# Patient Record
Sex: FEMALE | Race: WHITE | NOT HISPANIC OR LATINO | Employment: PART TIME | URBAN - METROPOLITAN AREA
[De-identification: names, ages, dates, MRNs, and addresses within clinical notes are randomized per-mention and may not be internally consistent; named-entity substitution may affect disease eponyms.]

---

## 2017-01-25 ENCOUNTER — OFFICE VISIT (OUTPATIENT)
Dept: OBSTETRICS AND GYNECOLOGY | Facility: CLINIC | Age: 52
End: 2017-01-25

## 2017-01-25 VITALS
BODY MASS INDEX: 24.29 KG/M2 | WEIGHT: 164 LBS | HEIGHT: 69 IN | DIASTOLIC BLOOD PRESSURE: 70 MMHG | SYSTOLIC BLOOD PRESSURE: 120 MMHG | RESPIRATION RATE: 14 BRPM

## 2017-01-25 DIAGNOSIS — Z01.419 WELL FEMALE EXAM WITH ROUTINE GYNECOLOGICAL EXAM: Primary | ICD-10-CM

## 2017-01-25 PROCEDURE — 99396 PREV VISIT EST AGE 40-64: CPT | Performed by: OBSTETRICS & GYNECOLOGY

## 2017-01-25 NOTE — PROGRESS NOTES
Chief Complaint: Annual exam    Carlota Brian is a 51 y.o.  postmenopausal presenting for annual exam.  She had an episode of postmenopausal bleeding 2 years ago-workup was negative has not had anything since.  No gynecologic complaints, no urinary or bowel symptoms.  Mammogram is been normal another one scheduled for next month.  Daughter Sara huynh at [facility], son can is 21-form of autism-doing well.      Pertinent items are noted in HPI.     There were no vitals taken for this visit.         Physical Exam    General well developed; well nourished  no acute distress   Skin No suspicious lesions seen   Thyroid normal to inspection and palpation   Lungs breathing is unlabored  clear to auscultation bilaterally   Cardiac regular rate and rhythm, S1, S2 normal, no murmur, click, rub or gallop   Breasts Examined in supine position  Symmetric without masses or skin dimpling  Nipples normal without inversion, lesions or discharge  There are no palpable axillary nodes   Abdomen soft, non-tender; no masses  no umbilical or inginual hernias are present  no hepato-splenomegaly   [facility]Npelvic Clinical staff was present for exam  External genitalia:  normal appearance of the external genitalia including Bartholin's and [place]'s glands.  :  urethral meatus normal; urethral hypermobility is absent.  Vaginal:  normal pink mucosa without prolapse or lesions.  Cervix:  normal appearance.  Pap smear obtained  Uterus:  normal size, shape and consistency.  Adnexa:  normal bimanual exam of the adnexa.  Rectal:  digital rectal exam not performed; anus visually normal appearing.       Assessment:   1. Normal postmenopausal exam-no particular symptoms  2. Fibrocystic breast stable mammograms    Plan:  1. Annual exams

## 2017-01-25 NOTE — MR AVS SNAPSHOT
"                        Carlota Brian   2017 2:00 PM   Office Visit    Dept Phone:  [phone]   Encounter #:  [id number]    Provider:  Lyle Larsen MD   Department:  [facility] [place]                Your Full Care Plan              Your Updated Medication List      Notice  As of 2017  2:49 PM    You have not been prescribed any medications.            You Were Diagnosed With        Codes Comments    Well female exam with routine gynecological exam    -  Primary ICD-10-CM: Z01.419  ICD-9-CM: V72.31       Instructions     None    Patient Instructions History      Upcoming Appointments     Visit Type Date Time Department    ANNUAL 2017  2:00 PM [facility]      MyChart Signup     [facility] Dtime allows you to send messages to your doctor, view your test results, renew your prescriptions, schedule appointments, and more. To sign up, go to [employer] and click on the Sign Up Now link in the New User? box. Enter your Dtime Activation Code exactly as it appears below along with the last four digits of your Social Security Number and your Date of Birth () to complete the sign-up process. If you do not sign up before the expiration date, you must request a new code.    Dtime Activation Code: [email or web address]  Expires: 2017  2:49 PM    If you have questions, you can email [email or web address] or call [phone] to talk to our Dtime staff. Remember, Dtime is NOT to be used for urgent needs. For medical emergencies, dial 911.               Other Info from Your Visit           Allergies     No Known Allergies      Reason for Visit     Gynecologic Exam annual      Vital Signs     Blood Pressure Respirations Height Weight Body Mass Index Smoking Status    120/70 14 69\" (175.3 cm) 164 lb (74.4 kg) 24.22 kg/m2 Never Smoker      Problems and Diagnoses Noted     Well female exam with routine gynecological exam    -  Primary "

## 2018-01-30 ENCOUNTER — OFFICE VISIT (OUTPATIENT)
Dept: OBSTETRICS AND GYNECOLOGY | Facility: CLINIC | Age: 53
End: 2018-01-30

## 2018-01-30 VITALS
WEIGHT: 172.6 LBS | BODY MASS INDEX: 25.56 KG/M2 | SYSTOLIC BLOOD PRESSURE: 126 MMHG | HEIGHT: 69 IN | DIASTOLIC BLOOD PRESSURE: 82 MMHG

## 2018-01-30 DIAGNOSIS — N60.11 FIBROCYSTIC BREAST CHANGES, BILATERAL: Primary | ICD-10-CM

## 2018-01-30 DIAGNOSIS — N60.12 FIBROCYSTIC BREAST CHANGES, BILATERAL: Primary | ICD-10-CM

## 2018-01-30 DIAGNOSIS — Z78.0 MENOPAUSE: ICD-10-CM

## 2018-01-30 DIAGNOSIS — Z01.419 ENCOUNTER FOR GYNECOLOGICAL EXAMINATION WITHOUT ABNORMAL FINDING: ICD-10-CM

## 2018-01-30 PROCEDURE — 99396 PREV VISIT EST AGE 40-64: CPT | Performed by: OBSTETRICS & GYNECOLOGY

## 2018-01-30 RX ORDER — LEVOTHYROXINE SODIUM 88 UG/1
1 TABLET ORAL DAILY
Refills: 3 | COMMUNITY
Start: 2017-12-04 | End: 2020-10-12 | Stop reason: SDUPTHER

## 2018-01-30 RX ORDER — CHLORAL HYDRATE 500 MG
1000 CAPSULE ORAL
COMMUNITY
End: 2020-02-13

## 2018-01-30 RX ORDER — SODIUM PHOSPHATE,MONO-DIBASIC 19G-7G/118
1 ENEMA (ML) RECTAL EVERY OTHER DAY
COMMUNITY
End: 2021-03-18

## 2018-01-30 NOTE — PROGRESS NOTES
Chief Complaint   Patient presents with   • Gynecologic Exam       Carlota Brian is a 52 y.o. year old  presenting to be seen for her annual exam.This patient has been followed by Dr. Lyle Larsen.  She is menopausal and does not use estrogen/progestin therapy.  She has some vaginal dryness.  She denies other menopausal symptoms.  She denies bowel or urinary symptoms.    SCREENING TESTS    Year 2012   Age                         PAP      ASCUS                   HPV high risk      Neg.                   Mammogram      benign                   SHELTON score                         Breast MRI                         Lipids                         Vitamin D                         Colonoscopy                         DEXA  Frax (hip/any)                         Ovarian Screen    normal                         She exercises regularly: yes.  She wears her seat belt: yes.  She has concerns about domestic violence: no.  She has noticed changes in height: no    GYN screening history:  · Last pap: was done on approximately 2017 and the result was: ASCUS with NEGATIVE high risk HPV.  · Last mammogram: was done on approximately 3/30/2017 and the result was: Birads II (Benign findings)..    No Additional Complaints Reported    The following portions of the patient's history were reviewed and updated as appropriate:vital signs and   She  does not have any pertinent problems on file.  She  has a past surgical history that includes Ear Tubes Removal (Bilateral).  Her family history is not on file.  She  reports that she has never smoked. She has never used smokeless tobacco. She reports that she drinks alcohol. She reports that she does not use illicit drugs.  Current Outpatient Prescriptions   Medication Sig Dispense Refill   • BIOTIN FORTE PO Take 1 tablet by mouth Daily.     • Calcium-Magnesium-Vitamin D (CALCIUM  "MAGNESIUM PO) Take 1 tablet by mouth Daily.     • glucosamine-chondroitin 500-400 MG capsule capsule Take 1 capsule by mouth 2 (Two) Times a Day With Meals.     • Multiple Vitamins-Minerals (MULTIVITAMIN ADULT PO) Take 1 tablet by mouth Daily.     • Omega-3 Fatty Acids (FISH OIL) 1000 MG capsule capsule Take 1,000 mg by mouth Daily With Breakfast.     • levocetirizine (XYZAL) 5 MG tablet Take 5 mg by mouth Every Evening.     • levothyroxine (SYNTHROID, LEVOTHROID) 88 MCG tablet Take 1 tablet by mouth Daily.  3     No current facility-administered medications for this visit.      She is allergic to azithromycin..    Review of Systems  A comprehensive review of systems was taken.  Constitutional: negative for fever, chills, activity change, appetite change, fatigue and unexpected weight change.  Respiratory: negative  Cardiovascular: negative  Gastrointestinal: negative  Genitourinary:negative  Musculoskeletal:negative  Behavioral/Psych: negative       /82  Ht 175.3 cm (69\")  Wt 78.3 kg (172 lb 9.6 oz)  BMI 25.49 kg/m2    Physical Exam    General:  alert; cooperative; well developed; well nourished   Skin:  No suspicious lesions seen   Thyroid: normal to inspection and palpation   Lungs:  clear to auscultation bilaterally   Heart:  regular rate and rhythm, S1, S2 normal, no murmur, click, rub or gallop   Breasts:  Examined in supine position  Symmetric without masses or skin dimpling  Nipples normal without inversion, lesions or discharge  There are no palpable axillary nodes  Fibrocystic changes are present both breasts without a discrete mass   Abdomen: soft, non-tender; no masses  no umbilical or inginual hernias are present  no hepato-splenomegaly   Pelvis: Clinical staff was present for exam  External genitalia:  normal appearance of the external genitalia including Bartholin's and [place]'s glands.  Vaginal:  normal pink mucosa without prolapse or lesions.  Cervix:  normal appearance.  Uterus:  normal " size, shape and consistency. anteverted;  Adnexa:  non palpable bilaterally.  Rectal:  anus visually normal appearing. recto-vaginal exam unremarkable and confirms findings;     Lab Review   No data reviewed    Imaging  Mammogram results- benign         ASSESSMENT  Problems Addressed this Visit        Genitourinary    Menopause    Relevant Orders    DEXA Bone Density Axial       Other    Fibrocystic breast changes, bilateral - Primary      Other Visit Diagnoses     Encounter for gynecological examination without abnormal finding        Relevant Orders    Liquid-based Pap Smear, Screening - ThinPrep Vial, Cervix, Endocervix          PLAN    Medications prescribed this encounter:    New Medications Ordered This Visit   Medications   • levothyroxine (SYNTHROID, LEVOTHROID) 88 MCG tablet     Sig: Take 1 tablet by mouth Daily.     Refill:  3   • glucosamine-chondroitin 500-400 MG capsule capsule     Sig: Take 1 capsule by mouth 2 (Two) Times a Day With Meals.   • Omega-3 Fatty Acids (FISH OIL) 1000 MG capsule capsule     Sig: Take 1,000 mg by mouth Daily With Breakfast.   • Multiple Vitamins-Minerals (MULTIVITAMIN ADULT PO)     Sig: Take 1 tablet by mouth Daily.   • BIOTIN FORTE PO     Sig: Take 1 tablet by mouth Daily.   • Calcium-Magnesium-Vitamin D (CALCIUM MAGNESIUM PO)     Sig: Take 1 tablet by mouth Daily.   · Pap test done  · DEXA ordered  · Calcium, 600 mg/ Vit. D, 400 IU daily; regular weight-bearing exercise  · Follow up: 12 month(s)  *Please note that portions of this documentation may have been completed with a voice recognition program.  Efforts were made to edit this dictation, but occasional words may have been mistranscribed.       This note was electronically signed.    PARVIN Antoine MD  January 30, 2018  1:34 PM

## 2018-02-08 ENCOUNTER — HOSPITAL ENCOUNTER (OUTPATIENT)
Dept: BONE DENSITY | Facility: HOSPITAL | Age: 53
Discharge: HOME | End: 2018-02-08
Attending: OBSTETRICS & GYNECOLOGY | Admitting: OBSTETRICS & GYNECOLOGY

## 2018-02-08 DIAGNOSIS — Z78.0 MENOPAUSE: ICD-10-CM

## 2018-02-08 PROCEDURE — 77080 DXA BONE DENSITY AXIAL: CPT

## 2018-02-12 ENCOUNTER — TELEPHONE (OUTPATIENT)
Dept: OBSTETRICS AND GYNECOLOGY | Facility: CLINIC | Age: 53
End: 2018-02-12

## 2018-02-12 NOTE — TELEPHONE ENCOUNTER
"Pt notified of DEXA result and Dr Antoine's suggestion that she take Alendronate 70 mg weekly . She says she \"wants to think about this, is not sure she wants to take bone building medication\". We discussed checking labs and she will go to [facility] lab tomorrow for Serum Calcium and Vit D. Says she gets her TSH checked q 6 months and is on Levothyroxine 88 mcg. She sees Dr Lion, Endocrinologist. Her lab results in EPIC indicatre she has not has thyroid levels checked since 1/2016. I called pt back to let her know - she will call Endocrinologist's office tomorrow AM and have them send lab request and will make an appt to follow up with them. We discussed Calcium and Vit D intake and importance of exercise. We will talk again after lab results.      "

## 2018-02-13 ENCOUNTER — LAB (OUTPATIENT)
Dept: LAB | Facility: HOSPITAL | Age: 53
End: 2018-02-13

## 2018-02-13 ENCOUNTER — TRANSCRIBE ORDERS (OUTPATIENT)
Dept: LAB | Facility: HOSPITAL | Age: 53
End: 2018-02-13

## 2018-02-13 DIAGNOSIS — I51.9 MYXEDEMA HEART DISEASE: Primary | ICD-10-CM

## 2018-02-13 DIAGNOSIS — M85.89 OSTEOPENIA OF MULTIPLE SITES: Primary | ICD-10-CM

## 2018-02-13 DIAGNOSIS — E03.9 MYXEDEMA HEART DISEASE: Primary | ICD-10-CM

## 2018-02-13 DIAGNOSIS — I51.9 MYXEDEMA HEART DISEASE: ICD-10-CM

## 2018-02-13 DIAGNOSIS — M85.89 OSTEOPENIA OF MULTIPLE SITES: ICD-10-CM

## 2018-02-13 DIAGNOSIS — E03.9 MYXEDEMA HEART DISEASE: ICD-10-CM

## 2018-02-13 LAB
25(OH)D3 SERPL-MCNC: 25 NG/ML
ALBUMIN SERPL-MCNC: 4.5 G/DL (ref 3.2–4.8)
ALBUMIN/GLOB SERPL: 2 G/DL (ref 1.5–2.5)
ALP SERPL-CCNC: 92 U/L (ref 25–100)
ALT SERPL W P-5'-P-CCNC: 38 U/L (ref 7–40)
ANION GAP SERPL CALCULATED.3IONS-SCNC: 6 MMOL/L (ref 3–11)
AST SERPL-CCNC: 22 U/L (ref 0–33)
BILIRUB SERPL-MCNC: 0.4 MG/DL (ref 0.3–1.2)
BUN BLD-MCNC: 19 MG/DL (ref 9–23)
BUN/CREAT SERPL: 23.8 (ref 7–25)
CALCIUM SPEC-SCNC: 9.2 MG/DL (ref 8.7–10.4)
CHLORIDE SERPL-SCNC: 102 MMOL/L (ref 99–109)
CO2 SERPL-SCNC: 30 MMOL/L (ref 20–31)
CREAT BLD-MCNC: 0.8 MG/DL (ref 0.6–1.3)
GFR SERPL CREATININE-BSD FRML MDRD: 75 ML/MIN/1.73
GLOBULIN UR ELPH-MCNC: 2.3 GM/DL
GLUCOSE BLD-MCNC: 85 MG/DL (ref 70–100)
POTASSIUM BLD-SCNC: 4.2 MMOL/L (ref 3.5–5.5)
PROT SERPL-MCNC: 6.8 G/DL (ref 5.7–8.2)
SODIUM BLD-SCNC: 138 MMOL/L (ref 132–146)
T3RU NFR SERPL: 31.5 % (ref 23–37)
T4 FREE SERPL-MCNC: 1.43 NG/DL (ref 0.89–1.76)
T4 SERPL-MCNC: 10 MCG/DL (ref 4.7–11.4)
TSH SERPL DL<=0.05 MIU/L-ACNC: 0.67 MIU/ML (ref 0.35–5.35)

## 2018-02-13 PROCEDURE — 36415 COLL VENOUS BLD VENIPUNCTURE: CPT

## 2018-02-13 PROCEDURE — 82306 VITAMIN D 25 HYDROXY: CPT

## 2018-02-13 PROCEDURE — 80053 COMPREHEN METABOLIC PANEL: CPT

## 2018-02-13 PROCEDURE — 84479 ASSAY OF THYROID (T3 OR T4): CPT

## 2018-02-13 PROCEDURE — 84439 ASSAY OF FREE THYROXINE: CPT

## 2018-02-13 PROCEDURE — 84436 ASSAY OF TOTAL THYROXINE: CPT

## 2018-02-13 PROCEDURE — 84443 ASSAY THYROID STIM HORMONE: CPT

## 2018-02-14 ENCOUNTER — TELEPHONE (OUTPATIENT)
Dept: OBSTETRICS AND GYNECOLOGY | Facility: CLINIC | Age: 53
End: 2018-02-14

## 2018-02-14 NOTE — TELEPHONE ENCOUNTER
"Pt returning call for lab results. Instructions given to begin taking Vit D3 2000 IU daily as her level is low. Pt says she has begun walking at least 35 - 40 minutes at least 4 x a week and \"including lots of hills\". She states she has decided she does not want to take alendronate at this time.      "

## 2018-05-10 ENCOUNTER — OFFICE VISIT (OUTPATIENT)
Dept: INTERNAL MEDICINE | Facility: CLINIC | Age: 53
End: 2018-05-10

## 2018-05-10 VITALS
OXYGEN SATURATION: 98 % | HEIGHT: 69 IN | DIASTOLIC BLOOD PRESSURE: 78 MMHG | BODY MASS INDEX: 24.68 KG/M2 | WEIGHT: 166.6 LBS | HEART RATE: 86 BPM | TEMPERATURE: 98.2 F | SYSTOLIC BLOOD PRESSURE: 126 MMHG

## 2018-05-10 DIAGNOSIS — E55.9 VITAMIN D DEFICIENCY: ICD-10-CM

## 2018-05-10 DIAGNOSIS — Z82.49 FAMILY HISTORY OF AORTIC ANEURYSM: ICD-10-CM

## 2018-05-10 DIAGNOSIS — Z13.220 SCREENING FOR LIPID DISORDERS: ICD-10-CM

## 2018-05-10 DIAGNOSIS — Z00.00 ANNUAL PHYSICAL EXAM: Primary | ICD-10-CM

## 2018-05-10 DIAGNOSIS — Z23 NEED FOR VACCINATION: ICD-10-CM

## 2018-05-10 PROBLEM — M85.89 OSTEOPENIA OF MULTIPLE SITES: Status: ACTIVE | Noted: 2018-05-10

## 2018-05-10 LAB
25(OH)D3 SERPL-MCNC: 36.2 NG/ML
ARTICHOKE IGE QN: 130 MG/DL (ref 0–130)
BASOPHILS # BLD AUTO: 0.02 10*3/MM3 (ref 0–0.2)
BASOPHILS NFR BLD AUTO: 0.2 % (ref 0–1)
BILIRUB BLD-MCNC: NEGATIVE MG/DL
CHOLEST SERPL-MCNC: 226 MG/DL (ref 0–200)
CLARITY, POC: CLEAR
COLOR UR: YELLOW
DEPRECATED RDW RBC AUTO: 42 FL (ref 37–54)
DEVELOPER EXPIRATION DATE: NORMAL
DEVELOPER LOT NUMBER: NORMAL
EOSINOPHIL # BLD AUTO: 0.25 10*3/MM3 (ref 0–0.3)
EOSINOPHIL NFR BLD AUTO: 2.9 % (ref 0–3)
ERYTHROCYTE [DISTWIDTH] IN BLOOD BY AUTOMATED COUNT: 13 % (ref 11.3–14.5)
EXPIRATION DATE: NORMAL
FECAL OCCULT BLOOD SCREEN, POC: NEGATIVE
GLUCOSE UR STRIP-MCNC: NEGATIVE MG/DL
HCT VFR BLD AUTO: 42.3 % (ref 34.5–44)
HDLC SERPL-MCNC: 75 MG/DL (ref 40–60)
HGB BLD-MCNC: 14 G/DL (ref 11.5–15.5)
IMM GRANULOCYTES # BLD: 0.02 10*3/MM3 (ref 0–0.03)
IMM GRANULOCYTES NFR BLD: 0.2 % (ref 0–0.6)
KETONES UR QL: NEGATIVE
LEUKOCYTE EST, POC: NEGATIVE
LYMPHOCYTES # BLD AUTO: 3.65 10*3/MM3 (ref 0.6–4.8)
LYMPHOCYTES NFR BLD AUTO: 42.8 % (ref 24–44)
Lab: NORMAL
MCH RBC QN AUTO: 29.4 PG (ref 27–31)
MCHC RBC AUTO-ENTMCNC: 33.1 G/DL (ref 32–36)
MCV RBC AUTO: 88.9 FL (ref 80–99)
MONOCYTES # BLD AUTO: 0.53 10*3/MM3 (ref 0–1)
MONOCYTES NFR BLD AUTO: 6.2 % (ref 0–12)
NEGATIVE CONTROL: NEGATIVE
NEUTROPHILS # BLD AUTO: 4.08 10*3/MM3 (ref 1.5–8.3)
NEUTROPHILS NFR BLD AUTO: 47.9 % (ref 41–71)
NITRITE UR-MCNC: NEGATIVE MG/ML
PH UR: 6 [PH] (ref 5–8)
PLATELET # BLD AUTO: 299 10*3/MM3 (ref 150–450)
PMV BLD AUTO: 11.7 FL (ref 6–12)
POSITIVE CONTROL: POSITIVE
PROT UR STRIP-MCNC: NEGATIVE MG/DL
RBC # BLD AUTO: 4.76 10*6/MM3 (ref 3.89–5.14)
RBC # UR STRIP: NEGATIVE /UL
SP GR UR: 1.03 (ref 1–1.03)
TRIGL SERPL-MCNC: 78 MG/DL (ref 0–150)
UROBILINOGEN UR QL: NORMAL
WBC NRBC COR # BLD: 8.53 10*3/MM3 (ref 3.5–10.8)

## 2018-05-10 PROCEDURE — 85025 COMPLETE CBC W/AUTO DIFF WBC: CPT | Performed by: FAMILY MEDICINE

## 2018-05-10 PROCEDURE — 90715 TDAP VACCINE 7 YRS/> IM: CPT | Performed by: FAMILY MEDICINE

## 2018-05-10 PROCEDURE — 90471 IMMUNIZATION ADMIN: CPT | Performed by: FAMILY MEDICINE

## 2018-05-10 PROCEDURE — 81003 URINALYSIS AUTO W/O SCOPE: CPT | Performed by: FAMILY MEDICINE

## 2018-05-10 PROCEDURE — 80061 LIPID PANEL: CPT | Performed by: FAMILY MEDICINE

## 2018-05-10 PROCEDURE — 99396 PREV VISIT EST AGE 40-64: CPT | Performed by: FAMILY MEDICINE

## 2018-05-10 PROCEDURE — 82270 OCCULT BLOOD FECES: CPT | Performed by: FAMILY MEDICINE

## 2018-05-10 PROCEDURE — 82306 VITAMIN D 25 HYDROXY: CPT | Performed by: FAMILY MEDICINE

## 2018-05-10 RX ORDER — ACETAMINOPHEN 160 MG
2000 TABLET,DISINTEGRATING ORAL DAILY
COMMUNITY

## 2018-05-10 NOTE — PROGRESS NOTES
"Subjective   Carlota Brian is a 52 y.o. female.     Chief Complaint   Patient presents with   • Annual Exam     sees GYN   • Flu Vaccine     discuss tetanus       Visit Vitals  /78   Pulse 86   Temp 98.2 °F (36.8 °C)   Ht 175.3 cm (69\")   Wt 75.6 kg (166 lb 9.6 oz)   SpO2 98%   BMI 24.60 kg/m²         History of Present Illness   Pt had pap in January.  Pt has family hx of ascending aortic aneurysm in father and paternal uncles and possibly paternal GF who all  in their 50's and in one P aunt.       The following portions of the patient's history were reviewed and updated as appropriate: allergies, current medications, past family history, past medical history, past social history, past surgical history and problem list.    Past Medical History:   Diagnosis Date   • Family history of aortic aneurysm     Dad and P uncles and P aunt:  thoracic ascending   • Hypothyroidism    • Seasonal allergies    • Vitamin D deficiency 5/10/2018      Past Surgical History:   Procedure Laterality Date   • COLONOSCOPY      age 48 in [place]   • EAR TUBES Bilateral       Family History   Problem Relation Age of Onset   • Thyroid disease Mother    • Coronary artery disease Mother    • Hypertension Mother    • Hyperlipidemia Mother    • Scleroderma Mother    • Dumont's esophagus Mother    • Peripheral vascular disease Mother    • Hepatitis Mother      ? hep C from transfusion   • Fibromyalgia Mother    • Aneurysm Father    • Thyroid disease Sister    • Autism Son    • Seizures Sister    • Heart attack Paternal Aunt    • Aneurysm Paternal Aunt    • Heart attack Paternal Uncle    • No Known Problems Paternal Grandmother    • Aneurysm Paternal Uncle    • Heart attack Paternal Uncle    • Dementia Paternal Aunt    • Lung cancer Paternal Aunt    • Heart attack Paternal Grandfather       Social History     Social History   • Marital status:      Spouse name: N/A   • Number of children: N/A   • Years of education: N/A "     Occupational History   • Not on file.     Social History Main Topics   • Smoking status: Never Smoker   • Smokeless tobacco: Never Used   • Alcohol use Yes      Comment: occ   • Drug use: No   • Sexual activity: Yes     Partners: Male     Birth control/ protection: Post-menopausal      Comment:      Other Topics Concern   • Not on file     Social History Narrative   • No narrative on file        Review of Systems   Constitutional: Negative.  Negative for activity change, appetite change, chills, diaphoresis, fatigue, fever and unexpected weight change.   HENT: Negative.  Negative for congestion, dental problem, drooling, ear discharge, ear pain, facial swelling, hearing loss, mouth sores, nosebleeds, postnasal drip, rhinorrhea, sinus pain, sinus pressure, sneezing, sore throat, tinnitus, trouble swallowing and voice change.    Eyes: Negative.  Negative for photophobia, pain, discharge, redness, itching and visual disturbance.   Respiratory: Negative.  Negative for apnea, cough, choking, chest tightness, shortness of breath, wheezing and stridor.    Cardiovascular: Negative.  Negative for chest pain, palpitations and leg swelling.   Gastrointestinal: Negative.  Negative for abdominal distention, abdominal pain, anal bleeding, blood in stool, constipation, diarrhea, nausea, rectal pain and vomiting.   Endocrine: Negative.  Negative for cold intolerance, heat intolerance, polydipsia, polyphagia and polyuria.   Genitourinary: Positive for difficulty urinating (woke up 3 x with urgency and them problem with urinating). Negative for decreased urine volume, dyspareunia, dysuria, enuresis, flank pain, frequency, genital sores, hematuria, menstrual problem, pelvic pain, urgency, vaginal bleeding, vaginal discharge and vaginal pain.   Musculoskeletal: Negative.  Negative for arthralgias, back pain, gait problem, joint swelling, myalgias, neck pain and neck stiffness.   Skin: Negative.  Negative for color change,  pallor, rash and wound.   Allergic/Immunologic: Negative.  Negative for environmental allergies, food allergies and immunocompromised state.   Neurological: Negative.  Negative for dizziness, tremors, seizures, syncope, facial asymmetry, speech difficulty, weakness, light-headedness, numbness and headaches.   Hematological: Negative.  Negative for adenopathy. Does not bruise/bleed easily.   Psychiatric/Behavioral: Negative.  Negative for agitation, behavioral problems, confusion, decreased concentration, dysphoric mood, hallucinations, self-injury, sleep disturbance and suicidal ideas. The patient is not nervous/anxious and is not hyperactive.        Objective   Physical Exam   Constitutional: She is oriented to person, place, and time. She appears well-developed and well-nourished. No distress.   HENT:   Head: Normocephalic and atraumatic.   Right Ear: Tympanic membrane, external ear and ear canal normal.   Left Ear: Tympanic membrane, external ear and ear canal normal.   Nose: Nose normal.   Mouth/Throat: Uvula is midline, oropharynx is clear and moist and mucous membranes are normal. Mucous membranes are not pale, not dry and not cyanotic. Normal dentition. No oropharyngeal exudate, posterior oropharyngeal edema or posterior oropharyngeal erythema.   Eyes: Conjunctivae, EOM and lids are normal. Pupils are equal, round, and reactive to light. Right eye exhibits no chemosis, no discharge, no exudate and no hordeolum. No foreign body present in the right eye. Left eye exhibits no chemosis, no discharge, no exudate and no hordeolum. No foreign body present in the left eye. Right conjunctiva is not injected. Right conjunctiva has no hemorrhage. Left conjunctiva is not injected. Left conjunctiva has no hemorrhage. No scleral icterus. Right eye exhibits normal extraocular motion and no nystagmus. Left eye exhibits normal extraocular motion and no nystagmus.   Fundoscopic exam:       The right eye shows no AV nicking, no  exudate, no hemorrhage and no papilledema. The right eye shows red reflex.        The left eye shows no AV nicking, no exudate, no hemorrhage and no papilledema. The left eye shows red reflex.   Neck: Trachea normal and normal range of motion. Neck supple. Carotid bruit is not present. No tracheal deviation present. No thyroid mass and no thyromegaly present.   Cardiovascular: Normal rate, regular rhythm, normal heart sounds and intact distal pulses.  Exam reveals no gallop and no friction rub.    No murmur heard.  Pulses:       Dorsalis pedis pulses are 2+ on the right side, and 2+ on the left side.        Posterior tibial pulses are 2+ on the right side, and 2+ on the left side.   Pulmonary/Chest: Effort normal and breath sounds normal. No respiratory distress. She has no decreased breath sounds. She has no wheezes. She has no rhonchi. She has no rales. Chest wall is not dull to percussion. She exhibits no tenderness. Right breast exhibits no inverted nipple, no mass, no nipple discharge, no skin change and no tenderness. Left breast exhibits no inverted nipple, no mass, no nipple discharge, no skin change and no tenderness.   Abdominal: Soft. Bowel sounds are normal. She exhibits no distension and no mass. There is no hepatosplenomegaly. There is no tenderness. There is no rebound and no guarding. No hernia.   Genitourinary: Rectum normal. Rectal exam shows no external hemorrhoid, no internal hemorrhoid, no fissure, no mass, no tenderness, anal tone normal and guaiac negative stool.   Musculoskeletal: Normal range of motion. She exhibits no edema, tenderness or deformity.     Vascular Status -  Her right foot exhibits normal foot vasculature  and no edema. Her left foot exhibits normal foot vasculature  and no edema.  Skin Integrity  -  Her right foot skin is intact.Her left foot skin is intact..  Lymphadenopathy:        Head (right side): No submandibular adenopathy present.        Head (left side): No  submandibular adenopathy present.     She has no cervical adenopathy.        Right cervical: No superficial cervical, no deep cervical and no posterior cervical adenopathy present.       Left cervical: No superficial cervical, no deep cervical and no posterior cervical adenopathy present.     She has no axillary adenopathy.        Right axillary: No pectoral and no lateral adenopathy present.        Left axillary: No pectoral and no lateral adenopathy present.       Right: No inguinal and no supraclavicular adenopathy present.        Left: No inguinal and no supraclavicular adenopathy present.   Neurological: She is alert and oriented to person, place, and time. She has normal strength and normal reflexes. No cranial nerve deficit or sensory deficit. Coordination normal.   Reflex Scores:       Bicep reflexes are 2+ on the right side and 2+ on the left side.       Brachioradialis reflexes are 2+ on the right side and 2+ on the left side.       Patellar reflexes are 2+ on the right side and 2+ on the left side.  Skin: Skin is warm and dry. No rash noted. She is not diaphoretic. No cyanosis. Nails show no clubbing.   Psychiatric: She has a normal mood and affect. Her speech is normal and behavior is normal. Judgment and thought content normal. Cognition and memory are normal.   Nursing note and vitals reviewed.        Assessment/Plan   Carlota was seen today for annual exam and flu vaccine.    Diagnoses and all orders for this visit:    Annual physical exam  -     POCT urinalysis dipstick, automated  -     CBC & Differential  -     POC Occult Blood Stool  -     CBC Auto Differential    Family history of aortic aneurysm  -     Adult Transthoracic Echo Limited W/ Cont if Necessary Per Protocol; Future    Vitamin D deficiency  -     Vitamin D 25 Hydroxy    Screening for lipid disorders  -     Lipid Panel    Need for vaccination  -     Tdap Vaccine Greater Than or Equal To 8yo IM      Please follow a low animal fat diet that  is also low in sugar, low in junk food, low in sweet drinks and low in alcohol.  Please increase the amount of fiber in your diet as well as increasing your daily exercise, such as walking.  Pt has seen Dr Lion for thyroid lab.     Discussed possible genetic testing/counseling because of family hx thoracic aortic aneurysm.          Current Outpatient Prescriptions:   •  albuterol (PROVENTIL HFA;VENTOLIN HFA) 108 (90 Base) MCG/ACT inhaler, Inhale 2 puffs Every 4 (Four) Hours As Needed for Wheezing., Disp: 1 inhaler, Rfl: 0  •  BIOTIN FORTE PO, Take 1 tablet by mouth Daily., Disp: , Rfl:   •  Calcium-Magnesium-Vitamin D (CALCIUM MAGNESIUM PO), Take 1 tablet by mouth Daily., Disp: , Rfl:   •  Cholecalciferol (VITAMIN D3) 2000 units capsule, Take 2,000 Units by mouth Daily., Disp: , Rfl:   •  glucosamine-chondroitin 500-400 MG capsule capsule, Take 1 capsule by mouth 2 (Two) Times a Day With Meals., Disp: , Rfl:   •  levocetirizine (XYZAL) 5 MG tablet, Take 5 mg by mouth Every Evening., Disp: , Rfl:   •  levothyroxine (SYNTHROID, LEVOTHROID) 88 MCG tablet, Take 1 tablet by mouth Daily., Disp: , Rfl: 3  •  Multiple Vitamins-Minerals (MULTIVITAMIN ADULT PO), Take 1 tablet by mouth Daily., Disp: , Rfl:   •  Omega-3 Fatty Acids (FISH OIL) 1000 MG capsule capsule, Take 1,000 mg by mouth Daily With Breakfast., Disp: , Rfl:     Return in about 6 months (around 11/10/2018), or if symptoms worsen or fail to improve, for Recheck.    Recent Results (from the past 2688 hour(s))   Vitamin D 25 Hydroxy    Collection Time: 02/13/18  1:17 PM   Result Value Ref Range    25 Hydroxy, Vitamin D 25.0 ng/ml   Comprehensive Metabolic Panel    Collection Time: 02/13/18  1:17 PM   Result Value Ref Range    Glucose 85 70 - 100 mg/dL    BUN 19 9 - 23 mg/dL    Creatinine 0.80 0.60 - 1.30 mg/dL    Sodium 138 132 - 146 mmol/L    Potassium 4.2 3.5 - 5.5 mmol/L    Chloride 102 99 - 109 mmol/L    CO2 30.0 20.0 - 31.0 mmol/L    Calcium 9.2 8.7 - 10.4  mg/dL    Total Protein 6.8 5.7 - 8.2 g/dL    Albumin 4.50 3.20 - 4.80 g/dL    ALT (SGPT) 38 7 - 40 U/L    AST (SGOT) 22 0 - 33 U/L    Alkaline Phosphatase 92 25 - 100 U/L    Total Bilirubin 0.4 0.3 - 1.2 mg/dL    eGFR Non African Amer 75 >60 mL/min/1.73    Globulin 2.3 gm/dL    A/G Ratio 2.0 1.5 - 2.5 g/dL    BUN/Creatinine Ratio 23.8 7.0 - 25.0    Anion Gap 6.0 3.0 - 11.0 mmol/L   T3, Uptake    Collection Time: 02/13/18  1:17 PM   Result Value Ref Range    T3 Uptake 31.5 23.0 - 37.0 %   T4    Collection Time: 02/13/18  1:17 PM   Result Value Ref Range    T4, Total 10.00 4.70 - 11.40 mcg/dL   T4, Free    Collection Time: 02/13/18  1:17 PM   Result Value Ref Range    Free T4 1.43 0.89 - 1.76 ng/dL   TSH    Collection Time: 02/13/18  1:17 PM   Result Value Ref Range    TSH 0.666 0.350 - 5.350 mIU/mL   POCT Rapid Strep A    Collection Time: 04/10/18 10:12 AM   Result Value Ref Range    Rapid Strep A Screen Negative Negative, VALID, INVALID, Not Performed    Internal Control Passed Passed    Lot Number [id number]     Expiration Date 12-    POCT urinalysis dipstick, automated    Collection Time: 05/10/18  2:32 PM   Result Value Ref Range    Color Yellow Yellow, Straw, Dark Yellow, Niyah    Clarity, UA Clear Clear    Glucose, UA Negative Negative, 1000 mg/dL (3+) mg/dL    Bilirubin Negative Negative    Ketones, UA Negative Negative    Specific Gravity  1.030 1.005 - 1.030    Blood, UA Negative Negative    pH, Urine 6.0 5.0 - 8.0    Protein, POC Negative Negative mg/dL    Urobilinogen, UA Normal Normal    Leukocytes Negative Negative    Nitrite, UA Negative Negative   POC Occult Blood Stool    Collection Time: 05/10/18  6:10 PM   Result Value Ref Range    Fecal Occult Blood Negative Negative    Lot Number [id number]     Expiration Date 11/30/2019     DEVELOPER LOT NUMBER [id number]     DEVELOPER EXPIRATION DATE 6/30/2020     Positive Control Positive Positive    Negative Control Negative Negative

## 2018-06-05 ENCOUNTER — APPOINTMENT (OUTPATIENT)
Dept: CARDIOLOGY | Facility: HOSPITAL | Age: 53
End: 2018-06-05

## 2018-10-18 ENCOUNTER — HOSPITAL ENCOUNTER (OUTPATIENT)
Dept: CARDIOLOGY | Facility: HOSPITAL | Age: 53
Discharge: HOME | End: 2018-10-18
Admitting: FAMILY MEDICINE

## 2018-10-18 VITALS — HEIGHT: 69 IN | WEIGHT: 170 LBS | BODY MASS INDEX: 25.18 KG/M2

## 2018-10-18 DIAGNOSIS — Z82.49 FAMILY HISTORY OF AORTIC ANEURYSM: ICD-10-CM

## 2018-10-18 DIAGNOSIS — R93.1 ABNORMAL ECHOCARDIOGRAM: Primary | ICD-10-CM

## 2018-10-18 LAB
BH CV ECHO MEAS - AO ROOT AREA (BSA CORRECTED): 1.4
BH CV ECHO MEAS - AO ROOT AREA: 5.7 CM^2
BH CV ECHO MEAS - AO ROOT DIAM: 2.7 CM
BH CV ECHO MEAS - ASC AORTA: 3.1 CM
BH CV ECHO MEAS - BSA(HAYCOCK): 1.9 M^2
BH CV ECHO MEAS - BSA: 1.9 M^2
BH CV ECHO MEAS - BZI_BMI: 25.8 KILOGRAMS/M^2
BH CV ECHO MEAS - BZI_METRIC_HEIGHT: 172.7 CM
BH CV ECHO MEAS - BZI_METRIC_WEIGHT: 77.1 KG
BH CV ECHO MEAS - EDV(CUBED): 65.5 ML
BH CV ECHO MEAS - EDV(MOD-SP2): 51 ML
BH CV ECHO MEAS - EDV(MOD-SP4): 35 ML
BH CV ECHO MEAS - EDV(TEICH): 71.3 ML
BH CV ECHO MEAS - EF(CUBED): 75.3 %
BH CV ECHO MEAS - EF(MOD-SP2): 64.7 %
BH CV ECHO MEAS - EF(MOD-SP4): 60 %
BH CV ECHO MEAS - EF(TEICH): 67.7 %
BH CV ECHO MEAS - ESV(CUBED): 16.2 ML
BH CV ECHO MEAS - ESV(MOD-SP2): 18 ML
BH CV ECHO MEAS - ESV(MOD-SP4): 14 ML
BH CV ECHO MEAS - ESV(TEICH): 23 ML
BH CV ECHO MEAS - FS: 37.2 %
BH CV ECHO MEAS - IVS/LVPW: 1.3
BH CV ECHO MEAS - IVSD: 1.1 CM
BH CV ECHO MEAS - LA DIMENSION: 2.5 CM
BH CV ECHO MEAS - LA/AO: 0.93
BH CV ECHO MEAS - LAD MAJOR: 4.4 CM
BH CV ECHO MEAS - LAT PEAK E' VEL: 12.6 CM/SEC
BH CV ECHO MEAS - LATERAL E/E' RATIO: 5.8
BH CV ECHO MEAS - LV DIASTOLIC VOL/BSA (35-75): 18.3 ML/M^2
BH CV ECHO MEAS - LV MASS(C)D: 123.5 GRAMS
BH CV ECHO MEAS - LV MASS(C)DI: 64.8 GRAMS/M^2
BH CV ECHO MEAS - LV SYSTOLIC VOL/BSA (12-30): 7.3 ML/M^2
BH CV ECHO MEAS - LVIDD: 4 CM
BH CV ECHO MEAS - LVIDS: 2.5 CM
BH CV ECHO MEAS - LVLD AP2: 6.9 CM
BH CV ECHO MEAS - LVLD AP4: 6.8 CM
BH CV ECHO MEAS - LVLS AP2: 6.2 CM
BH CV ECHO MEAS - LVLS AP4: 6 CM
BH CV ECHO MEAS - LVPWD: 0.85 CM
BH CV ECHO MEAS - MED PEAK E' VEL: 6.6 CM/SEC
BH CV ECHO MEAS - MEDIAL E/E' RATIO: 11
BH CV ECHO MEAS - MV A MAX VEL: 74 CM/SEC
BH CV ECHO MEAS - MV DEC SLOPE: 273 CM/SEC^2
BH CV ECHO MEAS - MV DEC TIME: 0.24 SEC
BH CV ECHO MEAS - MV E MAX VEL: 72.6 CM/SEC
BH CV ECHO MEAS - MV E/A: 0.98
BH CV ECHO MEAS - PA ACC SLOPE: 726.5 CM/SEC^2
BH CV ECHO MEAS - PA ACC TIME: 0.11 SEC
BH CV ECHO MEAS - PA PR(ACCEL): 30 MMHG
BH CV ECHO MEAS - RAP SYSTOLE: 5 MMHG
BH CV ECHO MEAS - RVDD: 2.3 CM
BH CV ECHO MEAS - RVSP: 21 MMHG
BH CV ECHO MEAS - SI(CUBED): 25.8 ML/M^2
BH CV ECHO MEAS - SI(MOD-SP2): 17.3 ML/M^2
BH CV ECHO MEAS - SI(MOD-SP4): 11 ML/M^2
BH CV ECHO MEAS - SI(TEICH): 25.3 ML/M^2
BH CV ECHO MEAS - SV(CUBED): 49.3 ML
BH CV ECHO MEAS - SV(MOD-SP2): 33 ML
BH CV ECHO MEAS - SV(MOD-SP4): 21 ML
BH CV ECHO MEAS - SV(TEICH): 48.3 ML
BH CV ECHO MEAS - TAPSE (>1.6): 1.9 CM2
BH CV ECHO MEAS - TR MAX PG: 16 MMHG
BH CV ECHO MEAS - TR MAX VEL: 202.3 CM/SEC
BH CV ECHO MEASUREMENTS AVERAGE E/E' RATIO: 7.56
BH CV VAS BP RIGHT ARM: NORMAL MMHG
BH CV XLRA - RV BASE: 2.5 CM
BH CV XLRA - RV LENGTH: 5.1 CM
BH CV XLRA - RV MID: 2.1 CM
BH CV XLRA - TDI S': 14.5 CM/SEC
LEFT ATRIUM VOLUME INDEX: 15.2 ML/M^2
LV EF 2D ECHO EST: 65 %
MAXIMAL PREDICTED HEART RATE: 165 BPM
STRESS TARGET HR: 140 BPM

## 2018-10-18 PROCEDURE — 93306 TTE W/DOPPLER COMPLETE: CPT | Performed by: INTERNAL MEDICINE

## 2018-10-18 PROCEDURE — 93306 TTE W/DOPPLER COMPLETE: CPT

## 2018-10-19 ENCOUNTER — TELEPHONE (OUTPATIENT)
Dept: INTERNAL MEDICINE | Facility: CLINIC | Age: 53
End: 2018-10-19

## 2018-10-19 NOTE — TELEPHONE ENCOUNTER
----- Message from Anh GAMINO MD sent at 10/18/2018  6:05 PM EDT -----  Please call the patient regarding her abnormal result. Pt needs a bubble study to check for possible patent foramen ovale as a color jet was seen

## 2018-10-30 ENCOUNTER — OFFICE VISIT (OUTPATIENT)
Dept: INTERNAL MEDICINE | Facility: CLINIC | Age: 53
End: 2018-10-30

## 2018-10-30 VITALS
BODY MASS INDEX: 25.45 KG/M2 | WEIGHT: 171.8 LBS | OXYGEN SATURATION: 100 % | SYSTOLIC BLOOD PRESSURE: 126 MMHG | DIASTOLIC BLOOD PRESSURE: 84 MMHG | TEMPERATURE: 97.9 F | HEIGHT: 69 IN | HEART RATE: 58 BPM

## 2018-10-30 DIAGNOSIS — R35.1 NOCTURIA: ICD-10-CM

## 2018-10-30 DIAGNOSIS — M25.552 HIP PAIN, LEFT: Primary | ICD-10-CM

## 2018-10-30 LAB
BILIRUB BLD-MCNC: NEGATIVE MG/DL
CLARITY, POC: CLEAR
COLOR UR: YELLOW
GLUCOSE UR STRIP-MCNC: NEGATIVE MG/DL
KETONES UR QL: NEGATIVE
LEUKOCYTE EST, POC: NEGATIVE
NITRITE UR-MCNC: NEGATIVE MG/ML
PH UR: 7 [PH] (ref 5–8)
PROT UR STRIP-MCNC: NEGATIVE MG/DL
RBC # UR STRIP: NEGATIVE /UL
SP GR UR: 1.01 (ref 1–1.03)
UROBILINOGEN UR QL: NORMAL

## 2018-10-30 PROCEDURE — 99213 OFFICE O/P EST LOW 20 MIN: CPT | Performed by: FAMILY MEDICINE

## 2018-10-30 PROCEDURE — 81003 URINALYSIS AUTO W/O SCOPE: CPT | Performed by: FAMILY MEDICINE

## 2018-10-30 NOTE — PROGRESS NOTES
"Subjective   Carlota Brian is a 53 y.o. female.     Chief Complaint   Patient presents with   • echo results     discuss   • Hip Pain     left, she wants to do PT       Visit Vitals  /84 (BP Location: Left arm)   Pulse 58   Temp 97.9 °F (36.6 °C) (Temporal Artery )   Ht 175.3 cm (69\")   Wt 77.9 kg (171 lb 12.8 oz)   SpO2 100%   BMI 25.37 kg/m²         History of Present Illness   Pt having left hip pain that is intermittent and pt attributes this to getting out of car.   Pt has changed how she gets out of car, which is helping.  Pt has seen chiropractor for back monthly.  Pt would like to go to PT    Dicussed echo results, possible PFO-asymptomatic  The following portions of the patient's history were reviewed and updated as appropriate: allergies, current medications, past family history, past medical history, past social history, past surgical history and problem list.    Past Medical History:   Diagnosis Date   • Family history of aortic aneurysm     Dad and P uncles and P aunt:  thoracic ascending   • Hypothyroidism    • Seasonal allergies    • Vitamin D deficiency 5/10/2018      Past Surgical History:   Procedure Laterality Date   • COLONOSCOPY      age 48 in [place]   • EAR TUBES Bilateral       Family History   Problem Relation Age of Onset   • Thyroid disease Mother    • Coronary artery disease Mother    • Hypertension Mother    • Hyperlipidemia Mother    • Scleroderma Mother    • Dumont's esophagus Mother    • Peripheral vascular disease Mother    • Hepatitis Mother         ? hep C from transfusion   • Fibromyalgia Mother    • Aneurysm Father    • Thyroid disease Sister    • Autism Son    • Seizures Sister    • Heart attack Paternal Aunt    • Aneurysm Paternal Aunt    • Heart attack Paternal Uncle    • No Known Problems Paternal Grandmother    • Aneurysm Paternal Uncle    • Heart attack Paternal Uncle    • Dementia Paternal Aunt    • Lung cancer Paternal Aunt    • Heart attack Paternal Grandfather "       Social History     Social History   • Marital status:      Spouse name: N/A   • Number of children: N/A   • Years of education: N/A     Occupational History   • Not on file.     Social History Main Topics   • Smoking status: Never Smoker   • Smokeless tobacco: Never Used   • Alcohol use Yes      Comment: occ   • Drug use: No   • Sexual activity: Yes     Partners: Male     Birth control/ protection: Post-menopausal      Comment:      Other Topics Concern   • Not on file     Social History Narrative   • No narrative on file        Review of Systems   Constitutional: Negative.  Negative for chills, diaphoresis, fatigue and fever.   HENT: Negative.  Negative for ear pain, nosebleeds, postnasal drip, rhinorrhea, sinus pressure, sneezing and sore throat.    Eyes: Negative.  Negative for redness and itching.   Respiratory: Negative.  Negative for cough, shortness of breath and wheezing.    Cardiovascular: Negative.  Negative for chest pain and palpitations.   Gastrointestinal: Negative.  Negative for abdominal pain, constipation, diarrhea, nausea and vomiting.   Endocrine: Negative.  Negative for cold intolerance and heat intolerance.   Genitourinary: Positive for frequency. Negative for dysuria, hematuria and urgency.        Nocturia   Musculoskeletal: Positive for arthralgias (left hip pain). Negative for back pain and neck pain.   Skin: Negative.  Negative for color change and rash.   Allergic/Immunologic: Negative.  Negative for environmental allergies.   Neurological: Negative.  Negative for dizziness, syncope, light-headedness and headaches.   Hematological: Negative.  Negative for adenopathy. Does not bruise/bleed easily.   Psychiatric/Behavioral: Negative.  Negative for dysphoric mood. The patient is not nervous/anxious.        Objective   Physical Exam   Constitutional: She is oriented to person, place, and time. She appears well-developed.   HENT:   Head: Normocephalic.   Right Ear: Tympanic  membrane, external ear and ear canal normal.   Left Ear: Tympanic membrane, external ear and ear canal normal.   Nose: Nose normal.   Mouth/Throat: Uvula is midline and oropharynx is clear and moist. Normal dentition. No oropharyngeal exudate, posterior oropharyngeal edema or posterior oropharyngeal erythema.   Eyes: Pupils are equal, round, and reactive to light. Conjunctivae, EOM and lids are normal.   Neck: Trachea normal and normal range of motion. Neck supple. Carotid bruit is not present. No thyroid mass and no thyromegaly present.   Cardiovascular: Normal rate and regular rhythm.    No murmur heard.  Pulmonary/Chest: Effort normal and breath sounds normal. No respiratory distress. She has no decreased breath sounds. She has no wheezes. She has no rhonchi. She has no rales. She exhibits no tenderness.   Abdominal: Soft. Bowel sounds are normal. There is no tenderness.   Musculoskeletal: Normal range of motion.        Left hip: She exhibits normal range of motion and no tenderness.   Neurological: She is alert and oriented to person, place, and time.   Skin: Skin is warm and dry.   Psychiatric: She has a normal mood and affect. Her behavior is normal.   Nursing note and vitals reviewed.      Assessment/Plan   Carlota was seen today for echo results and hip pain.    Diagnoses and all orders for this visit:    Hip pain, left  -     XR Hip With or Without Pelvis 2 - 3 View Left; Future  -     Ambulatory Referral to Physical Therapy Evaluate and treat    Nocturia  -     POC Urinalysis Dipstick, Automated                   Current Outpatient Prescriptions:   •  albuterol (PROVENTIL HFA;VENTOLIN HFA) 108 (90 Base) MCG/ACT inhaler, Inhale 2 puffs Every 4 (Four) Hours As Needed for Wheezing., Disp: 1 inhaler, Rfl: 0  •  BIOTIN FORTE PO, Take 1 tablet by mouth Daily., Disp: , Rfl:   •  Calcium-Magnesium-Vitamin D (CALCIUM MAGNESIUM PO), Take 1 tablet by mouth Daily., Disp: , Rfl:   •  Cholecalciferol (VITAMIN D3) 2000  units capsule, Take 2,000 Units by mouth Daily., Disp: , Rfl:   •  glucosamine-chondroitin 500-400 MG capsule capsule, Take 1 capsule by mouth 2 (Two) Times a Day With Meals., Disp: , Rfl:   •  levocetirizine (XYZAL) 5 MG tablet, Take 5 mg by mouth Every Evening., Disp: , Rfl:   •  levothyroxine (SYNTHROID, LEVOTHROID) 88 MCG tablet, Take 1 tablet by mouth Daily., Disp: , Rfl: 3  •  Multiple Vitamins-Minerals (MULTIVITAMIN ADULT PO), Take 1 tablet by mouth Daily., Disp: , Rfl:   •  Omega-3 Fatty Acids (FISH OIL) 1000 MG capsule capsule, Take 1,000 mg by mouth Daily With Breakfast., Disp: , Rfl:     Return if symptoms worsen or fail to improve, for Recheck.    Recent Results (from the past 168 hour(s))   POC Urinalysis Dipstick, Automated    Collection Time: 10/30/18 12:07 PM   Result Value Ref Range    Color Yellow Yellow, Straw, Dark Yellow, Niyah    Clarity, UA Clear Clear    Specific Gravity  1.015 1.005 - 1.030    pH, Urine 7.0 5.0 - 8.0    Leukocytes Negative Negative    Nitrite, UA Negative Negative    Protein, POC Negative Negative mg/dL    Glucose, UA Negative Negative, 1000 mg/dL (3+) mg/dL    Ketones, UA Negative Negative    Urobilinogen, UA Normal Normal    Bilirubin Negative Negative    Blood, UA Negative Negative       Carlota Brian   Echocardiogram   Order# [id number]   Reading physician: Delvis Laws MD Ordering physician: Anh Ayala MD Study date: 10/18/18   Patient Information     Patient Name  Carlota Brian MRN  [id number] Sex  Female  (Age)  1965 (53 y.o.)   Sedation Narrator Report     Sedation Narrator Report   Interpretation Summary     · Left ventricular systolic function is normal. Estimated EF = 65%.  · There appears to be a small color flow jet across the interatrial septum so a pfo cannot be excluded. Consider bubble contrast to evaluate

## 2018-10-30 NOTE — PATIENT INSTRUCTIONS
Hip Exercises  Ask your health care provider which exercises are safe for you. Do exercises exactly as told by your health care provider and adjust them as directed. It is normal to feel mild stretching, pulling, tightness, or discomfort as you do these exercises, but you should stop right away if you feel sudden pain or your pain gets worse. Do not begin these exercises until told by your health care provider.  STRETCHING AND RANGE OF MOTION EXERCISES  These exercises warm up your muscles and joints and improve the movement and flexibility of your hip. These exercises also help to relieve pain, numbness, and tingling.  Exercise A: Hamstrings, Supine    1. Lie on your back.  2. Loop a belt or towel over the ball of your left / right foot. The ball of your foot is on the walking surface, right under your toes.  3. Straighten your left / right knee and slowly pull on the belt to raise your leg.  ? Do not let your left / right knee bend while you do this.  ? Keep your other leg flat on the floor.  ? Raise the left / right leg until you feel a gentle stretch behind your left / right knee or thigh.  4. Hold this position for __________ seconds.  5. Slowly return your leg to the starting position.  Repeat __________ times. Complete this stretch __________ times a day.  Exercise B: Hip Rotators    1. Lie on your back on a firm surface.  2. Hold your left / right knee with your left / right hand. Hold your ankle with your other hand.  3. Gently pull your left / right knee and rotate your lower leg toward your other shoulder.  ? Pull until you feel a stretch in your buttocks.  ? Keep your hips and shoulders firmly planted while you do this stretch.  4. Hold this position for __________ seconds.  Repeat __________ times. Complete this stretch __________ times a day.  Exercise C: V-Sit (Hamstrings and Adductors)    1. Sit on the floor with your legs extended in a large “V” shape. Keep your knees straight during this  exercise.  2. Start with your head and chest upright, then bend at your waist to reach for your left foot (position A). You should feel a stretch in your right inner thigh.  3. Hold this position for __________ seconds. Then slowly return to the upright position.  4. Bend at your waist to reach forward (position B). You should feel a stretch behind both of your thighs and knees.  5. Hold this position for __________ seconds. Then slowly return to the upright position.  6. Bend at your waist to reach for your right foot (position C). You should feel a stretch in your left inner thigh.  7. Hold this position for __________ seconds. Then slowly return to the upright position.  Repeat __________ times. Complete this stretch __________ times a day.  Exercise D: Lunge (Hip Flexors)    1. Place your left / right knee on the floor and bend your other knee so that is directly over your ankle. You should be half-kneeling.  2. Keep good posture with your head over your shoulders.  3. Tighten your buttocks to point your tailbone downward. This helps your back to keep from arching too much.  4. You should feel a gentle stretch in the front of your left / right thigh and hip. If you do not feel any resistance, slightly slide your other foot forward and then slowly lunge forward so your knee once again lines up over your ankle.  5. Make sure your tailbone continues to point downward.  6. Hold this position for __________ seconds.  Repeat __________ times. Complete this stretch __________ times a day.  STRENGTHENING EXERCISES  These exercises build strength and endurance in your hip. Endurance is the ability to use your muscles for a long time, even after they get tired.  Exercise E: Bridge (Hip Extensors)    1. Lie on your back on a firm surface with your knees bent and your feet flat on the floor.  2. Tighten your buttocks muscles and lift your bottom off the floor until the trunk of your body is level with your thighs.  ? Do not  arch your back.  ? You should feel the muscles working in your buttocks and the back of your thighs. If you do not feel these muscles, slide your feet 1-2 inches (2.5-5 cm) farther away from your buttocks.  3. Hold this position for __________ seconds.  4. Slowly lower your hips to the starting position.  5. Let your muscles relax completely between repetitions.  6. If this exercise is too easy, try doing it with your arms crossed over your chest.  Repeat __________ times. Complete this exercise __________ times a day.  Exercise F: Straight Leg Raises - Hip Abductors    1. Lie on your side with your left / right leg in the top position. Lie so your head, shoulder, knee, and hip line up with each other. You may bend your bottom knee to help you balance.  2. Roll your hips slightly forward, so your hips are stacked directly over each other and your left / right knee is facing forward.  3. Leading with your heel, lift your top leg 4-6 inches (10-15 cm). You should feel the muscles in your outer hip lifting.  ? Do not let your foot drift forward.  ? Do not let your knee roll toward the ceiling.  4. Hold this position for __________ seconds.  5. Slowly return to the starting position.  6. Let your muscles relax completely between repetitions.  Repeat __________ times. Complete this exercise __________ times a day.  Exercise G: Straight Leg Raises - Hip Adductors    1. Lie on your side with your left / right leg in the bottom position. Lie so your head, shoulder, knee, and hip line up. You may place your upper foot in front to help you balance.  2. Roll your hips slightly forward, so your hips are stacked directly over each other and your left / right knee is facing forward.  3. Tense the muscles in your inner thigh and lift your bottom leg 4-6 inches (10-15 cm).  4. Hold this position for __________ seconds.  5. Slowly return to the starting position.  6. Let your muscles relax completely between repetitions.  Repeat  "__________ times. Complete this exercise __________ times a day.  Exercise H: Straight Leg Raises - Quadriceps    1. Lie on your back with your left / right leg extended and your other knee bent.  2. Tense the muscles in the front of your left / right thigh. When you do this, you should see your kneecap slide up or see increased dimpling just above your knee.  3. Tighten these muscles even more and raise your leg 4-6 inches (10-15 cm) off the floor.  4. Hold this position for __________ seconds.  5. Keep these muscles tense as you lower your leg.  6. Relax the muscles slowly and completely between repetitions.  Repeat __________ times. Complete this exercise __________ times a day.  Exercise I: Hip Abductors, Standing  1. Tie one end of a rubber exercise band or tubing to a secure surface, such as a table or pole.  2. Loop the other end of the band or tubing around your left / right ankle.  3. Keeping your ankle with the band or tubing directly opposite of the secured end, step away until there is tension in the tubing or band. Hold onto a chair as needed for balance.  4. Lift your left / right leg out to your side. While you do this:  ? Keep your back upright.  ? Keep your shoulders over your hips.  ? Keep your toes pointing forward.  ? Make sure to use your hip muscles to lift your leg. Do not \"throw\" your leg or tip your body to lift your leg.  5. Hold this position for __________ seconds.  6. Slowly return to the starting position.  Repeat __________ times. Complete this exercise __________ times a day.  Exercise J: Squats (Quadriceps)  1.  a door frame so your feet and knees are in line with the frame. You may place your hands on the frame for balance.  2. Slowly bend your knees and lower your hips like you are going to sit in a chair.  ? Keep your lower legs in a straight-up-and-down position.  ? Do not let your hips go lower than your knees.  ? Do not bend your knees lower than told by your health " care provider.  ? If your hip pain increases, do not bend as low.  3. Hold this position for ___________ seconds.  4. Slowly push with your legs to return to standing. Do not use your hands to pull yourself to standing.  Repeat __________ times. Complete this exercise __________ times a day.  This information is not intended to replace advice given to you by your health care provider. Make sure you discuss any questions you have with your health care provider.  Document Released: 01/05/2007 Document Revised: 09/11/2017 Document Reviewed: 12/12/2016  [employer]vier Interactive Patient Education © 2018 [employer]

## 2019-02-05 ENCOUNTER — OFFICE VISIT (OUTPATIENT)
Dept: OBSTETRICS AND GYNECOLOGY | Facility: CLINIC | Age: 54
End: 2019-02-05

## 2019-02-05 VITALS
HEIGHT: 69 IN | BODY MASS INDEX: 25.71 KG/M2 | SYSTOLIC BLOOD PRESSURE: 110 MMHG | DIASTOLIC BLOOD PRESSURE: 64 MMHG | WEIGHT: 173.6 LBS

## 2019-02-05 DIAGNOSIS — N60.12 FIBROCYSTIC BREAST CHANGES, BILATERAL: ICD-10-CM

## 2019-02-05 DIAGNOSIS — Z01.419 ENCOUNTER FOR GYNECOLOGICAL EXAMINATION WITHOUT ABNORMAL FINDING: ICD-10-CM

## 2019-02-05 DIAGNOSIS — M85.89 OSTEOPENIA OF MULTIPLE SITES: ICD-10-CM

## 2019-02-05 DIAGNOSIS — Z78.0 MENOPAUSE: Primary | ICD-10-CM

## 2019-02-05 DIAGNOSIS — N60.11 FIBROCYSTIC BREAST CHANGES, BILATERAL: ICD-10-CM

## 2019-02-05 PROCEDURE — 99396 PREV VISIT EST AGE 40-64: CPT | Performed by: OBSTETRICS & GYNECOLOGY

## 2019-02-05 NOTE — PROGRESS NOTES
Chief Complaint   Patient presents with   • Gynecologic Exam       Carlota Brian is a 53 y.o. year old  presenting to be seen for her annual exam.  This patient is menopausal and does not take estrogen/progestin therapy.  She denies menopausal symptoms.  She has a history of severe osteopenia, worse at the femoral necks and hips.  She has had no fractures.  Her mother has a history of osteoporosis.  The patient has taken alendronate in the past.  She denies bowel or urinary symptoms.  She states that if she eats soy she has cramping and may have spotting.    SCREENING TESTS    Year 2012   Age                         PAP       Neg.                  HPV high risk                         Mammogram      benign benign                  SHELTON score                         Breast MRI                         Lipids                         Vitamin D                         Colonoscopy                         DEXA  Frax (hip/any)       Severe Penia                  Ovarian Screen                             She exercises regularly: yes.  She wears her seat belt: yes.  She has concerns about domestic violence: no.  She has noticed changes in height: no    GYN screening history:  · Last pap: was done on approximately 2018 and the result was: normal PAP.  · Last mammogram: was done on approximately 2018 and the result was: Birads II (Benign findings).  · Last DEXA: was done on approximately 2018 and the results were: osteopenia of hips, osteopenia of spine and osteopenia of the femoral necks.    No Additional Complaints Reported    The following portions of the patient's history were reviewed and updated as appropriate:vital signs and   She  has a past medical history of Family history of aortic aneurysm, Hypothyroidism, Menopause, Seasonal allergies, and Vitamin D deficiency (5/10/2018).  She does not have  any pertinent problems on file.  She  has a past surgical history that includes Ear Tubes Removal (Bilateral) and Colonoscopy.  Her family history includes Aneurysm in her father, paternal aunt, and paternal uncle; Autism in her son; Dumont's esophagus in her mother; Coronary artery disease in her mother; Dementia in her paternal aunt; Fibromyalgia in her mother; Heart attack in her paternal aunt, paternal grandfather, paternal uncle, and paternal uncle; Hepatitis in her mother; Hyperlipidemia in her mother; Hypertension in her mother; Lung cancer in her paternal aunt; No Known Problems in her paternal grandmother; Peripheral vascular disease in her mother; Scleroderma in her mother; Seizures in her sister; Thyroid disease in her mother and sister.  She  reports that  has never smoked. she has never used smokeless tobacco. She reports that she drinks alcohol. She reports that she does not use drugs.  Current Outpatient Medications   Medication Sig Dispense Refill   • albuterol (PROVENTIL HFA;VENTOLIN HFA) 108 (90 Base) MCG/ACT inhaler Inhale 2 puffs Every 4 (Four) Hours As Needed for Wheezing. 1 inhaler 0   • BIOTIN FORTE PO Take 1 tablet by mouth Daily.     • Calcium-Magnesium-Vitamin D (CALCIUM MAGNESIUM PO) Take 1 tablet by mouth Daily.     • Cholecalciferol (VITAMIN D3) 2000 units capsule Take 2,000 Units by mouth Daily.     • glucosamine-chondroitin 500-400 MG capsule capsule Take 1 capsule by mouth 2 (Two) Times a Day With Meals.     • levocetirizine (XYZAL) 5 MG tablet Take 5 mg by mouth Every Evening.     • levothyroxine (SYNTHROID, LEVOTHROID) 88 MCG tablet Take 1 tablet by mouth Daily.  3   • Multiple Vitamins-Minerals (MULTIVITAMIN ADULT PO) Take 1 tablet by mouth Daily.     • Omega-3 Fatty Acids (FISH OIL) 1000 MG capsule capsule Take 1,000 mg by mouth Daily With Breakfast.       No current facility-administered medications for this visit.      She is allergic to azithromycin..    Review of Systems  A  "comprehensive review of systems was taken.  Constitutional: negative for fever, chills, activity change, appetite change, fatigue and unexpected weight change.  Respiratory: negative  Cardiovascular: negative  Gastrointestinal: negative  Genitourinary:negative  Musculoskeletal:negative  Behavioral/Psych: negative       /64   Ht 175.3 cm (69\")   Wt 78.7 kg (173 lb 9.6 oz)   BMI 25.64 kg/m²     Physical Exam    General:  alert; cooperative; well developed; well nourished   Skin:  No suspicious lesions seen   Thyroid: normal to inspection and palpation   Lungs:  clear to auscultation bilaterally   Heart:  regular rate and rhythm, S1, S2 normal, no murmur, click, rub or gallop   Breasts:  Examined in supine position  Symmetric without masses or skin dimpling  Nipples normal without inversion, lesions or discharge  There are no palpable axillary nodes  Fibrocystic changes are present both breasts without a discrete mass   Abdomen: soft, non-tender; no masses  no umbilical or inguinal hernias are present  no hepato-splenomegaly   Pelvis: Clinical staff was present for exam  External genitalia:  normal appearance of the external genitalia including Bartholin's and [place]'s glands.  Vaginal:  normal pink mucosa without prolapse or lesions.  Cervix:  normal appearance.  Uterus:  normal size, shape and consistency. anteverted;  Adnexa:  non palpable bilaterally.  Rectal:  anus visually normal appearing. recto-vaginal exam unremarkable and confirms findings;     Lab Review   No data reviewed    Imaging  Mammogram results and DEXA         ASSESSMENT  Problems Addressed this Visit        Musculoskeletal and Integument    Osteopenia of multiple sites       Genitourinary    Menopause - Primary       Other    Fibrocystic breast changes, bilateral      Other Visit Diagnoses     Encounter for gynecological examination without abnormal finding              PLAN    · Medications prescribed this encounter:  No orders of the " defined types were placed in this encounter.  · Monthly self breast assessment and annual breast imaging  · DEXA in 1 year  · Calcium, 600 mg/ Vit. D, 400 IU daily; regular weight-bearing exercise  · Follow up: 12 month(s)  *Please note that portions of this documentation may have been completed with a voice recognition program.  Efforts were made to edit this dictation, but occasional words may have been mistranscribed.       This note was electronically signed.    PARVIN Antoine MD  February 5, 2019  2:06 PM

## 2019-05-10 ENCOUNTER — TELEPHONE (OUTPATIENT)
Dept: INTERNAL MEDICINE | Facility: CLINIC | Age: 54
End: 2019-05-10

## 2019-05-10 NOTE — TELEPHONE ENCOUNTER
LVm that pt must be seen in order to have rx. Advised that Dr. Ayala's schedule may be full but she has options to see other providers in office.

## 2019-09-10 ENCOUNTER — LAB (OUTPATIENT)
Dept: LAB | Facility: HOSPITAL | Age: 54
End: 2019-09-10

## 2019-09-10 ENCOUNTER — TRANSCRIBE ORDERS (OUTPATIENT)
Dept: LAB | Facility: HOSPITAL | Age: 54
End: 2019-09-10

## 2019-09-10 DIAGNOSIS — E04.9 ENLARGEMENT OF THYROID: ICD-10-CM

## 2019-09-10 DIAGNOSIS — E04.9 ENLARGEMENT OF THYROID: Primary | ICD-10-CM

## 2019-09-10 LAB
T4 FREE SERPL-MCNC: 1.38 NG/DL (ref 0.93–1.7)
T4 SERPL-MCNC: 8.51 MCG/DL (ref 4.5–11.7)
TSH SERPL DL<=0.05 MIU/L-ACNC: 1.03 UIU/ML (ref 0.27–4.2)

## 2019-09-10 PROCEDURE — 84436 ASSAY OF TOTAL THYROXINE: CPT

## 2019-09-10 PROCEDURE — 36415 COLL VENOUS BLD VENIPUNCTURE: CPT

## 2019-09-10 PROCEDURE — 84479 ASSAY OF THYROID (T3 OR T4): CPT

## 2019-09-10 PROCEDURE — 84439 ASSAY OF FREE THYROXINE: CPT | Performed by: INTERNAL MEDICINE

## 2019-09-10 PROCEDURE — 84443 ASSAY THYROID STIM HORMONE: CPT

## 2019-09-12 LAB — T3RU NFR SERPL: 26 % (ref 24–39)

## 2019-10-31 ENCOUNTER — OFFICE VISIT (OUTPATIENT)
Dept: INTERNAL MEDICINE | Facility: CLINIC | Age: 54
End: 2019-10-31

## 2019-10-31 VITALS
BODY MASS INDEX: 25.62 KG/M2 | OXYGEN SATURATION: 99 % | SYSTOLIC BLOOD PRESSURE: 88 MMHG | HEIGHT: 69 IN | TEMPERATURE: 97 F | HEART RATE: 74 BPM | WEIGHT: 173 LBS | DIASTOLIC BLOOD PRESSURE: 62 MMHG

## 2019-10-31 DIAGNOSIS — J06.9 UPPER RESPIRATORY TRACT INFECTION, UNSPECIFIED TYPE: ICD-10-CM

## 2019-10-31 DIAGNOSIS — J40 BRONCHITIS: Primary | ICD-10-CM

## 2019-10-31 PROBLEM — Z67.20 TYPE B BLOOD, RH POSITIVE: Status: ACTIVE | Noted: 2019-10-31

## 2019-10-31 PROCEDURE — 99213 OFFICE O/P EST LOW 20 MIN: CPT | Performed by: FAMILY MEDICINE

## 2019-10-31 RX ORDER — CEFUROXIME AXETIL 500 MG/1
500 TABLET ORAL 2 TIMES DAILY
Qty: 20 TABLET | Refills: 0 | Status: SHIPPED | OUTPATIENT
Start: 2019-10-31 | End: 2020-02-13

## 2019-10-31 RX ORDER — METHYLPREDNISOLONE 4 MG/1
TABLET ORAL
Qty: 21 EACH | Refills: 0 | Status: SHIPPED | OUTPATIENT
Start: 2019-10-31 | End: 2020-02-13

## 2019-10-31 RX ORDER — ALBUTEROL SULFATE 90 UG/1
2 AEROSOL, METERED RESPIRATORY (INHALATION) EVERY 4 HOURS PRN
Qty: 1 INHALER | Refills: 1 | Status: SHIPPED | OUTPATIENT
Start: 2019-10-31

## 2019-10-31 NOTE — PROGRESS NOTES
"Subjective   Carlota Brian is a 54 y.o. female.     Chief Complaint   Patient presents with   • URI     2 weeks and isn't getting any better. Her albuterol was  and hasn't used it.       Visit Vitals  BP (!) 88/62 (BP Location: Right arm, Cuff Size: Adult)   Pulse 74   Temp 97 °F (36.1 °C)   Ht 175.3 cm (69\")   Wt 78.5 kg (173 lb)   SpO2 99%   BMI 25.55 kg/m²         URI    This is a new problem. The current episode started 1 to 4 weeks ago (2 weeks). The problem has been waxing and waning. There has been no fever. Associated symptoms include congestion, coughing, sneezing, a sore throat (improved) and wheezing. Pertinent negatives include no abdominal pain, chest pain, diarrhea, dysuria, ear pain, headaches, nausea, neck pain, rash, rhinorrhea or vomiting. She has tried steam, inhaler use and increased fluids for the symptoms. The treatment provided mild relief.      Pt has been having sob and cough for 2 weeks. Humidifer helps.  Pt works as  at [employer].     The following portions of the patient's history were reviewed and updated as appropriate: allergies, current medications, past family history, past medical history, past social history, past surgical history and problem list.    Past Medical History:   Diagnosis Date   • Family history of aortic aneurysm     Dad and P uncles and P aunt:  thoracic ascending   • Hypothyroidism    • Menopause    • Seasonal allergies    • Type B blood, Rh positive 10/31/2019   • Vitamin D deficiency 5/10/2018      Past Surgical History:   Procedure Laterality Date   • COLONOSCOPY      age 48 in [place]   • EAR TUBES Bilateral       Family History   Problem Relation Age of Onset   • Thyroid disease Mother    • Coronary artery disease Mother    • Hypertension Mother    • Hyperlipidemia Mother    • Scleroderma Mother    • Dumont's esophagus Mother    • Peripheral vascular disease Mother    • Hepatitis Mother         ? hep C from transfusion   • Fibromyalgia " Mother    • Aneurysm Father    • Thyroid disease Sister    • Other Brother         repair of mitral valve 2 cord ruptured   • Autism Son    • Seizures Sister    • Heart attack Paternal Aunt    • Aneurysm Paternal Aunt    • Heart attack Paternal Uncle    • No Known Problems Paternal Grandmother    • Aneurysm Paternal Uncle    • Heart attack Paternal Uncle    • Dementia Paternal Aunt    • Lung cancer Paternal Aunt    • Heart attack Paternal Grandfather       Social History     Socioeconomic History   • Marital status:      Spouse name: Not on file   • Number of children: Not on file   • Years of education: Not on file   • Highest education level: Not on file   Tobacco Use   • Smoking status: Never Smoker   • Smokeless tobacco: Never Used   Substance and Sexual Activity   • Alcohol use: Yes     Comment: occ   • Drug use: No   • Sexual activity: Yes     Partners: Male     Birth control/protection: Post-menopausal     Comment:       Allergies   Allergen Reactions   • Azithromycin Other (See Comments)     Zpack breaks out in rash       Review of Systems   Constitutional: Negative.  Negative for chills, diaphoresis, fatigue and fever.   HENT: Positive for congestion, postnasal drip (improved), sneezing and sore throat (improved). Negative for ear pain, nosebleeds, rhinorrhea and sinus pressure.    Eyes: Negative.  Negative for redness and itching.   Respiratory: Positive for cough, shortness of breath and wheezing.    Cardiovascular: Positive for palpitations (heart racing with albuterol). Negative for chest pain.   Gastrointestinal: Negative.  Negative for abdominal pain, constipation, diarrhea, nausea and vomiting.   Endocrine: Negative.  Negative for cold intolerance and heat intolerance.   Genitourinary: Negative.  Negative for dysuria, frequency, hematuria and urgency.   Musculoskeletal: Positive for arthralgias (right medial knee pain when driving). Negative for back pain and neck pain.   Skin:  Negative.  Negative for color change and rash.   Allergic/Immunologic: Negative.  Negative for environmental allergies.   Neurological: Negative.  Negative for dizziness, syncope, light-headedness and headaches.   Hematological: Negative.  Negative for adenopathy. Does not bruise/bleed easily.   Psychiatric/Behavioral: Negative.  Negative for dysphoric mood. The patient is not nervous/anxious.        Objective   Physical Exam   Constitutional: She is oriented to person, place, and time. She appears well-developed.   HENT:   Head: Normocephalic.   Right Ear: Tympanic membrane, external ear and ear canal normal.   Left Ear: Tympanic membrane, external ear and ear canal normal.   Nose: Rhinorrhea present. Right sinus exhibits no maxillary sinus tenderness. Left sinus exhibits no maxillary sinus tenderness.   Mouth/Throat: Uvula is midline, oropharynx is clear and moist and mucous membranes are normal. Normal dentition. No oropharyngeal exudate, posterior oropharyngeal edema or posterior oropharyngeal erythema.   Eyes: Conjunctivae, EOM and lids are normal. Pupils are equal, round, and reactive to light.   Neck: Trachea normal and normal range of motion. Neck supple. Carotid bruit is not present. No thyroid mass and no thyromegaly present.   Cardiovascular: Normal rate and regular rhythm.   No murmur heard.  Pulmonary/Chest: Effort normal. No respiratory distress. She has no decreased breath sounds. She has no wheezes. She has rhonchi (with cough). She has no rales. She exhibits no tenderness.   Abdominal: Soft. Bowel sounds are normal. There is no tenderness.   Musculoskeletal: Normal range of motion.        Right knee: Tenderness found. Medial joint line tenderness noted.        Legs:  Neurological: She is alert and oriented to person, place, and time.   Skin: Skin is warm and dry.   Psychiatric: She has a normal mood and affect. Her behavior is normal.   Nursing note and vitals reviewed.      Assessment/Plan   Carlota  was seen today for uri.    Diagnoses and all orders for this visit:    Bronchitis    Upper respiratory tract infection, unspecified type  -     albuterol sulfate  (90 Base) MCG/ACT inhaler; Inhale 2 puffs Every 4 (Four) Hours As Needed for Wheezing.    Other orders  -     cefuroxime (CEFTIN) 500 MG tablet; Take 1 tablet by mouth 2 (Two) Times a Day.  -     methylPREDNISolone (MEDROL, SISI,) 4 MG tablet; Take as directed on package instructions.                   Current Outpatient Medications:   •  BIOTIN FORTE PO, Take 1 tablet by mouth Daily., Disp: , Rfl:   •  Calcium-Magnesium-Vitamin D (CALCIUM MAGNESIUM PO), Take 1 tablet by mouth Daily., Disp: , Rfl:   •  Cholecalciferol (VITAMIN D3) 2000 units capsule, Take 2,000 Units by mouth Daily., Disp: , Rfl:   •  glucosamine-chondroitin 500-400 MG capsule capsule, Take 1 capsule by mouth 2 (Two) Times a Day With Meals., Disp: , Rfl:   •  levocetirizine (XYZAL) 5 MG tablet, Take 5 mg by mouth Every Evening., Disp: , Rfl:   •  levothyroxine (SYNTHROID, LEVOTHROID) 88 MCG tablet, Take 1 tablet by mouth Daily., Disp: , Rfl: 3  •  Multiple Vitamins-Minerals (MULTIVITAMIN ADULT PO), Take 1 tablet by mouth Daily., Disp: , Rfl:   •  Omega-3 Fatty Acids (FISH OIL) 1000 MG capsule capsule, Take 1,000 mg by mouth Daily With Breakfast., Disp: , Rfl:   •  albuterol sulfate  (90 Base) MCG/ACT inhaler, Inhale 2 puffs Every 4 (Four) Hours As Needed for Wheezing., Disp: 1 inhaler, Rfl: 1  •  cefuroxime (CEFTIN) 500 MG tablet, Take 1 tablet by mouth 2 (Two) Times a Day., Disp: 20 tablet, Rfl: 0  •  methylPREDNISolone (MEDROL, SISI,) 4 MG tablet, Take as directed on package instructions., Disp: 21 each, Rfl: 0    Return if symptoms worsen or fail to improve, for Recheck, Annual.

## 2020-01-02 DIAGNOSIS — M85.89 OSTEOPENIA OF MULTIPLE SITES: Primary | ICD-10-CM

## 2020-01-14 RX ORDER — LEVOCETIRIZINE DIHYDROCHLORIDE 5 MG/1
TABLET, FILM COATED ORAL
Qty: 90 TABLET | Refills: 3 | Status: SHIPPED | OUTPATIENT
Start: 2020-01-14 | End: 2021-03-14

## 2020-02-13 ENCOUNTER — OFFICE VISIT (OUTPATIENT)
Dept: OBSTETRICS AND GYNECOLOGY | Facility: CLINIC | Age: 55
End: 2020-02-13

## 2020-02-13 VITALS
BODY MASS INDEX: 25.71 KG/M2 | WEIGHT: 173.6 LBS | DIASTOLIC BLOOD PRESSURE: 72 MMHG | SYSTOLIC BLOOD PRESSURE: 108 MMHG | HEIGHT: 69 IN

## 2020-02-13 DIAGNOSIS — M85.89 OSTEOPENIA OF MULTIPLE SITES: ICD-10-CM

## 2020-02-13 DIAGNOSIS — Z01.419 ENCOUNTER FOR GYNECOLOGICAL EXAMINATION WITHOUT ABNORMAL FINDING: ICD-10-CM

## 2020-02-13 DIAGNOSIS — Z78.0 MENOPAUSE: Primary | ICD-10-CM

## 2020-02-13 PROCEDURE — 99396 PREV VISIT EST AGE 40-64: CPT | Performed by: OBSTETRICS & GYNECOLOGY

## 2020-02-13 NOTE — PROGRESS NOTES
Chief Complaint   Patient presents with   • Gynecologic Exam       Carlota Brian is a 54 y.o. year old  presenting to be seen for her annual exam.  This patient is menopausal and does not use estrogen/progestin replacement therapy.  She has no history of postmenopausal bleeding.  She denies bowel or urinary symptoms.  She is attempting to lose weight.  She has a history of osteopenia of the spine, total hip, and femoral neck.  She has a repeat DEXA scheduled for 2020.  She is taking calcium with vitamin D and walking regularly.    SCREENING TESTS    Year 2012   Age                         PAP        Neg.                 HPV high risk                         Mammogram       benign benign                 SHELTON score                         Breast MRI                         Lipids                         Vitamin D                         Colonoscopy                         DEXA  Frax (hip/any)       osteopenia                  Ovarian Screen                             She exercises regularly: yes.  She wears her seat belt: yes.  She has concerns about domestic violence: no.  She has noticed changes in height: no    GYN screening history:  · Last pap: was done on approximately 2019 and the result was: normal PAP.  · Last mammogram: was done on approximately 2019 and the result was: Birads II (Benign findings).  · Last DEXA: was done on approximately 2018 and the results were: osteopenia of hips, osteopenia of spine and osteopenia of the femoral necks.    No Additional Complaints Reported    The following portions of the patient's history were reviewed and updated as appropriate:vital signs and   She  has a past medical history of Family history of aortic aneurysm, Hypothyroidism, Menopause, Plantar fasciitis, Seasonal allergies, Type B blood, Rh positive (10/31/2019), and Vitamin D  deficiency (5/10/2018).  She does not have any pertinent problems on file.  She  has a past surgical history that includes Ear Tubes Removal (Bilateral) and Colonoscopy.  Her family history includes Aneurysm in her father, paternal aunt, and paternal uncle; Autism in her son; Dumont's esophagus in her mother; Coronary artery disease in her mother; Dementia in her paternal aunt; Fibromyalgia in her mother; Heart attack in her paternal aunt, paternal grandfather, paternal uncle, and paternal uncle; Hepatitis in her mother; Hyperlipidemia in her mother; Hypertension in her mother; Lung cancer in her paternal aunt; No Known Problems in her paternal grandmother; Other in her brother; Peripheral vascular disease in her mother; Scleroderma in her mother; Seizures in her sister; Thyroid disease in her mother and sister.  She  reports that she has never smoked. She has never used smokeless tobacco. She reports that she drinks alcohol. She reports that she does not use drugs.  Current Outpatient Medications   Medication Sig Dispense Refill   • albuterol sulfate  (90 Base) MCG/ACT inhaler Inhale 2 puffs Every 4 (Four) Hours As Needed for Wheezing. 1 inhaler 1   • Calcium-Magnesium-Vitamin D (CALCIUM MAGNESIUM PO) Take 1 tablet by mouth Daily.     • Cholecalciferol (VITAMIN D3) 2000 units capsule Take 2,000 Units by mouth Daily.     • glucosamine-chondroitin 500-400 MG capsule capsule Take 1 capsule by mouth Every Other Day.     • levocetirizine (XYZAL) 5 MG tablet TAKE 1 TABLET BY MOUTH EVERY DAY 90 tablet 3   • levothyroxine (SYNTHROID, LEVOTHROID) 88 MCG tablet Take 1 tablet by mouth Daily.  3   • Multiple Vitamins-Minerals (MULTIVITAMIN ADULT PO) Take 1 tablet by mouth Daily.       No current facility-administered medications for this visit.      She is allergic to azithromycin..    Review of Systems  A comprehensive review of systems was taken.  Constitutional: negative for fever, chills, activity change, appetite  "change, fatigue and unexpected weight change.  Respiratory: negative  Cardiovascular: negative  Gastrointestinal: negative  Genitourinary:negative  Musculoskeletal:negative  Behavioral/Psych: negative       /72   Ht 175.3 cm (69\")   Wt 78.7 kg (173 lb 9.6 oz)   Breastfeeding No   BMI 25.64 kg/m²     Physical Exam    General:  alert; cooperative; well developed; well nourished   Skin:  No suspicious lesions seen   Thyroid: normal to inspection and palpation   Lungs:  clear to auscultation bilaterally   Heart:  regular rate and rhythm, S1, S2 normal, no murmur, click, rub or gallop   Breasts:  Examined in supine position  Symmetric without masses or skin dimpling  Nipples normal without inversion, lesions or discharge  There are no palpable axillary nodes   Abdomen: soft, non-tender; no masses  no umbilical or inguinal hernias are present  no hepato-splenomegaly   Pelvis: Clinical staff was present for exam  External genitalia:  normal appearance of the external genitalia including Bartholin's and [place]'s glands.  Vaginal:  normal pink mucosa without prolapse or lesions.  Cervix:  normal appearance.  Uterus:  normal size, shape and consistency. anteverted;  Adnexa:  non palpable bilaterally.  Rectal:  anus visually normal appearing. recto-vaginal exam unremarkable and confirms findings;     Lab Review   Pap results    Imaging  Mammogram results and DEXA results         ASSESSMENT  Problems Addressed this Visit        Musculoskeletal and Integument    Osteopenia of multiple sites       Genitourinary    Menopause - Primary      Other Visit Diagnoses     Encounter for gynecological examination without abnormal finding                  Substance History:   reports that she has never smoked. She has never used smokeless tobacco.   reports that she drinks alcohol.   reports that she does not use drugs.    Substance use counseling is not indicated based on patient history.  The patient indicates that her alcohol " intake is social, and controlled.      PLAN    · Medications prescribed this encounter:  No orders of the defined types were placed in this encounter.  · Monthly self breast assessment and annual breast imaging  · DEXA scheduled  · Calcium, 600 mg/ Vit. D, 400 IU daily; regular weight-bearing exercise  · Follow up: 12 month(s)  *Please note that portions of this documentation may have been completed with a voice recognition program.  Efforts were made to edit this dictation, but occasional words may have been mistranscribed.       This note was electronically signed.    PARVIN Antoine MD  February 13, 2020  2:25 PM

## 2020-02-20 ENCOUNTER — OFFICE VISIT (OUTPATIENT)
Dept: INTERNAL MEDICINE | Facility: CLINIC | Age: 55
End: 2020-02-20

## 2020-02-20 VITALS
DIASTOLIC BLOOD PRESSURE: 76 MMHG | WEIGHT: 174.6 LBS | HEART RATE: 73 BPM | SYSTOLIC BLOOD PRESSURE: 120 MMHG | TEMPERATURE: 97.2 F | OXYGEN SATURATION: 99 % | HEIGHT: 69 IN | BODY MASS INDEX: 25.86 KG/M2

## 2020-02-20 DIAGNOSIS — M25.552 HIP PAIN, CHRONIC, LEFT: Primary | ICD-10-CM

## 2020-02-20 DIAGNOSIS — G89.29 HIP PAIN, CHRONIC, LEFT: Primary | ICD-10-CM

## 2020-02-20 PROCEDURE — 99213 OFFICE O/P EST LOW 20 MIN: CPT | Performed by: FAMILY MEDICINE

## 2020-02-20 RX ORDER — CALCIUM ACETATE 667 MG/1
667 TABLET ORAL
COMMUNITY
End: 2021-03-18

## 2020-02-20 NOTE — PATIENT INSTRUCTIONS
Snapping Hip Syndrome    Snapping hip syndrome is a condition that causes a feeling like a snap or a pop in your hip, especially when you walk, stand up from a chair, or swing your leg. Snapping hip syndrome can affect different areas of your hip, including the front, side, or back.  Strong bands of tissue (tendons) attach the muscles in your buttocks, thighs, and pelvis to the bones of your hip. Snapping hip syndrome typically happens when a muscle or tendon moves across a bony part of your hip. Snapping hip can also involve torn or loose structures within the joint.  What are the causes?  This condition is usually caused by tight tendons or muscles around the hip. This often happens from overuse.  What increases the risk?  The following factors may make you more likely to develop this condition:  · Being 15-40 years old.  · Being a dancer, runner, weight , gymnast, or .  · Having had an injury to your hip or pelvis.  · Having an abnormally shaped pelvis or leg (deformity).  What are the signs or symptoms?  Symptoms of this condition include:  · A sensation like a snap or a pop in the front, side, or back of the hip when moving your leg. This can cause pain. The pain typically goes away when you stop moving.  · Tightness in the hip.  · Swelling in the front or side of the hip.  · Leg weakness, especially when trying to lift your leg upward or sideways.  · Difficulty getting out of low chairs.  How is this diagnosed?  This condition is diagnosed based on your symptoms, medical history, and a physical exam.  · Your health care provider may have you move your leg into certain positions and then test your muscle strength.  · You may have tests to rule out other causes of pain. These may include an X-ray, an ultrasound, or an MRI.  · You may also have an injection of a numbing medicine (lidocaine) to see if that reduces your symptoms.  How is this treated?  Treatment for this condition  includes:  · Stopping all activities that cause pain or make your condition worse.  · Icing your hip to relieve pain.  · Taking NSAIDs to reduce pain or having injections of medicine to reduce swelling (corticosteroids).  · Doing range-of-motion and strengthening exercises (physical therapy) as told by your health care provider.  You may need surgery to loosen your muscle and tendon or to remove any loose pieces of cartilage, if this applies.  Follow these instructions at home:  Managing pain, stiffness, and swelling    · If directed, put ice on the injured area.  ? Put ice in a plastic bag.  ? Place a towel between your skin and the bag.  ? Leave the ice on for 20 minutes, 2-3 times a day.  General instructions  · Take over-the-counter and prescription medicines only as told by your health care provider.  · Return to your normal activities as told by your health care provider. Ask your health care provider what activities are safe for you.  · Do physical therapy as told by your health care provider.  · Keep all follow-up visits as told by your health care provider. This is important.  How is this prevented?  · Warm up and stretch before being active.  · Cool down and stretch after being active.  · Give your body time to rest between periods of activity.  · Maintain physical fitness, including:  ? Strength.  ? Flexibility.  Contact a health care provider if:  · You have pain, swelling, and difficulty moving that gets worse.  · Your leg or hip seems weak and feels like it cannot hold you up.  · You cannot stand or walk without severe pain.  Summary  · Snapping hip syndrome is a condition that causes a feeling like a snap or a pop in your hip, especially when you walk, stand up from a chair, or swing your leg.  · This condition is usually caused by tight tendons or muscles around the hip.  · It is treated by stopping activities that make it worse, icing, taking medicines, doing physical therapy, and having surgery if  needed.  This information is not intended to replace advice given to you by your health care provider. Make sure you discuss any questions you have with your health care provider.  Document Released: 12/18/2006 Document Revised: 10/03/2019 Document Reviewed: 10/03/2019  [employer] Interactive Patient Education © 2020 [employer]  Iliotibial Band Syndrome    Iliotibial band syndrome (ITBS) is a condition that often causes knee pain. It can also cause pain in the outside of your hip, thigh, and knee. The iliotibial band is a strip of tissue that runs from the outside of your hip and down your thigh to the outside of your knee.  Repeatedly bending and straightening your knee can irritate the iliotibial band.  What are the causes?  This condition is caused by inflammation and irritation from the friction of the iliotibial band moving over the thigh bone (femur) when you repeatedly bend and straighten your knee.  What increases the risk?  This condition is more likely to develop in people who:  · Frequently change elevation during their workouts.  · Run very long distances.  · Recently increased the length or intensity of their workouts.  · Run downhill often, or just started running downhill.  · Ride a bike very far or often.  You may also be at greater risk if you start a new workout routine without first warming up or if you have a job that requires you to bend, squat, or climb frequently.  What are the signs or symptoms?  Symptoms of this condition include:  · Pain along the outside of your knee that may be worse with activity, especially running or going up and down stairs.  · A “snapping” sensation over your knee.  · Swelling on the outside of your knee.  · Pain or a feeling of tightness in your hip.  How is this diagnosed?  This condition is diagnosed based on your symptoms, medical history, and physical exam. You may also see a health care provider who specializes in reducing pain and increasing mobility  (physical therapist). A physical therapist may do an exam to check your balance, movement, and way of walking or running (gait) to see whether the way you move could contribute to your injury. You may also have tests to measure your strength, flexibility, and range of motion.  How is this treated?  Treatment for this condition includes:  · Resting and limiting exercise.  · Returning to activities gradually.  · Doing range-of-motion and strengthening exercises (physical therapy) as told by your health care provider.  · Including low-impact activities, such as swimming, in your exercise routine.  Follow these instructions at home:  · If directed, apply ice to the injured area.  ? Put ice in a plastic bag.  ? Place a towel between your skin and the bag.  ? Leave the ice on for 20 minutes, 2-3 times per day.  · Return to your normal activities as told by your health care provider. Ask your health care provider what activities are safe for you.  · Keep all follow-up visits with your health care provider. This is important.  Contact a health care provider if:  · Your pain does not improve or gets worse despite treatment.  This information is not intended to replace advice given to you by your health care provider. Make sure you discuss any questions you have with your health care provider.  Document Released: 06/09/2003 Document Revised: 01/19/2018 Document Reviewed: 01/19/2018  [employer] Interactive Patient Education © 2020 [employer]    Trochanteric Bursitis Rehab  Ask your health care provider which exercises are safe for you. Do exercises exactly as told by your health care provider and adjust them as directed. It is normal to feel mild stretching, pulling, tightness, or discomfort as you do these exercises, but you should stop right away if you feel sudden pain or your pain gets worse. Do not begin these exercises until told by your health care provider.  Stretching exercises  These exercises warm up your muscles  and joints and improve the movement and flexibility of your hip. These exercises also help to relieve pain and stiffness.  Exercise A: Iliotibial band stretch    1. Lie on your side with your left / right leg in the top position.  2. Bend your left / right knee and grab your ankle.  3. Slowly bring your knee back so your thigh is behind your body.  4. Slowly lower your knee toward the floor until you feel a gentle stretch on the outside of your left / right thigh. If you do not feel a stretch and your knee will not fall farther, place the heel of your other foot on top of your outer knee and pull your thigh down farther.  5. Hold this position for __________ seconds.  6. Slowly return to the starting position.  Repeat __________ times. Complete this exercise __________ times a day.  Strengthening exercises  These exercises build strength and endurance in your hip and pelvis. Endurance is the ability to use your muscles for a long time, even after they get tired.  Exercise B: Bridge (hip extensors)    1. Lie on your back on a firm surface with your knees bent and your feet flat on the floor.  2. Tighten your buttocks muscles and lift your buttocks off the floor until your trunk is level with your thighs. You should feel the muscles working in your buttocks and the back of your thighs. If this exercise is too easy, try doing it with your arms crossed over your chest.  3. Hold this position for __________ seconds.  4. Slowly return to the starting position.  5. Let your muscles relax completely between repetitions.  Repeat __________ times. Complete this exercise __________ times a day.  Exercise C: Squats (knee extensors and  quadriceps)  1.  front of a table, with your feet and knees pointing straight ahead. You may rest your hands on the table for balance but not for support.  2. Slowly bend your knees and lower your hips like you are going to sit in a chair.  ? Keep your weight over your heels, not over  your toes.  ? Keep your lower legs upright so they are parallel with the table legs.  ? Do not let your hips go lower than your knees.  ? Do not bend lower than told by your health care provider.  ? If your hip pain increases, do not bend as low.  3. Hold this position for __________ seconds.  4. Slowly push with your legs to return to standing. Do not use your hands to pull yourself to standing.  Repeat __________ times. Complete this exercise __________ times a day.  Exercise D: Hip hike  1. Stand sideways on a bottom step. Stand on your left / right leg with your other foot unsupported next to the step. You can hold onto the railing or wall if needed for balance.  2. Keeping your knees straight and your torso square, lift your left / right hip up toward the ceiling.  3. Hold this position for __________ seconds.  4. Slowly let your left / right hip lower toward the floor, past the starting position. Your foot should get closer to the floor. Do not lean or bend your knees.  Repeat __________ times. Complete this exercise __________ times a day.  Exercise E: Single leg stand  1. Stand near a counter or door frame that you can hold onto for balance as needed. It is helpful to  front of a mirror for this exercise so you can watch your hip.  2. Squeeze your left / right buttock muscles then lift up your other foot. Do not let your left / right hip push out to the side.  3. Hold this position for __________ seconds.  Repeat __________ times. Complete this exercise __________ times a day.  This information is not intended to replace advice given to you by your health care provider. Make sure you discuss any questions you have with your health care provider.  Document Released: 01/25/2006 Document Revised: 08/24/2017 Document Reviewed: 12/02/2016  [employer] Interactive Patient Education © 2020 [employer]

## 2020-02-20 NOTE — PROGRESS NOTES
"Subjective   Carlota Brian is a 54 y.o. female.        Chief Complaint   Patient presents with   • Hip Pain     feels like out of socket, left leg going numb, off and on for years, has pt and sees chiropracter.      Visit Vitals   /76 (BP Location: Right arm, Cuff Size: Adult)   Pulse 73   Temp 97.2 °F (36.2 °C)   Ht 175.3 cm (69\")   Wt 79.2 kg (174 lb 9.6 oz)   SpO2 99%   BMI 25.78 kg/m²     Hip Pain   The incident occurred more than 1 week ago. There was no injury mechanism. The pain is present in the left hip. The quality of the pain is described as aching (occasion numbness to foot on left). The pain is at a severity of 1/10. The pain is mild. The pain has been fluctuating since onset. Associated symptoms include numbness. Pertinent negatives include no inability to bear weight, loss of motion, loss of sensation, muscle weakness or tingling. She reports no foreign bodies present. The symptoms are aggravated by weight bearing. She has tried nothing for the symptoms. The treatment provided no relief.   Pt went to PT starting after October. Pt had IT band tenderness. Pt has changed how she gets out of car and IT band is not tender.   Pt has sensation of the left hip popping out of joint.   Pt has increased her activity. Pt has pain when she flexes her left leg and hip and hip feels unstable.   Pt's mother had similar hip problems and pt is worried that she will have lifelong hip problems.   The following portions of the patient's history were reviewed and updated as appropriate: allergies, current medications, past family history, past medical history, past social history, past surgical history and problem list.   Medical History        Past Medical History:   Diagnosis Date   • Family history of aortic aneurysm     Dad and P uncles and P aunt: thoracic ascending   • Hypothyroidism    • Menopause    • Plantar fasciitis    • Seasonal allergies    • Type B blood, Rh positive 10/31/2019   • Vitamin D deficiency " 5/10/2018     Surgical History         Past Surgical History:   Procedure Laterality Date   • COLONOSCOPY      age 48 in [place]   • EAR TUBES Bilateral            Family History   Problem Relation Age of Onset   • Thyroid disease Mother    • Coronary artery disease Mother    • Hypertension Mother    • Hyperlipidemia Mother    • Scleroderma Mother    • Dumont's esophagus Mother    • Peripheral vascular disease Mother    • Hepatitis Mother     ? hep C from transfusion   • Fibromyalgia Mother    • Aneurysm Father    • Thyroid disease Sister    • Other Brother     repair of mitral valve 2 cord ruptured   • Autism Son    • Seizures Sister    • Heart attack Paternal Aunt    • Aneurysm Paternal Aunt    • Heart attack Paternal Uncle    • No Known Problems Paternal Grandmother    • Aneurysm Paternal Uncle    • Heart attack Paternal Uncle    • Dementia Paternal Aunt    • Lung cancer Paternal Aunt    • Heart attack Paternal Grandfather      Social History   Social History         Socioeconomic History   • Marital status:      Spouse name: Not on file   • Number of children: Not on file   • Years of education: Not on file   • Highest education level: Not on file   Tobacco Use   • Smoking status: Never Smoker   • Smokeless tobacco: Never Used   Substance and Sexual Activity   • Alcohol use: Yes     Comment: occ   • Drug use: No   • Sexual activity: Yes     Partners: Male     Birth control/protection: Post-menopausal     Comment:            Allergies   Allergen Reactions   • Blue Dyes (Parenteral) Anaphylaxis   • Azithromycin Rash     Zpack breaks out in rash     Review of Systems   Constitutional: Negative. Negative for chills, diaphoresis, fatigue and fever.   HENT: Negative. Negative for ear pain, nosebleeds, postnasal drip, rhinorrhea, sinus pressure, sneezing and sore throat.   Eyes: Negative. Negative for redness and itching.   Respiratory: Negative. Negative for cough, shortness of breath and wheezing.    Cardiovascular: Negative. Negative for chest pain and palpitations.   Gastrointestinal: Negative. Negative for abdominal pain, constipation, diarrhea, nausea and vomiting.   Endocrine: Negative. Negative for cold intolerance and heat intolerance.   Genitourinary: Negative. Negative for dysuria, frequency, hematuria and urgency.   Musculoskeletal: Positive for arthralgias (left hip pain). Negative for back pain and neck pain.   Skin: Negative. Negative for color change and rash.   Allergic/Immunologic: Negative. Negative for environmental allergies.   Neurological: Positive for numbness. Negative for dizziness, tingling, syncope, light-headedness and headaches.   Hematological: Negative. Negative for adenopathy. Does not bruise/bleed easily.   Psychiatric/Behavioral: Negative. Negative for dysphoric mood. The patient is not nervous/anxious.   Objective   Physical Exam   Constitutional: She is oriented to person, place, and time. She appears well-developed.   HENT:   Head: Normocephalic.   Right Ear: External ear normal.   Left Ear: External ear normal.   Nose: Nose normal.   Eyes: Pupils are equal, round, and reactive to light. Conjunctivae, EOM and lids are normal.   Neck: Trachea normal and normal range of motion. Neck supple. Carotid bruit is not present. No thyroid mass and no thyromegaly present.   Cardiovascular: Normal rate and regular rhythm.   No murmur heard.   Pulmonary/Chest: Effort normal and breath sounds normal. No respiratory distress. She has no decreased breath sounds. She has no wheezes. She has no rhonchi. She has no rales. She exhibits no tenderness.   Abdominal: Soft. Bowel sounds are normal. There is no tenderness.   Musculoskeletal: Normal range of motion.   Lumbar back: She exhibits normal range of motion and no tenderness.   Neurological: She is alert and oriented to person, place, and time.   Skin: Skin is warm and dry.   Psychiatric: She has a normal mood and affect. Her behavior is  normal.   Nursing note and vitals reviewed.   Assessment/Plan   Carlota was seen today for hip pain.   Diagnoses and all orders for this visit:   Hip pain, chronic, left   - XR Hip With or Without Pelvis 2 - 3 View Left; Future     Handout on ITBand and trochanteric bursitis and exercises   otc analgesics     Current Outpatient Medications:   • albuterol sulfate  (90 Base) MCG/ACT inhaler, Inhale 2 puffs Every 4 (Four) Hours As Needed for Wheezing., Disp: 1 inhaler, Rfl: 1   • calcium acetate (PHOSLO) 667 MG tablet, Take 667 mg by mouth 3 (Three) Times a Day With Meals., Disp: , Rfl:   • Cholecalciferol (VITAMIN D3) 2000 units capsule, Take 2,000 Units by mouth Daily., Disp: , Rfl:   • glucosamine-chondroitin 500-400 MG capsule capsule, Take 1 capsule by mouth Every Other Day., Disp: , Rfl:   • levocetirizine (XYZAL) 5 MG tablet, TAKE 1 TABLET BY MOUTH EVERY DAY, Disp: 90 tablet, Rfl: 3   • levothyroxine (SYNTHROID, LEVOTHROID) 88 MCG tablet, Take 1 tablet by mouth Daily., Disp: , Rfl: 3    Return if symptoms worsen or fail to improve, for Recheck.

## 2020-03-05 ENCOUNTER — TELEPHONE (OUTPATIENT)
Dept: INTERNAL MEDICINE | Facility: CLINIC | Age: 55
End: 2020-03-05

## 2020-04-02 ENCOUNTER — OFFICE VISIT (OUTPATIENT)
Dept: INTERNAL MEDICINE | Facility: CLINIC | Age: 55
End: 2020-04-02

## 2020-04-02 DIAGNOSIS — J01.90 SUBACUTE SINUSITIS, UNSPECIFIED LOCATION: ICD-10-CM

## 2020-04-02 DIAGNOSIS — J30.2 SEASONAL ALLERGIES: Primary | ICD-10-CM

## 2020-04-02 DIAGNOSIS — H93.8X3 CONGESTION OF BOTH EARS: ICD-10-CM

## 2020-04-02 PROCEDURE — 99212 OFFICE O/P EST SF 10 MIN: CPT | Performed by: FAMILY MEDICINE

## 2020-04-02 RX ORDER — CEFUROXIME AXETIL 500 MG/1
500 TABLET ORAL 2 TIMES DAILY
Qty: 20 TABLET | Refills: 0 | OUTPATIENT
Start: 2020-04-02 | End: 2020-05-15

## 2020-04-02 NOTE — PROGRESS NOTES
Subjective   Carlota Brian is a 54 y.o. female.     Chief Complaint   Patient presents with   • Sinus Problem     x 1month, xyzal, ears are stopped up, and nothing helping, yellowish      You have chosen to receive care through a telephone visit today. Do you consent to use a telephone visit for your medical care today? Yes    There were no vitals taken for this visit.      Sinus Problem   This is a new problem. The current episode started more than 1 month ago. The problem has been gradually improving since onset. There has been no fever. The fever has been present for 1 to 2 days. The pain is moderate. Associated symptoms include congestion (right mostly and left ear some), coughing (rare), ear pain, headaches (forehead) and sinus pressure. Pertinent negatives include no chills, diaphoresis, hoarse voice, neck pain, shortness of breath, sneezing, sore throat or swollen glands. Past treatments include oral decongestants and saline sprays. The treatment provided no relief.      Pt has been using decongestion and benadryl, without improvement.   Thieves oil has helped decrease headache.  Pt has not had chest congestion.  Pt has used insuflation of ears and nasal irrigation.     Pt has used ceftin in the past with good results.   The following portions of the patient's history were reviewed and updated as appropriate: allergies, current medications, past family history, past medical history, past social history, past surgical history and problem list.    Past Medical History:   Diagnosis Date   • Family history of aortic aneurysm     Dad and P uncles and P aunt:  thoracic ascending   • Hypothyroidism    • Menopause    • Plantar fasciitis    • Seasonal allergies    • Type B blood, Rh positive 10/31/2019   • Vitamin D deficiency 5/10/2018      Past Surgical History:   Procedure Laterality Date   • COLONOSCOPY      age 48 in [place]   • EAR TUBES Bilateral       Family History   Problem Relation Age of Onset   •  Thyroid disease Mother    • Coronary artery disease Mother    • Hypertension Mother    • Hyperlipidemia Mother    • Scleroderma Mother    • Dumont's esophagus Mother    • Peripheral vascular disease Mother    • Hepatitis Mother         ? hep C from transfusion   • Fibromyalgia Mother    • Aneurysm Father    • Thyroid disease Sister    • Other Brother         repair of mitral valve 2 cord ruptured   • Autism Son    • Seizures Sister    • Heart attack Paternal Aunt    • Aneurysm Paternal Aunt    • Heart attack Paternal Uncle    • No Known Problems Paternal Grandmother    • Aneurysm Paternal Uncle    • Heart attack Paternal Uncle    • Dementia Paternal Aunt    • Lung cancer Paternal Aunt    • Heart attack Paternal Grandfather       Social History     Socioeconomic History   • Marital status:      Spouse name: Not on file   • Number of children: Not on file   • Years of education: Not on file   • Highest education level: Not on file   Tobacco Use   • Smoking status: Never Smoker   • Smokeless tobacco: Never Used   Substance and Sexual Activity   • Alcohol use: Yes     Comment: occ   • Drug use: No   • Sexual activity: Yes     Partners: Male     Birth control/protection: Post-menopausal     Comment:       Allergies   Allergen Reactions   • Blue Dyes (Parenteral) Anaphylaxis   • Azithromycin Rash     Zpack breaks out in rash       Review of Systems   Constitutional: Negative.  Negative for chills, diaphoresis, fatigue and fever.        3 days of congestion and fever 1 month ago   HENT: Positive for congestion (right mostly and left ear some), ear pain, hearing loss, postnasal drip, rhinorrhea (yellow), sinus pressure and sinus pain. Negative for hoarse voice, nosebleeds, sneezing and sore throat.    Eyes: Negative.  Negative for redness and itching.   Respiratory: Positive for cough (rare). Negative for shortness of breath and wheezing.    Cardiovascular: Negative.  Negative for chest pain and palpitations.    Gastrointestinal: Negative.  Negative for abdominal pain, constipation, diarrhea, nausea and vomiting.   Endocrine: Negative.  Negative for cold intolerance and heat intolerance.   Genitourinary: Negative.  Negative for dysuria, frequency, hematuria and urgency.   Musculoskeletal: Negative.  Negative for arthralgias, back pain and neck pain.   Skin: Negative.  Negative for color change and rash.   Allergic/Immunologic: Positive for environmental allergies.   Neurological: Positive for headaches (forehead). Negative for dizziness (mild), syncope and light-headedness.   Hematological: Negative.  Negative for adenopathy. Does not bruise/bleed easily.   Psychiatric/Behavioral: Negative.  Negative for dysphoric mood. The patient is not nervous/anxious.        Objective   Physical Exam    Assessment/Plan   Carlota was seen today for sinus problem.    Diagnoses and all orders for this visit:    Seasonal allergies    Subacute sinusitis, unspecified location    Congestion of both ears    Other orders  -     cefuroxime (CEFTIN) 500 MG tablet; Take 1 tablet by mouth 2 (Two) Times a Day.        Discussed steroids, pt will call if not improving. Continue decongestants           Current Outpatient Medications:   •  albuterol sulfate  (90 Base) MCG/ACT inhaler, Inhale 2 puffs Every 4 (Four) Hours As Needed for Wheezing., Disp: 1 inhaler, Rfl: 1  •  calcium acetate (PHOSLO) 667 MG tablet, Take 667 mg by mouth 3 (Three) Times a Day With Meals., Disp: , Rfl:   •  Cholecalciferol (VITAMIN D3) 2000 units capsule, Take 2,000 Units by mouth Daily., Disp: , Rfl:   •  glucosamine-chondroitin 500-400 MG capsule capsule, Take 1 capsule by mouth Every Other Day., Disp: , Rfl:   •  levocetirizine (XYZAL) 5 MG tablet, TAKE 1 TABLET BY MOUTH EVERY DAY, Disp: 90 tablet, Rfl: 3  •  levothyroxine (SYNTHROID, LEVOTHROID) 88 MCG tablet, Take 1 tablet by mouth Daily., Disp: , Rfl: 3  •  cefuroxime (CEFTIN) 500 MG tablet, Take 1 tablet by mouth 2  (Two) Times a Day., Disp: 20 tablet, Rfl: 0    Return if symptoms worsen or fail to improve, for Recheck.     This visit has been rescheduled as a phone visit to comply with patient safety concerns in accordance with CDC recommendations. Total time of discussion was 10 minutes.

## 2020-04-28 ENCOUNTER — APPOINTMENT (OUTPATIENT)
Dept: BONE DENSITY | Facility: HOSPITAL | Age: 55
End: 2020-04-28

## 2020-05-26 ENCOUNTER — APPOINTMENT (OUTPATIENT)
Dept: BONE DENSITY | Facility: HOSPITAL | Age: 55
End: 2020-05-26

## 2020-06-08 ENCOUNTER — HOSPITAL ENCOUNTER (OUTPATIENT)
Dept: BONE DENSITY | Facility: HOSPITAL | Age: 55
Discharge: HOME | End: 2020-06-08
Admitting: OBSTETRICS & GYNECOLOGY

## 2020-06-08 DIAGNOSIS — M85.89 OSTEOPENIA OF MULTIPLE SITES: ICD-10-CM

## 2020-06-08 PROCEDURE — 77080 DXA BONE DENSITY AXIAL: CPT

## 2020-06-15 ENCOUNTER — TELEPHONE (OUTPATIENT)
Dept: OBSTETRICS AND GYNECOLOGY | Facility: CLINIC | Age: 55
End: 2020-06-15

## 2020-06-15 NOTE — TELEPHONE ENCOUNTER
Spoke with patient and asked her if [facility] had contacted her about her appointment with them on 6/30/2020 at 10 am.  Patient stated that they had called her.

## 2020-07-09 ENCOUNTER — LAB (OUTPATIENT)
Dept: INTERNAL MEDICINE | Facility: CLINIC | Age: 55
End: 2020-07-09

## 2020-07-09 ENCOUNTER — LAB (OUTPATIENT)
Dept: LAB | Facility: HOSPITAL | Age: 55
End: 2020-07-09

## 2020-07-09 ENCOUNTER — TRANSCRIBE ORDERS (OUTPATIENT)
Dept: LAB | Facility: HOSPITAL | Age: 55
End: 2020-07-09

## 2020-07-09 DIAGNOSIS — M85.50 ANEURYSMAL BONE CYST: ICD-10-CM

## 2020-07-09 DIAGNOSIS — M85.50 ANEURYSMAL BONE CYST: Primary | ICD-10-CM

## 2020-07-09 LAB
ALBUMIN SERPL-MCNC: 4.7 G/DL (ref 3.5–5.2)
ALBUMIN/GLOB SERPL: 1.6 G/DL
ALP SERPL-CCNC: 85 U/L (ref 39–117)
ALT SERPL W P-5'-P-CCNC: 27 U/L (ref 1–33)
ANION GAP SERPL CALCULATED.3IONS-SCNC: 9.6 MMOL/L (ref 5–15)
AST SERPL-CCNC: 25 U/L (ref 1–32)
BILIRUB SERPL-MCNC: 0.4 MG/DL (ref 0–1.2)
BUN SERPL-MCNC: 15 MG/DL (ref 6–20)
BUN/CREAT SERPL: 18.1 (ref 7–25)
CALCIUM SPEC-SCNC: 9.9 MG/DL (ref 8.6–10.5)
CHLORIDE SERPL-SCNC: 103 MMOL/L (ref 98–107)
CO2 SERPL-SCNC: 25.4 MMOL/L (ref 22–29)
CREAT SERPL-MCNC: 0.83 MG/DL (ref 0.57–1)
GFR SERPL CREATININE-BSD FRML MDRD: 71 ML/MIN/1.73
GLOBULIN UR ELPH-MCNC: 2.9 GM/DL
GLUCOSE SERPL-MCNC: 77 MG/DL (ref 65–99)
POTASSIUM SERPL-SCNC: 4.5 MMOL/L (ref 3.5–5.2)
PROT SERPL-MCNC: 7.6 G/DL (ref 6–8.5)
SODIUM SERPL-SCNC: 138 MMOL/L (ref 136–145)

## 2020-07-09 PROCEDURE — 80053 COMPREHEN METABOLIC PANEL: CPT

## 2020-07-09 PROCEDURE — 82306 VITAMIN D 25 HYDROXY: CPT

## 2020-07-09 PROCEDURE — 36415 COLL VENOUS BLD VENIPUNCTURE: CPT

## 2020-07-10 LAB — 25(OH)D3 SERPL-MCNC: 44.6 NG/ML (ref 30–100)

## 2020-08-28 ENCOUNTER — APPOINTMENT (OUTPATIENT)
Dept: BONE DENSITY | Facility: HOSPITAL | Age: 55
End: 2020-08-28

## 2020-10-12 RX ORDER — LEVOTHYROXINE SODIUM 88 UG/1
88 TABLET ORAL DAILY
Qty: 90 TABLET | Refills: 1 | Status: SHIPPED | OUTPATIENT
Start: 2020-10-12 | End: 2021-04-06 | Stop reason: SDUPTHER

## 2020-10-12 NOTE — TELEPHONE ENCOUNTER
Caller: Roc Aura    Relationship: Self    Best call back number: [phone]     Medication needed:   Requested Prescriptions     Pending Prescriptions Disp Refills   • levothyroxine (SYNTHROID, LEVOTHROID) 88 MCG tablet  3     Sig: Take 1 tablet by mouth Daily.       When do you need the refill by: ASAP    What details did the patient provide when requesting the medication: PATIENT HAS ONE PILL LEFT    Does the patient have less than a 3 day supply:  [x] Yes  [] No    What is the patient's preferred pharmacy: [facility]/PHARMACY #[id number] - [place], [place] - [address] [phone] [facility] [phone]

## 2020-11-16 ENCOUNTER — TELEPHONE (OUTPATIENT)
Dept: INTERNAL MEDICINE | Facility: CLINIC | Age: 55
End: 2020-11-16

## 2020-11-16 DIAGNOSIS — Z01.84 IMMUNITY STATUS TESTING: Primary | ICD-10-CM

## 2020-11-16 NOTE — TELEPHONE ENCOUNTER
Caller: Carlota Brian    Relationship: Self    Best call back number: [phone]    What orders are you requesting (i.e. lab or imaging): ANTIBODIES TEST FOR COVID    In what timeframe would the patient need to come in: AS SOON AS POSSIBLE     Where will you receive your lab/imaging services: AT WHATEVER LAB OFFERS THEM       Additional notes: PATIENT IS NOT SICK BUT BELIEVES SHE HAS HAD IT IN THE PAST AND WANTS TO KNOW.

## 2020-11-16 NOTE — TELEPHONE ENCOUNTER
She can come into our office for the lab work, or to another [facility] lab. Orders will be in the computer.

## 2020-11-19 ENCOUNTER — LAB (OUTPATIENT)
Dept: LAB | Facility: HOSPITAL | Age: 55
End: 2020-11-19

## 2020-11-19 DIAGNOSIS — Z01.84 IMMUNITY STATUS TESTING: ICD-10-CM

## 2020-11-19 PROCEDURE — 86769 SARS-COV-2 COVID-19 ANTIBODY: CPT | Performed by: FAMILY MEDICINE

## 2020-11-20 LAB — SARS-COV-2 AB SERPL QL IA: NEGATIVE

## 2021-03-14 RX ORDER — LEVOCETIRIZINE DIHYDROCHLORIDE 5 MG/1
TABLET, FILM COATED ORAL
Qty: 90 TABLET | Refills: 3 | Status: SHIPPED | OUTPATIENT
Start: 2021-03-14 | End: 2022-02-11

## 2021-03-18 ENCOUNTER — OFFICE VISIT (OUTPATIENT)
Dept: OBSTETRICS AND GYNECOLOGY | Facility: CLINIC | Age: 56
End: 2021-03-18

## 2021-03-18 VITALS
DIASTOLIC BLOOD PRESSURE: 80 MMHG | HEIGHT: 69 IN | BODY MASS INDEX: 25.8 KG/M2 | WEIGHT: 174.2 LBS | SYSTOLIC BLOOD PRESSURE: 112 MMHG

## 2021-03-18 DIAGNOSIS — Z01.419 ENCOUNTER FOR GYNECOLOGICAL EXAMINATION WITHOUT ABNORMAL FINDING: Primary | ICD-10-CM

## 2021-03-18 DIAGNOSIS — N60.12 FIBROCYSTIC BREAST CHANGES, BILATERAL: ICD-10-CM

## 2021-03-18 DIAGNOSIS — N60.11 FIBROCYSTIC BREAST CHANGES, BILATERAL: ICD-10-CM

## 2021-03-18 DIAGNOSIS — M85.89 OSTEOPENIA OF MULTIPLE SITES: ICD-10-CM

## 2021-03-18 DIAGNOSIS — Z01.419 ENCOUNTER FOR ANNUAL ROUTINE GYNECOLOGICAL EXAMINATION: Primary | ICD-10-CM

## 2021-03-18 PROCEDURE — 99396 PREV VISIT EST AGE 40-64: CPT | Performed by: OBSTETRICS & GYNECOLOGY

## 2021-03-18 RX ORDER — MULTIPLE VITAMINS W/ MINERALS TAB 9MG-400MCG
1 TAB ORAL DAILY
COMMUNITY

## 2021-03-18 RX ORDER — ALENDRONATE SODIUM 70 MG/1
70 TABLET ORAL WEEKLY
Qty: 12 TABLET | Refills: 3 | Status: SHIPPED | OUTPATIENT
Start: 2021-03-18 | End: 2022-02-11 | Stop reason: SDUPTHER

## 2021-03-18 RX ORDER — ALENDRONATE SODIUM 70 MG/1
1 TABLET ORAL WEEKLY
COMMUNITY
Start: 2021-02-27 | End: 2021-03-18 | Stop reason: SDUPTHER

## 2021-03-18 NOTE — PROGRESS NOTES
Chief Complaint   Patient presents with   • Annual Exam   • Med Refill       Carlota Brian is a 55 y.o. year old  presenting to be seen for her annual exam.  This patient is menopausal and does not use systemic estrogen/progestin replacement therapy.  She has a diagnosis of osteopenia of multiple sites.  She has had no atraumatic fractures.  She has been treated with alendronate, 70 mg weekly since  of last year.  She has no side effects on this medication.  She has a history of stable, fibrocystic changes of the breast on imaging.  She denies bowel or urinary symptoms.    SCREENING TESTS    Year 2012   Age                         PAP       Neg.                  HPV high risk                         Mammogram         benign                SHELTON score                         Breast MRI                         Lipids                         Vitamin D                         Colonoscopy                         DEXA  Frax (hip/any)         osteopenia                Ovarian Screen                             She exercises regularly: yes.  She wears her seat belt: yes.  She has concerns about domestic violence: no.  She has noticed changes in height: no    GYN screening history:  · Last mammogram: was done on approximately 2020 and the result was: Birads I (Normal).  · Last DEXA: was done on approximately 2020 and the results were: osteopenia of hips and osteopenia of spine.    No Additional Complaints Reported    The following portions of the patient's history were reviewed and updated as appropriate:vital signs and   She  has a past medical history of Family history of aortic aneurysm, Hypothyroidism, Menopause, Plantar fasciitis, Seasonal allergies, Type B blood, Rh positive (10/31/2019), and Vitamin D deficiency (5/10/2018).  She does not have any pertinent problems on file.  She  has a past  surgical history that includes Ear Tubes Removal (Bilateral) and Colonoscopy.  Her family history includes Aneurysm in her father, paternal aunt, and paternal uncle; Autism in her son; Dumont's esophagus in her mother; Coronary artery disease in her mother; Dementia in her paternal aunt; Fibromyalgia in her mother; Heart attack in her paternal aunt, paternal grandfather, paternal uncle, and paternal uncle; Hepatitis in her mother; Hyperlipidemia in her mother; Hypertension in her mother; Lung cancer in her paternal aunt; No Known Problems in her paternal grandmother; Other in her brother; Peripheral vascular disease in her mother; Scleroderma in her mother; Seizures in her sister; Thyroid disease in her mother and sister.  She  reports that she has never smoked. She has never used smokeless tobacco. She reports current alcohol use. She reports that she does not use drugs.  Current Outpatient Medications   Medication Sig Dispense Refill   • Calcium Carbonate 1500 (600 Ca) MG tablet Take 600 mg by mouth Daily.     • multivitamin with minerals (Multivitamin Women 50+) tablet tablet Take 1 tablet by mouth Daily.     • albuterol sulfate  (90 Base) MCG/ACT inhaler Inhale 2 puffs Every 4 (Four) Hours As Needed for Wheezing. 1 inhaler 1   • alendronate (FOSAMAX) 70 MG tablet Take 1 tablet by mouth 1 (One) Time Per Week. 12 tablet 3   • Cholecalciferol (VITAMIN D3) 2000 units capsule Take 2,000 Units by mouth Daily.     • levocetirizine (XYZAL) 5 MG tablet TAKE 1 TABLET BY MOUTH EVERY DAY 90 tablet 3   • levothyroxine (SYNTHROID, LEVOTHROID) 88 MCG tablet Take 1 tablet by mouth Daily. 90 tablet 1     No current facility-administered medications for this visit.     She is allergic to blue dyes (parenteral) and azithromycin..    Review of Systems  A review of systems was taken.  She denies cough, fever, shortness of breath, and loss of her sense of taste or smell  Constitutional: negative for fever, chills, activity  "change, appetite change, fatigue and unexpected weight change.  Respiratory: negative  Cardiovascular: negative  Gastrointestinal: negative  Genitourinary:negative  Musculoskeletal:negative  Behavioral/Psych: negative      Counseling/Anticipatory Guidance Discussed: nutrition, physical activity, healthy weight, injury prevention, immunizations, screenings and self-breast exam      /80   Ht 175.3 cm (69\")   Wt 79 kg (174 lb 3.2 oz)   Breastfeeding No   BMI 25.72 kg/m²     MEDICALLY INDICATED   Physical Exam    General:  alert; cooperative; well developed; well nourished   Skin:  No suspicious lesions seen   Thyroid: normal to inspection and palpation   Lungs:  breathing is unlabored  clear to auscultation bilaterally   Heart:  regular rate and rhythm, S1, S2 normal, no murmur, click, rub or gallop  normal apical impulse   Breasts:  Examined in supine position  Symmetric without masses or skin dimpling  Nipples normal without inversion, lesions or discharge  There are no palpable axillary nodes  Fibrocystic changes are present both breasts without a discrete mass   Abdomen: soft, non-tender; no masses  no umbilical or inguinal hernias are present  no hepato-splenomegaly   Pelvis: Clinical staff was present for exam  External genitalia:  normal appearance of the external genitalia including Bartholin's and [place]'s glands.  Vaginal:  normal pink mucosa without prolapse or lesions.  Cervix:  normal appearance.  Uterus:  normal size, shape and consistency. anteverted;  Adnexa:  non palpable bilaterally.  Rectal:  anus visually normal appearing. recto-vaginal exam unremarkable and confirms findings;     Lab Review   CMP results    Imaging  Mammogram results and DEXA        Advance directives- YES      ASSESSMENT  Problems Addressed this Visit        Genitourinary and Reproductive     Fibrocystic breast changes, bilateral       Musculoskeletal and Injuries    Osteopenia of multiple sites    Relevant Medications    " alendronate (FOSAMAX) 70 MG tablet      Other Visit Diagnoses     Encounter for gynecological examination without abnormal finding    -  Primary      Diagnoses       Codes Comments    Encounter for gynecological examination without abnormal finding    -  Primary ICD-10-CM: Z01.419  ICD-9-CM: V72.31     Fibrocystic breast changes, bilateral     ICD-10-CM: N60.11, N60.12  ICD-9-CM: 610.1     Osteopenia of multiple sites     ICD-10-CM: M85.89  ICD-9-CM: 733.90       Osteopenia of multiple sites and fibrocystic changes of the breast of both chronic problems with a good prognosis with therapy and management.        Substance History:   reports that she has never smoked. She has never used smokeless tobacco.   reports current alcohol use.   reports no history of drug use.    Substance use counseling is not indicated based on patient history.  She indicates that her alcohol intake is social, and controlled.      PLAN    Medications prescribed this encounter:    New Medications Ordered This Visit   Medications   • alendronate (FOSAMAX) 70 MG tablet     Sig: Take 1 tablet by mouth 1 (One) Time Per Week.     Dispense:  12 tablet     Refill:  3   · I have reviewed potential side effects of alendronate therapy with the patient.  She is having none at present  · Monthly self breast assessment and annual breast imaging  · Repeat DEXA in June 2022  · Calcium, 600 mg/ Vit. D, 400 IU daily; regular weight-bearing exercise  · Follow up: 12 month(s)  *Please note that portions of this documentation may have been completed with a voice recognition program.  Efforts were made to edit this dictation, but occasional words may have been mistranscribed.       This note was electronically signed.    PARVIN Antoine MD  March 18, 2021  15:26 EDT

## 2021-04-04 RX ORDER — LEVOTHYROXINE SODIUM 88 UG/1
TABLET ORAL
Qty: 90 TABLET | Refills: 1 | OUTPATIENT
Start: 2021-04-04

## 2021-04-06 RX ORDER — LEVOTHYROXINE SODIUM 88 UG/1
88 TABLET ORAL DAILY
Qty: 30 TABLET | Refills: 0 | Status: SHIPPED | OUTPATIENT
Start: 2021-04-06 | End: 2021-04-22 | Stop reason: SDUPTHER

## 2021-04-06 NOTE — TELEPHONE ENCOUNTER
Caller: Roc Aura    Relationship: Self    Best call back number: [phone]    Medication needed:   Requested Prescriptions     Pending Prescriptions Disp Refills   • levothyroxine (SYNTHROID, LEVOTHROID) 88 MCG tablet 90 tablet 1     Sig: Take 1 tablet by mouth Daily.       When do you need the refill by: 4/10/2021    What additional details did the patient provide when requesting the medication:   IS NOT SURE IF NEEDS TO HAVE BLOOD WORK DRAWN TO HAVE THIS FILLED - PATIENT REQUESTED CALL IF NEEDS TO COME IN     Does the patient have less than a 3 day supply:  [] Yes  [x] No    What is the patient's preferred pharmacy:    [facility]/pharmacy #[id number] [place], [place] - [address] [phone]

## 2021-04-08 ENCOUNTER — TELEPHONE (OUTPATIENT)
Dept: INTERNAL MEDICINE | Facility: CLINIC | Age: 56
End: 2021-04-08

## 2021-04-08 DIAGNOSIS — E55.9 VITAMIN D DEFICIENCY: Primary | ICD-10-CM

## 2021-04-08 DIAGNOSIS — Z11.59 NEED FOR HEPATITIS C SCREENING TEST: ICD-10-CM

## 2021-04-08 DIAGNOSIS — Z79.899 ENCOUNTER FOR LONG-TERM (CURRENT) USE OF MEDICATIONS: ICD-10-CM

## 2021-04-08 DIAGNOSIS — Z13.220 SCREENING, LIPID: ICD-10-CM

## 2021-04-08 DIAGNOSIS — E03.9 ACQUIRED HYPOTHYROIDISM: ICD-10-CM

## 2021-04-08 NOTE — TELEPHONE ENCOUNTER
Caller: Carlota Brian    Relationship: Self    Best call back number: [phone]    What orders are you requesting (i.e. lab or imaging): LABS    In what timeframe would the patient need to come in: AT APPT ON 04/22  PATIENT WOULD LIKE A CALL BACK REGARDING ANY LABS THAT WILL NEED TO BE DONE FOR THE APPT 04/22.

## 2021-04-09 ENCOUNTER — TELEPHONE (OUTPATIENT)
Dept: INTERNAL MEDICINE | Facility: CLINIC | Age: 56
End: 2021-04-09

## 2021-04-14 ENCOUNTER — LAB (OUTPATIENT)
Dept: LAB | Facility: HOSPITAL | Age: 56
End: 2021-04-14

## 2021-04-14 DIAGNOSIS — Z13.220 SCREENING, LIPID: ICD-10-CM

## 2021-04-14 DIAGNOSIS — E03.9 ACQUIRED HYPOTHYROIDISM: ICD-10-CM

## 2021-04-14 DIAGNOSIS — Z11.59 NEED FOR HEPATITIS C SCREENING TEST: ICD-10-CM

## 2021-04-14 DIAGNOSIS — E55.9 VITAMIN D DEFICIENCY: ICD-10-CM

## 2021-04-14 DIAGNOSIS — Z79.899 ENCOUNTER FOR LONG-TERM (CURRENT) USE OF MEDICATIONS: ICD-10-CM

## 2021-04-14 LAB
25(OH)D3 SERPL-MCNC: 52.2 NG/ML
ALBUMIN SERPL-MCNC: 4.7 G/DL (ref 3.5–5.2)
ALBUMIN/GLOB SERPL: 1.7 G/DL
ALP SERPL-CCNC: 76 U/L (ref 39–117)
ALT SERPL W P-5'-P-CCNC: 24 U/L (ref 1–33)
ANION GAP SERPL CALCULATED.3IONS-SCNC: 9.1 MMOL/L (ref 5–15)
AST SERPL-CCNC: 24 U/L (ref 1–32)
BASOPHILS # BLD AUTO: 0.03 10*3/MM3 (ref 0–0.2)
BASOPHILS NFR BLD AUTO: 0.4 % (ref 0–1.5)
BILIRUB SERPL-MCNC: 0.5 MG/DL (ref 0–1.2)
BUN SERPL-MCNC: 16 MG/DL (ref 6–20)
BUN/CREAT SERPL: 18.2 (ref 7–25)
CALCIUM SPEC-SCNC: 9.9 MG/DL (ref 8.6–10.5)
CHLORIDE SERPL-SCNC: 104 MMOL/L (ref 98–107)
CHOLEST SERPL-MCNC: 231 MG/DL (ref 0–200)
CO2 SERPL-SCNC: 28.9 MMOL/L (ref 22–29)
CREAT SERPL-MCNC: 0.88 MG/DL (ref 0.57–1)
DEPRECATED RDW RBC AUTO: 40.9 FL (ref 37–54)
EOSINOPHIL # BLD AUTO: 0.08 10*3/MM3 (ref 0–0.4)
EOSINOPHIL NFR BLD AUTO: 1.1 % (ref 0.3–6.2)
ERYTHROCYTE [DISTWIDTH] IN BLOOD BY AUTOMATED COUNT: 12.4 % (ref 12.3–15.4)
GFR SERPL CREATININE-BSD FRML MDRD: 66 ML/MIN/1.73
GLOBULIN UR ELPH-MCNC: 2.7 GM/DL
GLUCOSE SERPL-MCNC: 77 MG/DL (ref 65–99)
HCT VFR BLD AUTO: 44.7 % (ref 34–46.6)
HDLC SERPL-MCNC: 84 MG/DL (ref 40–60)
HGB BLD-MCNC: 15 G/DL (ref 12–15.9)
IMM GRANULOCYTES # BLD AUTO: 0.04 10*3/MM3 (ref 0–0.05)
IMM GRANULOCYTES NFR BLD AUTO: 0.6 % (ref 0–0.5)
LDLC SERPL CALC-MCNC: 137 MG/DL (ref 0–100)
LDLC/HDLC SERPL: 1.61 {RATIO}
LYMPHOCYTES # BLD AUTO: 2.8 10*3/MM3 (ref 0.7–3.1)
LYMPHOCYTES NFR BLD AUTO: 39.8 % (ref 19.6–45.3)
MCH RBC QN AUTO: 30.5 PG (ref 26.6–33)
MCHC RBC AUTO-ENTMCNC: 33.6 G/DL (ref 31.5–35.7)
MCV RBC AUTO: 90.9 FL (ref 79–97)
MONOCYTES # BLD AUTO: 0.47 10*3/MM3 (ref 0.1–0.9)
MONOCYTES NFR BLD AUTO: 6.7 % (ref 5–12)
NEUTROPHILS NFR BLD AUTO: 3.62 10*3/MM3 (ref 1.7–7)
NEUTROPHILS NFR BLD AUTO: 51.4 % (ref 42.7–76)
NRBC BLD AUTO-RTO: 0 /100 WBC (ref 0–0.2)
PLATELET # BLD AUTO: 302 10*3/MM3 (ref 140–450)
PMV BLD AUTO: 11.3 FL (ref 6–12)
POTASSIUM SERPL-SCNC: 4.5 MMOL/L (ref 3.5–5.2)
PROT SERPL-MCNC: 7.4 G/DL (ref 6–8.5)
RBC # BLD AUTO: 4.92 10*6/MM3 (ref 3.77–5.28)
SODIUM SERPL-SCNC: 142 MMOL/L (ref 136–145)
T4 FREE SERPL-MCNC: 1.43 NG/DL (ref 0.93–1.7)
TRIGL SERPL-MCNC: 58 MG/DL (ref 0–150)
TSH SERPL DL<=0.05 MIU/L-ACNC: 1.54 UIU/ML (ref 0.27–4.2)
VLDLC SERPL-MCNC: 10 MG/DL (ref 5–40)
WBC # BLD AUTO: 7.04 10*3/MM3 (ref 3.4–10.8)

## 2021-04-14 PROCEDURE — 86803 HEPATITIS C AB TEST: CPT | Performed by: FAMILY MEDICINE

## 2021-04-14 PROCEDURE — 80053 COMPREHEN METABOLIC PANEL: CPT | Performed by: FAMILY MEDICINE

## 2021-04-14 PROCEDURE — 84443 ASSAY THYROID STIM HORMONE: CPT | Performed by: FAMILY MEDICINE

## 2021-04-14 PROCEDURE — 82306 VITAMIN D 25 HYDROXY: CPT | Performed by: FAMILY MEDICINE

## 2021-04-14 PROCEDURE — 85025 COMPLETE CBC W/AUTO DIFF WBC: CPT | Performed by: FAMILY MEDICINE

## 2021-04-14 PROCEDURE — 84439 ASSAY OF FREE THYROXINE: CPT | Performed by: FAMILY MEDICINE

## 2021-04-14 PROCEDURE — 80061 LIPID PANEL: CPT | Performed by: FAMILY MEDICINE

## 2021-04-15 LAB
HCV AB S/CO SERPL IA: <0.1 S/CO RATIO (ref 0–0.9)
HCV AB SERPL QL IA: NORMAL

## 2021-04-22 ENCOUNTER — OFFICE VISIT (OUTPATIENT)
Dept: INTERNAL MEDICINE | Facility: CLINIC | Age: 56
End: 2021-04-22

## 2021-04-22 VITALS
TEMPERATURE: 96.8 F | SYSTOLIC BLOOD PRESSURE: 122 MMHG | HEIGHT: 69 IN | DIASTOLIC BLOOD PRESSURE: 76 MMHG | WEIGHT: 176 LBS | OXYGEN SATURATION: 97 % | BODY MASS INDEX: 26.07 KG/M2 | HEART RATE: 62 BPM | RESPIRATION RATE: 16 BRPM

## 2021-04-22 DIAGNOSIS — E55.9 VITAMIN D DEFICIENCY: ICD-10-CM

## 2021-04-22 DIAGNOSIS — E03.9 ACQUIRED HYPOTHYROIDISM: Primary | ICD-10-CM

## 2021-04-22 PROCEDURE — 99213 OFFICE O/P EST LOW 20 MIN: CPT | Performed by: FAMILY MEDICINE

## 2021-04-22 RX ORDER — LEVOTHYROXINE SODIUM 88 UG/1
88 TABLET ORAL DAILY
Qty: 90 TABLET | Refills: 3 | Status: SHIPPED | OUTPATIENT
Start: 2021-04-22 | End: 2022-02-11

## 2021-04-22 NOTE — PROGRESS NOTES
"Subjective   Carlota Brian is a 55 y.o. female.     Chief Complaint   Patient presents with   • Med Refill       Visit Vitals  /76   Pulse 62   Temp 96.8 °F (36 °C)   Resp 16   Ht 175.3 cm (69.02\")   Wt 79.8 kg (176 lb)   SpO2 97%   BMI 25.98 kg/m²         Hypothyroidism  This is a chronic problem. The current episode started more than 1 year ago. The problem occurs constantly. The problem has been unchanged. Pertinent negatives include no abdominal pain, anorexia, arthralgias, change in bowel habit, chest pain, chills, congestion, coughing, diaphoresis, fatigue, fever, headaches, joint swelling, myalgias, nausea, neck pain, numbness, rash, sore throat, swollen glands, urinary symptoms, vertigo, visual change, vomiting or weakness. Nothing aggravates the symptoms. Treatments tried: thyroid. The treatment provided significant relief.      Pt has hx of vitamin D deficiency.    Pt thinks that she had Covid around Thanksgiving.   Pt got her second Covid vaccine last week.     Pt has reduced her sugar intake since last lab.     The following portions of the patient's history were reviewed and updated as appropriate: allergies, current medications, past family history, past medical history, past social history, past surgical history and problem list.    Past Medical History:   Diagnosis Date   • Family history of aortic aneurysm     Dad and P uncles and P aunt:  thoracic ascending   • Hypothyroidism    • Menopause    • Plantar fasciitis    • Seasonal allergies    • Type B blood, Rh positive 10/31/2019   • Vitamin D deficiency 5/10/2018      Past Surgical History:   Procedure Laterality Date   • COLONOSCOPY      age 48 in [place]   • EAR TUBES Bilateral       Family History   Problem Relation Age of Onset   • Thyroid disease Mother    • Coronary artery disease Mother    • Hypertension Mother    • Hyperlipidemia Mother    • Scleroderma Mother    • Dumont's esophagus Mother    • Peripheral vascular disease Mother  "   • Hepatitis Mother         ? hep C from transfusion   • Fibromyalgia Mother    • Aneurysm Father    • Thyroid disease Sister    • Other Brother         repair of mitral valve 2 cord ruptured   • Autism Son    • Seizures Sister    • Heart attack Paternal Aunt    • Aneurysm Paternal Aunt    • Heart attack Paternal Uncle    • No Known Problems Paternal Grandmother    • Aneurysm Paternal Uncle    • Heart attack Paternal Uncle    • Dementia Paternal Aunt    • Lung cancer Paternal Aunt    • Heart attack Paternal Grandfather       Social History     Socioeconomic History   • Marital status:      Spouse name: Not on file   • Number of children: Not on file   • Years of education: Not on file   • Highest education level: Not on file   Tobacco Use   • Smoking status: Never Smoker   • Smokeless tobacco: Never Used   Substance and Sexual Activity   • Alcohol use: Yes     Comment: occ   • Drug use: No   • Sexual activity: Yes     Partners: Male     Birth control/protection: Post-menopausal     Comment:       Allergies   Allergen Reactions   • Blue Dyes (Parenteral) Anaphylaxis   • Azithromycin Rash     Zpack breaks out in rash       Review of Systems   Constitutional: Negative for chills, diaphoresis, fatigue and fever.   HENT: Negative for congestion and sore throat.    Respiratory: Negative for cough.    Cardiovascular: Negative for chest pain.   Gastrointestinal: Negative for abdominal pain, anorexia, change in bowel habit, nausea and vomiting.   Musculoskeletal: Negative for arthralgias, joint swelling, myalgias and neck pain.   Skin: Negative for rash.   Neurological: Negative for vertigo, weakness, numbness and headaches.     PHQ-2 Depression Screening  Little interest or pleasure in doing things? 0   Feeling down, depressed, or hopeless? 0   PHQ-2 Total Score 0         Objective   Physical Exam  Vitals and nursing note reviewed.   Constitutional:       Appearance: She is well-developed.   HENT:      Head:  Normocephalic.      Right Ear: External ear normal.      Left Ear: External ear normal.      Nose: Nose normal.   Eyes:      General: Lids are normal.      Conjunctiva/sclera: Conjunctivae normal.      Pupils: Pupils are equal, round, and reactive to light.   Neck:      Thyroid: No thyroid mass or thyromegaly.      Vascular: No carotid bruit.      Trachea: Trachea normal.   Cardiovascular:      Rate and Rhythm: Normal rate and regular rhythm.      Heart sounds: No murmur heard.     Pulmonary:      Effort: Pulmonary effort is normal. No respiratory distress.      Breath sounds: Normal breath sounds. No decreased breath sounds, wheezing, rhonchi or rales.   Chest:      Chest wall: No tenderness.   Abdominal:      General: Bowel sounds are normal.      Palpations: Abdomen is soft.      Tenderness: There is no abdominal tenderness.   Musculoskeletal:         General: Normal range of motion.      Cervical back: Normal range of motion and neck supple.   Skin:     General: Skin is warm and dry.   Neurological:      Mental Status: She is alert and oriented to person, place, and time.   Psychiatric:         Behavior: Behavior normal.         Assessment/Plan   Diagnoses and all orders for this visit:    1. Acquired hypothyroidism (Primary)  -     levothyroxine (SYNTHROID, LEVOTHROID) 88 MCG tablet; Take 1 tablet by mouth Daily.  Dispense: 90 tablet; Refill: 3  -     Cancel: TSH; Future  -     Cancel: T4, Free; Future    2. Vitamin D deficiency  -     Cancel: Vitamin D 25 Hydroxy; Future    Other orders  -     Cancel: Comprehensive Metabolic Panel; Future  -     Cancel: Lipid Panel; Future  -     Cancel: CBC & Differential; Future                   Current Outpatient Medications:   •  albuterol sulfate  (90 Base) MCG/ACT inhaler, Inhale 2 puffs Every 4 (Four) Hours As Needed for Wheezing., Disp: 1 inhaler, Rfl: 1  •  alendronate (FOSAMAX) 70 MG tablet, Take 1 tablet by mouth 1 (One) Time Per Week., Disp: 12 tablet, Rfl:  3  •  Calcium Carbonate 1500 (600 Ca) MG tablet, Take 600 mg by mouth Daily., Disp: , Rfl:   •  Cholecalciferol (VITAMIN D3) 2000 units capsule, Take 2,000 Units by mouth Daily., Disp: , Rfl:   •  levocetirizine (XYZAL) 5 MG tablet, TAKE 1 TABLET BY MOUTH EVERY DAY, Disp: 90 tablet, Rfl: 3  •  levothyroxine (SYNTHROID, LEVOTHROID) 88 MCG tablet, Take 1 tablet by mouth Daily., Disp: 90 tablet, Rfl: 3  •  multivitamin with minerals (Multivitamin Women 50+) tablet tablet, Take 1 tablet by mouth Daily., Disp: , Rfl:     Return in about 1 year (around 4/22/2022), or if symptoms worsen or fail to improve, for Recheck, Annual.     Recent Results (from the past 336 hour(s))   TSH    Collection Time: 04/14/21 10:04 AM    Specimen: Blood   Result Value Ref Range    TSH 1.540 0.270 - 4.200 uIU/mL   T4, Free    Collection Time: 04/14/21 10:04 AM    Specimen: Blood   Result Value Ref Range    Free T4 1.43 0.93 - 1.70 ng/dL   Comprehensive Metabolic Panel    Collection Time: 04/14/21 10:04 AM    Specimen: Blood   Result Value Ref Range    Glucose 77 65 - 99 mg/dL    BUN 16 6 - 20 mg/dL    Creatinine 0.88 0.57 - 1.00 mg/dL    Sodium 142 136 - 145 mmol/L    Potassium 4.5 3.5 - 5.2 mmol/L    Chloride 104 98 - 107 mmol/L    CO2 28.9 22.0 - 29.0 mmol/L    Calcium 9.9 8.6 - 10.5 mg/dL    Total Protein 7.4 6.0 - 8.5 g/dL    Albumin 4.70 3.50 - 5.20 g/dL    ALT (SGPT) 24 1 - 33 U/L    AST (SGOT) 24 1 - 32 U/L    Alkaline Phosphatase 76 39 - 117 U/L    Total Bilirubin 0.5 0.0 - 1.2 mg/dL    eGFR Non African Amer 66 >60 mL/min/1.73    Globulin 2.7 gm/dL    A/G Ratio 1.7 g/dL    BUN/Creatinine Ratio 18.2 7.0 - 25.0    Anion Gap 9.1 5.0 - 15.0 mmol/L   Vitamin D 25 Hydroxy    Collection Time: 04/14/21 10:04 AM    Specimen: Blood   Result Value Ref Range    25 Hydroxy, Vitamin D 52.2 ng/ml   Lipid Panel    Collection Time: 04/14/21 10:04 AM    Specimen: Blood   Result Value Ref Range    Total Cholesterol 231 (H) 0 - 200 mg/dL    Triglycerides  58 0 - 150 mg/dL    HDL Cholesterol 84 (H) 40 - 60 mg/dL    LDL Cholesterol  137 (H) 0 - 100 mg/dL    VLDL Cholesterol 10 5 - 40 mg/dL    LDL/HDL Ratio 1.61    HCV Antibody Rfx To Qnt PCR    Collection Time: 04/14/21 10:04 AM    Specimen: Blood   Result Value Ref Range    Hepatitis C Ab <0.1 0.0 - 0.9 s/co ratio   CBC Auto Differential    Collection Time: 04/14/21 10:04 AM    Specimen: Blood   Result Value Ref Range    WBC 7.04 3.40 - 10.80 10*3/mm3    RBC 4.92 3.77 - 5.28 10*6/mm3    Hemoglobin 15.0 12.0 - 15.9 g/dL    Hematocrit 44.7 34.0 - 46.6 %    MCV 90.9 79.0 - 97.0 fL    MCH 30.5 26.6 - 33.0 pg    MCHC 33.6 31.5 - 35.7 g/dL    RDW 12.4 12.3 - 15.4 %    RDW-SD 40.9 37.0 - 54.0 fl    MPV 11.3 6.0 - 12.0 fL    Platelets 302 140 - 450 10*3/mm3    Neutrophil % 51.4 42.7 - 76.0 %    Lymphocyte % 39.8 19.6 - 45.3 %    Monocyte % 6.7 5.0 - 12.0 %    Eosinophil % 1.1 0.3 - 6.2 %    Basophil % 0.4 0.0 - 1.5 %    Immature Grans % 0.6 (H) 0.0 - 0.5 %    Neutrophils, Absolute 3.62 1.70 - 7.00 10*3/mm3    Lymphocytes, Absolute 2.80 0.70 - 3.10 10*3/mm3    Monocytes, Absolute 0.47 0.10 - 0.90 10*3/mm3    Eosinophils, Absolute 0.08 0.00 - 0.40 10*3/mm3    Basophils, Absolute 0.03 0.00 - 0.20 10*3/mm3    Immature Grans, Absolute 0.04 0.00 - 0.05 10*3/mm3    nRBC 0.0 0.0 - 0.2 /100 WBC   Interpretation:    Collection Time: 04/14/21 10:04 AM    Specimen: Blood   Result Value Ref Range    Interpretation Comment

## 2021-08-31 ENCOUNTER — TELEPHONE (OUTPATIENT)
Dept: OBSTETRICS AND GYNECOLOGY | Facility: CLINIC | Age: 56
End: 2021-08-31

## 2021-08-31 DIAGNOSIS — M85.89 OSTEOPENIA OF MULTIPLE SITES: Primary | ICD-10-CM

## 2021-09-05 NOTE — TELEPHONE ENCOUNTER
LVM to rtn call to discuss results. TO THE HUB let pt know that what lucent area cystic of the femoral head is just a denser part of the xray, benign and Dr. Ayala doesn't have a concern for this and if persistent we will refer.   Decreased oral intake

## 2022-02-10 ENCOUNTER — HOSPITAL ENCOUNTER (OUTPATIENT)
Dept: BONE DENSITY | Facility: HOSPITAL | Age: 57
Discharge: HOME | End: 2022-02-10
Admitting: OBSTETRICS & GYNECOLOGY

## 2022-02-10 DIAGNOSIS — E03.9 ACQUIRED HYPOTHYROIDISM: ICD-10-CM

## 2022-02-10 DIAGNOSIS — M85.89 OSTEOPENIA OF MULTIPLE SITES: ICD-10-CM

## 2022-02-10 PROCEDURE — 77080 DXA BONE DENSITY AXIAL: CPT

## 2022-02-11 ENCOUNTER — TELEPHONE (OUTPATIENT)
Dept: OBSTETRICS AND GYNECOLOGY | Facility: CLINIC | Age: 57
End: 2022-02-11

## 2022-02-11 RX ORDER — LEVOCETIRIZINE DIHYDROCHLORIDE 5 MG/1
TABLET, FILM COATED ORAL
Qty: 90 TABLET | Refills: 3 | Status: SHIPPED | OUTPATIENT
Start: 2022-02-11 | End: 2022-10-12 | Stop reason: SDUPTHER

## 2022-02-11 RX ORDER — LEVOTHYROXINE SODIUM 88 UG/1
TABLET ORAL
Qty: 90 TABLET | Refills: 3 | Status: SHIPPED | OUTPATIENT
Start: 2022-02-11 | End: 2023-02-13

## 2022-02-11 RX ORDER — ALENDRONATE SODIUM 70 MG/1
70 TABLET ORAL
Qty: 12 TABLET | Refills: 0 | Status: SHIPPED | OUTPATIENT
Start: 2022-02-11 | End: 2022-08-26 | Stop reason: SDUPTHER

## 2022-02-11 NOTE — TELEPHONE ENCOUNTER
Rx Refill Note  We have received a fax asking for prescription refill.  Requested Prescriptions      No prescriptions requested or ordered in this encounter      Last office visit with prescribing clinician: Visit date not found      Next office visit with prescribing clinician: 3/24/2022            Lesia Lerma MA  02/11/22, 08:36 EST

## 2022-02-11 NOTE — TELEPHONE ENCOUNTER
Rx Refill Note  Requested Prescriptions     Pending Prescriptions Disp Refills   • levothyroxine (SYNTHROID, LEVOTHROID) 88 MCG tablet [Pharmacy Med Name: LEVOTHYROXINE 88 MCG TABLET] 90 tablet 3     Sig: TAKE 1 TABLET BY MOUTH EVERY DAY   • levocetirizine (XYZAL) 5 MG tablet [Pharmacy Med Name: LEVOCETIRIZINE 5 MG TABLET] 90 tablet 3     Sig: TAKE 1 TABLET BY MOUTH EVERY DAY      Last office visit with prescribing clinician: 4/22/2021      Next office visit with prescribing clinician: 4/28/2022 4/14/2021    Kartik Vance MA  02/11/22, 10:04 EST

## 2022-03-24 ENCOUNTER — OFFICE VISIT (OUTPATIENT)
Dept: OBSTETRICS AND GYNECOLOGY | Facility: CLINIC | Age: 57
End: 2022-03-24

## 2022-03-24 VITALS
WEIGHT: 177.4 LBS | BODY MASS INDEX: 26.28 KG/M2 | HEIGHT: 69 IN | DIASTOLIC BLOOD PRESSURE: 80 MMHG | SYSTOLIC BLOOD PRESSURE: 122 MMHG

## 2022-03-24 DIAGNOSIS — Z01.411 ENCOUNTER FOR GYNECOLOGICAL EXAMINATION WITH ABNORMAL FINDING: Primary | ICD-10-CM

## 2022-03-24 DIAGNOSIS — N95.2 ATROPHY OF VAGINA: ICD-10-CM

## 2022-03-24 PROCEDURE — 99396 PREV VISIT EST AGE 40-64: CPT | Performed by: NURSE PRACTITIONER

## 2022-04-28 ENCOUNTER — OFFICE VISIT (OUTPATIENT)
Dept: INTERNAL MEDICINE | Facility: CLINIC | Age: 57
End: 2022-04-28

## 2022-04-28 VITALS
OXYGEN SATURATION: 97 % | TEMPERATURE: 97.1 F | WEIGHT: 177 LBS | BODY MASS INDEX: 26.22 KG/M2 | SYSTOLIC BLOOD PRESSURE: 116 MMHG | DIASTOLIC BLOOD PRESSURE: 76 MMHG | HEART RATE: 82 BPM | HEIGHT: 69 IN

## 2022-04-28 DIAGNOSIS — E03.9 ACQUIRED HYPOTHYROIDISM: ICD-10-CM

## 2022-04-28 DIAGNOSIS — E55.9 VITAMIN D DEFICIENCY: ICD-10-CM

## 2022-04-28 DIAGNOSIS — Z00.00 ANNUAL PHYSICAL EXAM: Primary | ICD-10-CM

## 2022-04-28 LAB
BILIRUB BLD-MCNC: NEGATIVE MG/DL
CLARITY, POC: CLEAR
COLOR UR: YELLOW
EXPIRATION DATE: NORMAL
GLUCOSE UR STRIP-MCNC: NEGATIVE MG/DL
KETONES UR QL: NEGATIVE
LEUKOCYTE EST, POC: NEGATIVE
Lab: NORMAL
NITRITE UR-MCNC: NEGATIVE MG/ML
PH UR: 6 [PH] (ref 5–8)
PROT UR STRIP-MCNC: NEGATIVE MG/DL
RBC # UR STRIP: NEGATIVE /UL
SP GR UR: 1.02 (ref 1–1.03)
UROBILINOGEN UR QL: NORMAL

## 2022-04-28 PROCEDURE — 99396 PREV VISIT EST AGE 40-64: CPT | Performed by: FAMILY MEDICINE

## 2022-04-28 PROCEDURE — 81003 URINALYSIS AUTO W/O SCOPE: CPT | Performed by: FAMILY MEDICINE

## 2022-04-28 RX ORDER — ESTRADIOL 0.1 MG/G
0.5 CREAM VAGINAL 2 TIMES WEEKLY
COMMUNITY
End: 2022-06-23 | Stop reason: ALTCHOICE

## 2022-04-28 NOTE — PROGRESS NOTES
"Subjective   Carlota Brian is a 56 y.o. female.     Chief Complaint   Patient presents with   • Annual Exam       Visit Vitals  /76   Pulse 82   Temp 97.1 °F (36.2 °C)   Ht 175.3 cm (69\")   Wt 80.3 kg (177 lb)   SpO2 97%   BMI 26.14 kg/m²         History of Present Illness   Pt here for annual exam.     Pt has pain when driving that is better when she changes position.  IT bands are better since changing how she gets in and out of car.     The following portions of the patient's history were reviewed and updated as appropriate: allergies, current medications, past family history, past medical history, past social history, past surgical history and problem list.    Past Medical History:   Diagnosis Date   • Basal cell carcinoma (BCC) of forehead 2008    left removed by Mohs   • Family history of aortic aneurysm     Dad and P uncles and P aunt:  thoracic ascending   • Hypothyroidism    • Menopause    • Plantar fasciitis    • Seasonal allergies    • Type B blood, Rh positive 10/31/2019   • Vitamin D deficiency 05/10/2018      Past Surgical History:   Procedure Laterality Date   • COLONOSCOPY      age 48 in [place]   • EAR TUBES Bilateral    • MOHS SURGERY Left 2008    ft forehead BCCA      Family History   Problem Relation Age of Onset   • Thyroid disease Mother    • Coronary artery disease Mother    • Hypertension Mother    • Hyperlipidemia Mother    • Scleroderma Mother    • Dumont's esophagus Mother    • Peripheral vascular disease Mother    • Hepatitis Mother         ? hep C from transfusion   • Fibromyalgia Mother    • Aneurysm Father    • Thyroid disease Sister    • Other Brother         repair of mitral valve 2 cord ruptured   • Autism Son    • Seizures Sister    • Heart attack Paternal Aunt    • Aneurysm Paternal Aunt    • Heart attack Paternal Uncle    • No Known Problems Paternal Grandmother    • Aneurysm Paternal Uncle    • Heart attack Paternal Uncle    • Dementia Paternal Aunt    • Lung cancer " Paternal Aunt    • Heart attack Paternal Grandfather       Social History     Socioeconomic History   • Marital status:    Tobacco Use   • Smoking status: Never Smoker   • Smokeless tobacco: Never Used   Vaping Use   • Vaping Use: Never used   Substance and Sexual Activity   • Alcohol use: Yes     Comment: occ   • Drug use: No   • Sexual activity: Yes     Partners: Male     Birth control/protection: Post-menopausal     Comment:       Allergies   Allergen Reactions   • Blue Dyes (Parenteral) Anaphylaxis   • Azithromycin Rash     Zpack breaks out in rash       Review of Systems   Constitutional: Negative.  Negative for activity change, appetite change, chills, diaphoresis, fatigue, fever and unexpected weight change.   HENT: Negative.  Negative for congestion, dental problem, drooling, ear discharge, ear pain, facial swelling, hearing loss, mouth sores, nosebleeds, postnasal drip, rhinorrhea, sinus pressure, sinus pain, sneezing, sore throat, tinnitus, trouble swallowing and voice change.    Eyes: Negative.  Negative for photophobia, pain, discharge, redness, itching and visual disturbance.   Respiratory: Negative.  Negative for apnea, cough, choking, chest tightness, shortness of breath, wheezing and stridor.    Cardiovascular: Negative.  Negative for chest pain, palpitations and leg swelling.   Gastrointestinal: Negative.  Negative for abdominal distention, abdominal pain, anal bleeding, blood in stool, constipation, diarrhea, nausea, rectal pain and vomiting.   Endocrine: Negative.  Negative for cold intolerance, heat intolerance, polydipsia, polyphagia and polyuria.   Genitourinary: Negative.  Negative for decreased urine volume, difficulty urinating, dyspareunia, dysuria, enuresis, flank pain, frequency, genital sores, hematuria, menstrual problem, pelvic pain, urgency, vaginal bleeding, vaginal discharge and vaginal pain.   Musculoskeletal: Negative.  Negative for arthralgias, back pain, gait  problem, joint swelling, myalgias, neck pain and neck stiffness.   Skin: Negative.  Negative for color change, pallor, rash and wound.   Allergic/Immunologic: Negative.  Negative for environmental allergies, food allergies and immunocompromised state.   Neurological: Negative.  Negative for dizziness, tremors, seizures, syncope, facial asymmetry, speech difficulty, weakness, light-headedness, numbness and headaches.   Hematological: Negative.  Negative for adenopathy. Does not bruise/bleed easily.   Psychiatric/Behavioral: Negative.  Negative for agitation, behavioral problems, confusion, decreased concentration, dysphoric mood, hallucinations, self-injury, sleep disturbance and suicidal ideas. The patient is not nervous/anxious and is not hyperactive.        Objective   Physical Exam  Vitals and nursing note reviewed.   Constitutional:       General: She is not in acute distress.     Appearance: She is well-developed. She is not diaphoretic.   HENT:      Head: Normocephalic and atraumatic.      Right Ear: Tympanic membrane, ear canal and external ear normal.      Left Ear: Tympanic membrane, ear canal and external ear normal.      Nose: Nose normal.      Mouth/Throat:      Lips: Pink.      Mouth: Mucous membranes are moist. Mucous membranes are not pale, not dry and not cyanotic. No injury.      Dentition: Normal dentition.      Tongue: No lesions.      Palate: No mass.      Pharynx: Uvula midline. No pharyngeal swelling, oropharyngeal exudate, posterior oropharyngeal erythema or uvula swelling.   Eyes:      General: Lids are normal. No scleral icterus.        Right eye: No foreign body, discharge or hordeolum.         Left eye: No foreign body, discharge or hordeolum.      Extraocular Movements:      Right eye: Normal extraocular motion and no nystagmus.      Left eye: Normal extraocular motion and no nystagmus.      Conjunctiva/sclera: Conjunctivae normal.      Right eye: Right conjunctiva is not injected. No  chemosis, exudate or hemorrhage.     Left eye: Left conjunctiva is not injected. No chemosis, exudate or hemorrhage.     Pupils: Pupils are equal, round, and reactive to light.   Neck:      Thyroid: No thyroid mass or thyromegaly.      Vascular: No carotid bruit.      Trachea: Trachea normal. No tracheal deviation.   Cardiovascular:      Rate and Rhythm: Normal rate and regular rhythm.      Pulses:           Dorsalis pedis pulses are 2+ on the right side and 2+ on the left side.        Posterior tibial pulses are 2+ on the right side and 2+ on the left side.      Heart sounds: Normal heart sounds. No murmur heard.    No friction rub. No gallop.   Pulmonary:      Effort: Pulmonary effort is normal. No respiratory distress.      Breath sounds: Normal breath sounds. No decreased breath sounds, wheezing, rhonchi or rales.   Chest:      Chest wall: No tenderness. There is no dullness to percussion.   Breasts:      Jamie Score is 5. Breasts are symmetrical.      Right: Normal. No swelling, bleeding, inverted nipple, mass, nipple discharge, skin change, tenderness, axillary adenopathy or supraclavicular adenopathy.      Left: Normal. No swelling, bleeding, inverted nipple, mass, nipple discharge, skin change, tenderness, axillary adenopathy or supraclavicular adenopathy.       Abdominal:      General: Bowel sounds are normal. There is no distension.      Palpations: Abdomen is soft. There is no hepatomegaly, splenomegaly or mass.      Tenderness: There is no abdominal tenderness. There is no guarding or rebound.      Hernia: No hernia is present.   Musculoskeletal:         General: No tenderness or deformity. Normal range of motion.      Cervical back: Normal range of motion and neck supple.   Lymphadenopathy:      Head:      Right side of head: No submandibular adenopathy.      Left side of head: No submandibular adenopathy.      Cervical: No cervical adenopathy.      Right cervical: No superficial, deep or posterior  cervical adenopathy.     Left cervical: No superficial, deep or posterior cervical adenopathy.      Upper Body:      Right upper body: No supraclavicular, axillary or pectoral adenopathy.      Left upper body: No supraclavicular, axillary or pectoral adenopathy.   Skin:     General: Skin is warm and dry.      Findings: No rash.      Nails: There is no clubbing.   Neurological:      Mental Status: She is alert and oriented to person, place, and time.      Cranial Nerves: No cranial nerve deficit.      Sensory: No sensory deficit.      Coordination: Coordination normal.      Deep Tendon Reflexes: Reflexes are normal and symmetric.      Reflex Scores:       Bicep reflexes are 2+ on the right side and 2+ on the left side.       Brachioradialis reflexes are 2+ on the right side and 2+ on the left side.       Patellar reflexes are 2+ on the right side and 2+ on the left side.  Psychiatric:         Attention and Perception: Attention normal.         Mood and Affect: Mood normal.         Speech: Speech normal.         Behavior: Behavior normal.         Thought Content: Thought content normal.         Cognition and Memory: Cognition normal.         Judgment: Judgment normal.         Assessment/Plan   Diagnoses and all orders for this visit:    1. Annual physical exam (Primary)  -     POCT urinalysis dipstick, automated  -     Comprehensive Metabolic Panel; Future  -     Lipid Panel; Future  -     CBC & Differential; Future    2. Vitamin D deficiency  -     Vitamin D 25 Hydroxy; Future    3. Acquired hypothyroidism  -     TSH; Future  -     T4, Free; Future      Please follow a low animal fat diet that is also low in sugar, low in junk food, low in sweet drinks and low in alcohol.  Please increase the amount of fiber in your diet as well as increasing your daily exercise, such as walking.               Current Outpatient Medications:   •  albuterol sulfate  (90 Base) MCG/ACT inhaler, Inhale 2 puffs Every 4 (Four) Hours  As Needed for Wheezing., Disp: 1 inhaler, Rfl: 1  •  alendronate (Fosamax) 70 MG tablet, Take 1 tablet by mouth Every 7 (Seven) Days., Disp: 12 tablet, Rfl: 0  •  Calcium Carbonate 1500 (600 Ca) MG tablet, Take 600 mg by mouth Daily., Disp: , Rfl:   •  Cholecalciferol (VITAMIN D3) 2000 units capsule, Take 2,000 Units by mouth Daily., Disp: , Rfl:   •  ELDERBERRY PO, Take  by mouth., Disp: , Rfl:   •  estradiol (ESTRACE) 0.1 MG/GM vaginal cream, Insert 0.5 g into the vagina 2 (Two) Times a Week., Disp: , Rfl:   •  levocetirizine (XYZAL) 5 MG tablet, TAKE 1 TABLET BY MOUTH EVERY DAY, Disp: 90 tablet, Rfl: 3  •  levothyroxine (SYNTHROID, LEVOTHROID) 88 MCG tablet, TAKE 1 TABLET BY MOUTH EVERY DAY, Disp: 90 tablet, Rfl: 3  •  multivitamin with minerals tablet tablet, Take 1 tablet by mouth Daily., Disp: , Rfl:     Return in about 1 year (around 4/28/2023), or if symptoms worsen or fail to improve, for Annual, Recheck.     Recent Results (from the past 168 hour(s))   POCT urinalysis dipstick, automated    Collection Time: 04/28/22  9:44 AM    Specimen: Urine   Result Value Ref Range    Color Yellow Yellow, Straw, Dark Yellow, Niyah    Clarity, UA Clear Clear    Specific Gravity  1.020 1.005 - 1.030    pH, Urine 6.0 5.0 - 8.0    Leukocytes Negative Negative    Nitrite, UA Negative Negative    Protein, POC Negative Negative mg/dL    Glucose, UA Negative Negative, 1000 mg/dL (3+) mg/dL    Ketones, UA Negative Negative    Urobilinogen, UA Normal Normal    Bilirubin Negative Negative    Blood, UA Negative Negative    Lot Number [id number]     Expiration Date 10/21/2023

## 2022-04-29 ENCOUNTER — LAB (OUTPATIENT)
Dept: LAB | Facility: HOSPITAL | Age: 57
End: 2022-04-29

## 2022-04-29 DIAGNOSIS — E03.9 ACQUIRED HYPOTHYROIDISM: ICD-10-CM

## 2022-04-29 DIAGNOSIS — E55.9 VITAMIN D DEFICIENCY: ICD-10-CM

## 2022-04-29 DIAGNOSIS — Z00.00 ANNUAL PHYSICAL EXAM: ICD-10-CM

## 2022-04-29 LAB
25(OH)D3 SERPL-MCNC: 42.6 NG/ML (ref 30–100)
ALBUMIN SERPL-MCNC: 4.3 G/DL (ref 3.5–5.2)
ALBUMIN/GLOB SERPL: 1.5 G/DL
ALP SERPL-CCNC: 72 U/L (ref 39–117)
ALT SERPL W P-5'-P-CCNC: 28 U/L (ref 1–33)
ANION GAP SERPL CALCULATED.3IONS-SCNC: 10.8 MMOL/L (ref 5–15)
AST SERPL-CCNC: 24 U/L (ref 1–32)
BASOPHILS # BLD AUTO: 0.03 10*3/MM3 (ref 0–0.2)
BASOPHILS NFR BLD AUTO: 0.4 % (ref 0–1.5)
BILIRUB SERPL-MCNC: 0.4 MG/DL (ref 0–1.2)
BUN SERPL-MCNC: 15 MG/DL (ref 6–20)
BUN/CREAT SERPL: 15.6 (ref 7–25)
CALCIUM SPEC-SCNC: 9.5 MG/DL (ref 8.6–10.5)
CHLORIDE SERPL-SCNC: 102 MMOL/L (ref 98–107)
CHOLEST SERPL-MCNC: 201 MG/DL (ref 0–200)
CO2 SERPL-SCNC: 24.2 MMOL/L (ref 22–29)
CREAT SERPL-MCNC: 0.96 MG/DL (ref 0.57–1)
DEPRECATED RDW RBC AUTO: 42.6 FL (ref 37–54)
EGFRCR SERPLBLD CKD-EPI 2021: 68.7 ML/MIN/1.73
EOSINOPHIL # BLD AUTO: 0.09 10*3/MM3 (ref 0–0.4)
EOSINOPHIL NFR BLD AUTO: 1.3 % (ref 0.3–6.2)
ERYTHROCYTE [DISTWIDTH] IN BLOOD BY AUTOMATED COUNT: 13 % (ref 12.3–15.4)
GLOBULIN UR ELPH-MCNC: 2.8 GM/DL
GLUCOSE SERPL-MCNC: 78 MG/DL (ref 65–99)
HCT VFR BLD AUTO: 41.6 % (ref 34–46.6)
HDLC SERPL-MCNC: 77 MG/DL (ref 40–60)
HGB BLD-MCNC: 13.7 G/DL (ref 12–15.9)
IMM GRANULOCYTES # BLD AUTO: 0.02 10*3/MM3 (ref 0–0.05)
IMM GRANULOCYTES NFR BLD AUTO: 0.3 % (ref 0–0.5)
LDLC SERPL CALC-MCNC: 113 MG/DL (ref 0–100)
LDLC/HDLC SERPL: 1.46 {RATIO}
LYMPHOCYTES # BLD AUTO: 3.17 10*3/MM3 (ref 0.7–3.1)
LYMPHOCYTES NFR BLD AUTO: 44.5 % (ref 19.6–45.3)
MCH RBC QN AUTO: 29.3 PG (ref 26.6–33)
MCHC RBC AUTO-ENTMCNC: 32.9 G/DL (ref 31.5–35.7)
MCV RBC AUTO: 88.9 FL (ref 79–97)
MONOCYTES # BLD AUTO: 0.46 10*3/MM3 (ref 0.1–0.9)
MONOCYTES NFR BLD AUTO: 6.5 % (ref 5–12)
NEUTROPHILS NFR BLD AUTO: 3.36 10*3/MM3 (ref 1.7–7)
NEUTROPHILS NFR BLD AUTO: 47 % (ref 42.7–76)
NRBC BLD AUTO-RTO: 0 /100 WBC (ref 0–0.2)
PLATELET # BLD AUTO: 289 10*3/MM3 (ref 140–450)
PMV BLD AUTO: 11.4 FL (ref 6–12)
POTASSIUM SERPL-SCNC: 4.2 MMOL/L (ref 3.5–5.2)
PROT SERPL-MCNC: 7.1 G/DL (ref 6–8.5)
RBC # BLD AUTO: 4.68 10*6/MM3 (ref 3.77–5.28)
SODIUM SERPL-SCNC: 137 MMOL/L (ref 136–145)
T4 FREE SERPL-MCNC: 1.6 NG/DL (ref 0.93–1.7)
TRIGL SERPL-MCNC: 59 MG/DL (ref 0–150)
TSH SERPL DL<=0.05 MIU/L-ACNC: 1.15 UIU/ML (ref 0.27–4.2)
VLDLC SERPL-MCNC: 11 MG/DL (ref 5–40)
WBC NRBC COR # BLD: 7.13 10*3/MM3 (ref 3.4–10.8)

## 2022-04-29 PROCEDURE — 80050 GENERAL HEALTH PANEL: CPT | Performed by: FAMILY MEDICINE

## 2022-04-29 PROCEDURE — 80061 LIPID PANEL: CPT | Performed by: FAMILY MEDICINE

## 2022-04-29 PROCEDURE — 84439 ASSAY OF FREE THYROXINE: CPT | Performed by: FAMILY MEDICINE

## 2022-04-29 PROCEDURE — 82306 VITAMIN D 25 HYDROXY: CPT | Performed by: FAMILY MEDICINE

## 2022-06-23 ENCOUNTER — OFFICE VISIT (OUTPATIENT)
Dept: OBSTETRICS AND GYNECOLOGY | Facility: CLINIC | Age: 57
End: 2022-06-23

## 2022-06-23 VITALS
DIASTOLIC BLOOD PRESSURE: 68 MMHG | BODY MASS INDEX: 26.07 KG/M2 | HEIGHT: 69 IN | WEIGHT: 176 LBS | SYSTOLIC BLOOD PRESSURE: 120 MMHG

## 2022-06-23 DIAGNOSIS — N95.2 ATROPHY OF VAGINA: ICD-10-CM

## 2022-06-23 DIAGNOSIS — Z09 FOLLOW-UP EXAM: Primary | ICD-10-CM

## 2022-06-23 PROCEDURE — 99213 OFFICE O/P EST LOW 20 MIN: CPT | Performed by: NURSE PRACTITIONER

## 2022-06-23 NOTE — PROGRESS NOTES
Chief Complaint  Carlota Brian is a 56 y.o.  female presenting for Follow-up (3 month follow-up.  Assess response to estrogen vaginal cream.)    History of Present Illness  Carlota is a very pleasant 55yo, , here for 3 mo FU of vaginal atrophy (marked pallor with stippling) with new start Compounded Estradiol Cream since late 2022.  She feels better.  SA now not painful.  No infections or UTIs.     The following portions of the patient's history were reviewed and updated as appropriate: allergies, current medications, past family history, past medical history, past social history, past surgical history and problem list.    Allergies   Allergen Reactions   • Blue Dyes (Parenteral) Anaphylaxis   • Azithromycin Rash     Zpack breaks out in rash         Current Outpatient Medications:   •  albuterol sulfate  (90 Base) MCG/ACT inhaler, Inhale 2 puffs Every 4 (Four) Hours As Needed for Wheezing., Disp: 1 inhaler, Rfl: 1  •  alendronate (Fosamax) 70 MG tablet, Take 1 tablet by mouth Every 7 (Seven) Days., Disp: 12 tablet, Rfl: 0  •  Calcium Carbonate 1500 (600 Ca) MG tablet, Take 600 mg by mouth Daily., Disp: , Rfl:   •  Cholecalciferol (VITAMIN D3) 2000 units capsule, Take 2,000 Units by mouth Daily., Disp: , Rfl:   •  ELDERBERRY PO, Take  by mouth., Disp: , Rfl:   •  estradiol (ESTRACE) 0.1 MG/GM vaginal cream, Insert 0.5 g into the vagina 2 (Two) Times a Week., Disp: , Rfl:   •  levocetirizine (XYZAL) 5 MG tablet, TAKE 1 TABLET BY MOUTH EVERY DAY, Disp: 90 tablet, Rfl: 3  •  levothyroxine (SYNTHROID, LEVOTHROID) 88 MCG tablet, TAKE 1 TABLET BY MOUTH EVERY DAY, Disp: 90 tablet, Rfl: 3  •  multivitamin with minerals tablet tablet, Take 1 tablet by mouth Daily., Disp: , Rfl:   •  Unable to find, 1 each 1 (One) Time. Compounded estradiol vaginal cream 0.1 mg/gm 0.5 grams by vaginal route 2 X week., Disp: , Rfl:     Past Medical History:   Diagnosis Date   • Basal cell carcinoma (BCC) of forehead      "left removed by Mohs   • Family history of aortic aneurysm     Dad and P uncles and P aunt:  thoracic ascending   • Hypothyroidism    • Menopause    • Plantar fasciitis    • Seasonal allergies    • Type B blood, Rh positive 10/31/2019   • Vitamin D deficiency 05/10/2018        Past Surgical History:   Procedure Laterality Date   • COLONOSCOPY      age 48 in [place]   • EAR TUBES Bilateral    • MOHS SURGERY Left     ft forehead BCCA       Objective  /68   Ht 175.3 cm (69\")   Wt 79.8 kg (176 lb)   Breastfeeding No   BMI 25.99 kg/m²     Physical Exam  Exam conducted with a chaperone present.   Constitutional:       Appearance: Normal appearance.   Abdominal:      General: Bowel sounds are normal.      Palpations: Abdomen is soft. There is no mass.      Tenderness: There is no abdominal tenderness.   Genitourinary:     General: Normal vulva.      Labia:         Right: No rash or lesion.         Left: No rash or lesion.       Vagina: Normal. No vaginal discharge or erythema.      Cervix: No cervical motion tenderness, discharge, lesion or erythema.      Uterus: Normal. Not enlarged and not tender.       Adnexa: Right adnexa normal and left adnexa normal.        Right: No mass or tenderness.          Left: No mass or tenderness.        Rectum: Normal.      Comments: Vaginal mucosa is now pink / appears well estrogenized.   Anus appears wnl.  (No rectal exam performed.)        Assessment/Plan   Diagnoses and all orders for this visit:    1. Follow-up exam (Primary)    2. Atrophy of vagina    Continue the Compounded Estradiol Cream (0.1mg/gm)  Si.5 g intravag at HS 2x/ week.      Procedures            Return for Annual physical.    Radha Murguia, KACIE  2022  "

## 2022-08-26 ENCOUNTER — TELEPHONE (OUTPATIENT)
Dept: OBSTETRICS AND GYNECOLOGY | Facility: CLINIC | Age: 57
End: 2022-08-26

## 2022-08-26 RX ORDER — ALENDRONATE SODIUM 70 MG/1
70 TABLET ORAL
Qty: 12 TABLET | Refills: 2 | Status: SHIPPED | OUTPATIENT
Start: 2022-08-26 | End: 2023-04-06 | Stop reason: SDUPTHER

## 2022-08-26 NOTE — TELEPHONE ENCOUNTER
Pt calling needs refill of her alendronate   [facility]  [phone]  New pharmacy please

## 2022-09-30 ENCOUNTER — TELEPHONE (OUTPATIENT)
Dept: INTERNAL MEDICINE | Facility: CLINIC | Age: 57
End: 2022-09-30

## 2022-09-30 NOTE — TELEPHONE ENCOUNTER
PN that she had the Tdap on 5/10/2018.  She will being going to [place] in January for a mission trip

## 2022-09-30 NOTE — TELEPHONE ENCOUNTER
Caller: Carlota Brian    Relationship: Self    Best call back number: [phone]    PATIENT CALLING IN TO ASK WHEN SHE GOT THE DTAP IMMUNIZATION?  PLEASE CALL AND ADVISE

## 2022-10-12 RX ORDER — LEVOCETIRIZINE DIHYDROCHLORIDE 5 MG/1
5 TABLET, FILM COATED ORAL DAILY
Qty: 90 TABLET | Refills: 3 | Status: SHIPPED | OUTPATIENT
Start: 2022-10-12

## 2022-10-12 NOTE — TELEPHONE ENCOUNTER
Caller: Roc Carlota KENDRICK    Relationship: Self    Best call back number: [phone]    Requested Prescriptions:   Requested Prescriptions     Pending Prescriptions Disp Refills   • levocetirizine (XYZAL) 5 MG tablet 90 tablet 3     Sig: Take 1 tablet by mouth Daily.      Pharmacy where request should be sent: [id number] [place] [id number]  AT [facility] [phone] [facility] [phone] FX     Additional details provided by patient:     PATIENT ADVISED SHE RAN OUT OF THE MEDICATION LAST NIGHT.     Does the patient have less than a 3 day supply:  [x] Yes  [] No    Mehreen Pearce Rep   10/12/22 08:47 EDT

## 2022-10-12 NOTE — TELEPHONE ENCOUNTER
Refill request levocetirizine   Last refill 2/11/22  Last visit 4/28/22  Next visit 5/4/23    PATIENT ADVISED SHE RAN OUT OF THE MEDICATION LAST NIGHT.

## 2023-02-12 DIAGNOSIS — E03.9 ACQUIRED HYPOTHYROIDISM: ICD-10-CM

## 2023-02-13 RX ORDER — LEVOTHYROXINE SODIUM 88 UG/1
TABLET ORAL
Qty: 180 TABLET | Refills: 0 | Status: SHIPPED | OUTPATIENT
Start: 2023-02-13

## 2023-04-06 ENCOUNTER — OFFICE VISIT (OUTPATIENT)
Dept: OBSTETRICS AND GYNECOLOGY | Facility: CLINIC | Age: 58
End: 2023-04-06
Payer: COMMERCIAL

## 2023-04-06 VITALS
WEIGHT: 176.6 LBS | DIASTOLIC BLOOD PRESSURE: 72 MMHG | SYSTOLIC BLOOD PRESSURE: 118 MMHG | HEIGHT: 69 IN | BODY MASS INDEX: 26.16 KG/M2

## 2023-04-06 DIAGNOSIS — Z01.419 ENCOUNTER FOR GYNECOLOGICAL EXAMINATION WITHOUT ABNORMAL FINDING: Primary | ICD-10-CM

## 2023-04-06 DIAGNOSIS — N95.2 ATROPHY OF VAGINA: ICD-10-CM

## 2023-04-06 DIAGNOSIS — M85.89 OSTEOPENIA OF MULTIPLE SITES: ICD-10-CM

## 2023-04-06 RX ORDER — ALENDRONATE SODIUM 70 MG/1
70 TABLET ORAL
Qty: 12 TABLET | Refills: 3 | Status: SHIPPED | OUTPATIENT
Start: 2023-04-06

## 2023-04-06 NOTE — PROGRESS NOTES
"Chief Complaint  Carlota Brian is a 57 y.o.  female presenting for Annual Exam and Med Refill (Patient will call for refills on compounded estradiol vaginal cream.//Alendronate 70 mg (#12 with refills).  Joel.)    History of Present Illness  Carlota is a very pleasant 58yo woman, , here for annual gyn exam.  She has done very well since last visit here.  No hospitalizations or surgeries.  No new health problems.  She tolerates the bisphosphonates well for osteopenia.  (Her last DEXA showed improvement of 3.6% in spine and 7.0% in hip.)  She is intentional re: calcium & vitamin D intake.  She walked 26K steps yesterday.  And goes to gym several times/ week.  The compounded estradiol cream helps the vaginal atrophy symptoms.  No dyspareunia.  No vaginitis episodes or UTIs.  Otherwise, ROS negative.  Her mammo will be due in last week of May.  All other health procedures up to date.  She travels on mission trips to [facility], so vaccines & foreign travel meds up to date also.    The following portions of the patient's history were reviewed and updated as appropriate: allergies, current medications, past family history, past medical history, past social history, past surgical history and problem list.    Allergies   Allergen Reactions   • Blue Dyes (Parenteral) Anaphylaxis   • Penicillins Other (See Comments)     Patient states that \"cillins\" do not work for her.   • Azithromycin Rash     Zpack breaks out in rash         Current Outpatient Medications:   •  alendronate (Fosamax) 70 MG tablet, Take 1 tablet by mouth Every 7 (Seven) Days., Disp: 12 tablet, Rfl: 3  •  albuterol sulfate  (90 Base) MCG/ACT inhaler, Inhale 2 puffs Every 4 (Four) Hours As Needed for Wheezing., Disp: 1 inhaler, Rfl: 1  •  Calcium Carbonate 1500 (600 Ca) MG tablet, Take 600 mg by mouth Daily., Disp: , Rfl:   •  Cholecalciferol (VITAMIN D3) 2000 units capsule, Take 2,000 Units by mouth Daily., Disp: , Rfl:   •  ELDERBERRY PO, Take " " by mouth., Disp: , Rfl:   •  levocetirizine (XYZAL) 5 MG tablet, Take 1 tablet by mouth Daily., Disp: 90 tablet, Rfl: 3  •  levothyroxine (SYNTHROID, LEVOTHROID) 88 MCG tablet, TAKE 1 TABLET BY MOUTH EVERY DAY, Disp: 180 tablet, Rfl: 0  •  multivitamin with minerals tablet tablet, Take 1 tablet by mouth Daily., Disp: , Rfl:   •  Unable to find, 1 each 1 (One) Time. Compounded estradiol vaginal cream 0.1 mg/gm 0.5 grams by vaginal route 2 X week., Disp: , Rfl:     Past Medical History:   Diagnosis Date   • Basal cell carcinoma (BCC) of forehead 2008    left removed by Mohs   • Family history of aortic aneurysm     Dad and P uncles and P aunt:  thoracic ascending   • Hypothyroidism    • Menopause    • Plantar fasciitis    • Seasonal allergies    • Type B blood, Rh positive 10/31/2019   • Vitamin D deficiency 05/10/2018        Past Surgical History:   Procedure Laterality Date   • COLONOSCOPY      age 48 in [place]   • EAR TUBES Bilateral    • MOHS SURGERY Left 2008    ft forehead BCCA       Objective  /72   Ht 175.3 cm (69\")   Wt 80.1 kg (176 lb 9.6 oz)   Breastfeeding No   BMI 26.08 kg/m²     Physical Exam  Vitals and nursing note reviewed. Exam conducted with a chaperone present.   Constitutional:       Appearance: Normal appearance.   HENT:      Head: Normocephalic.   Neck:      Thyroid: No thyroid mass or thyromegaly.   Cardiovascular:      Rate and Rhythm: Normal rate and regular rhythm.      Heart sounds: Normal heart sounds. No murmur heard.  Pulmonary:      Effort: Pulmonary effort is normal. No respiratory distress.      Breath sounds: Normal breath sounds.   Chest:   Breasts:     Right: No inverted nipple, mass or nipple discharge.      Left: No inverted nipple, mass or nipple discharge.   Abdominal:      Palpations: Abdomen is soft. There is no mass.      Tenderness: There is no abdominal tenderness.   Genitourinary:     General: Normal vulva.      Labia:         Right: No rash, tenderness or " lesion.         Left: No rash, tenderness or lesion.       Vagina: Normal. No vaginal discharge or erythema.      Cervix: No discharge, lesion or erythema.      Uterus: Not enlarged and not tender.       Adnexa:         Right: No mass or tenderness.          Left: No mass or tenderness.        Comments: Vaginal atrophy has resolved; mucosa is pink, intact, appears well estrogenized.  Anus appears wnl.  No rectal exam performed.  Lymphadenopathy:      Upper Body:      Right upper body: No supraclavicular or axillary adenopathy.      Left upper body: No supraclavicular or axillary adenopathy.   Skin:     General: Skin is warm and dry.   Neurological:      Mental Status: She is alert and oriented to person, place, and time.   Psychiatric:         Mood and Affect: Mood normal.         Behavior: Behavior normal.         Assessment/Plan   Diagnoses and all orders for this visit:    1. Encounter for gynecological examination without abnormal finding (Primary)    2. Osteopenia of multiple sites  -     alendronate (Fosamax) 70 MG tablet; Take 1 tablet by mouth Every 7 (Seven) Days.  Dispense: 12 tablet; Refill: 3    3. Atrophy of vagina    Couns re: Calcium & Vit D (food lists given for both), walking & wt bearing exercise.  Couns re: SBE & annual mammograms.  (She believes that she already has mammo appt @ /[facility].)    Procedures    40 to 64: Counseling/Anticipatory Guidance Discussed: nutrition, physical activity, screenings and self-breast exam    Return in about 1 year (around 4/6/2024) for Annual physical.    Radha Murguia, KACIE  04/06/2023

## 2023-05-04 ENCOUNTER — LAB (OUTPATIENT)
Dept: INTERNAL MEDICINE | Facility: CLINIC | Age: 58
End: 2023-05-04
Payer: COMMERCIAL

## 2023-05-04 ENCOUNTER — OFFICE VISIT (OUTPATIENT)
Dept: INTERNAL MEDICINE | Facility: CLINIC | Age: 58
End: 2023-05-04
Payer: COMMERCIAL

## 2023-05-04 VITALS
HEIGHT: 68 IN | WEIGHT: 175 LBS | DIASTOLIC BLOOD PRESSURE: 70 MMHG | HEART RATE: 63 BPM | OXYGEN SATURATION: 98 % | TEMPERATURE: 97.7 F | BODY MASS INDEX: 26.52 KG/M2 | SYSTOLIC BLOOD PRESSURE: 112 MMHG

## 2023-05-04 DIAGNOSIS — E03.9 ACQUIRED HYPOTHYROIDISM: ICD-10-CM

## 2023-05-04 DIAGNOSIS — E55.9 VITAMIN D DEFICIENCY: ICD-10-CM

## 2023-05-04 DIAGNOSIS — J30.2 SEASONAL ALLERGIES: ICD-10-CM

## 2023-05-04 DIAGNOSIS — Z00.00 ANNUAL PHYSICAL EXAM: Primary | ICD-10-CM

## 2023-05-04 DIAGNOSIS — Z00.00 ANNUAL PHYSICAL EXAM: ICD-10-CM

## 2023-05-04 LAB
25(OH)D3 SERPL-MCNC: 40.6 NG/ML (ref 30–100)
ALBUMIN SERPL-MCNC: 4.5 G/DL (ref 3.5–5.2)
ALBUMIN/GLOB SERPL: 1.5 G/DL
ALP SERPL-CCNC: 78 U/L (ref 39–117)
ALT SERPL W P-5'-P-CCNC: 26 U/L (ref 1–33)
ANION GAP SERPL CALCULATED.3IONS-SCNC: 9.1 MMOL/L (ref 5–15)
AST SERPL-CCNC: 25 U/L (ref 1–32)
BASOPHILS # BLD AUTO: 0.03 10*3/MM3 (ref 0–0.2)
BASOPHILS NFR BLD AUTO: 0.3 % (ref 0–1.5)
BILIRUB BLD-MCNC: NEGATIVE MG/DL
BILIRUB SERPL-MCNC: 0.5 MG/DL (ref 0–1.2)
BUN SERPL-MCNC: 17 MG/DL (ref 6–20)
BUN/CREAT SERPL: 19.1 (ref 7–25)
CALCIUM SPEC-SCNC: 9.8 MG/DL (ref 8.6–10.5)
CHLORIDE SERPL-SCNC: 103 MMOL/L (ref 98–107)
CHOLEST SERPL-MCNC: 215 MG/DL (ref 0–200)
CLARITY, POC: CLEAR
CO2 SERPL-SCNC: 27.9 MMOL/L (ref 22–29)
COLOR UR: YELLOW
CREAT SERPL-MCNC: 0.89 MG/DL (ref 0.57–1)
DEPRECATED RDW RBC AUTO: 40.2 FL (ref 37–54)
EGFRCR SERPLBLD CKD-EPI 2021: 74.8 ML/MIN/1.73
EOSINOPHIL # BLD AUTO: 0.17 10*3/MM3 (ref 0–0.4)
EOSINOPHIL NFR BLD AUTO: 2 % (ref 0.3–6.2)
ERYTHROCYTE [DISTWIDTH] IN BLOOD BY AUTOMATED COUNT: 12.7 % (ref 12.3–15.4)
EXPIRATION DATE: NORMAL
GLOBULIN UR ELPH-MCNC: 3 GM/DL
GLUCOSE SERPL-MCNC: 85 MG/DL (ref 65–99)
GLUCOSE UR STRIP-MCNC: NEGATIVE MG/DL
HCT VFR BLD AUTO: 41.4 % (ref 34–46.6)
HDLC SERPL-MCNC: 75 MG/DL (ref 40–60)
HGB BLD-MCNC: 14 G/DL (ref 12–15.9)
IMM GRANULOCYTES # BLD AUTO: 0.02 10*3/MM3 (ref 0–0.05)
IMM GRANULOCYTES NFR BLD AUTO: 0.2 % (ref 0–0.5)
KETONES UR QL: NEGATIVE
LDLC SERPL CALC-MCNC: 124 MG/DL (ref 0–100)
LDLC/HDLC SERPL: 1.62 {RATIO}
LEUKOCYTE EST, POC: NEGATIVE
LYMPHOCYTES # BLD AUTO: 3.79 10*3/MM3 (ref 0.7–3.1)
LYMPHOCYTES NFR BLD AUTO: 43.7 % (ref 19.6–45.3)
Lab: NORMAL
MCH RBC QN AUTO: 29.5 PG (ref 26.6–33)
MCHC RBC AUTO-ENTMCNC: 33.8 G/DL (ref 31.5–35.7)
MCV RBC AUTO: 87.3 FL (ref 79–97)
MONOCYTES # BLD AUTO: 0.62 10*3/MM3 (ref 0.1–0.9)
MONOCYTES NFR BLD AUTO: 7.2 % (ref 5–12)
NEUTROPHILS NFR BLD AUTO: 4.04 10*3/MM3 (ref 1.7–7)
NEUTROPHILS NFR BLD AUTO: 46.6 % (ref 42.7–76)
NITRITE UR-MCNC: NEGATIVE MG/ML
NRBC BLD AUTO-RTO: 0 /100 WBC (ref 0–0.2)
PH UR: 6 [PH] (ref 5–8)
PLATELET # BLD AUTO: 292 10*3/MM3 (ref 140–450)
PMV BLD AUTO: 11 FL (ref 6–12)
POTASSIUM SERPL-SCNC: 4.3 MMOL/L (ref 3.5–5.2)
PROT SERPL-MCNC: 7.5 G/DL (ref 6–8.5)
PROT UR STRIP-MCNC: NEGATIVE MG/DL
RBC # BLD AUTO: 4.74 10*6/MM3 (ref 3.77–5.28)
RBC # UR STRIP: NEGATIVE /UL
SODIUM SERPL-SCNC: 140 MMOL/L (ref 136–145)
SP GR UR: 1.01 (ref 1–1.03)
T4 FREE SERPL-MCNC: 1.49 NG/DL (ref 0.93–1.7)
TRIGL SERPL-MCNC: 92 MG/DL (ref 0–150)
TSH SERPL DL<=0.05 MIU/L-ACNC: 0.93 UIU/ML (ref 0.27–4.2)
UROBILINOGEN UR QL: NORMAL
VLDLC SERPL-MCNC: 16 MG/DL (ref 5–40)
WBC NRBC COR # BLD: 8.67 10*3/MM3 (ref 3.4–10.8)

## 2023-05-04 PROCEDURE — 36415 COLL VENOUS BLD VENIPUNCTURE: CPT | Performed by: FAMILY MEDICINE

## 2023-05-04 PROCEDURE — 82306 VITAMIN D 25 HYDROXY: CPT | Performed by: FAMILY MEDICINE

## 2023-05-04 PROCEDURE — 84439 ASSAY OF FREE THYROXINE: CPT | Performed by: FAMILY MEDICINE

## 2023-05-04 PROCEDURE — 99396 PREV VISIT EST AGE 40-64: CPT | Performed by: FAMILY MEDICINE

## 2023-05-04 PROCEDURE — 81003 URINALYSIS AUTO W/O SCOPE: CPT | Performed by: FAMILY MEDICINE

## 2023-05-04 PROCEDURE — 80061 LIPID PANEL: CPT | Performed by: FAMILY MEDICINE

## 2023-05-04 PROCEDURE — 80050 GENERAL HEALTH PANEL: CPT | Performed by: FAMILY MEDICINE

## 2023-05-04 RX ORDER — LEVOTHYROXINE SODIUM 88 UG/1
88 TABLET ORAL DAILY
Qty: 180 TABLET | Refills: 1 | Status: SHIPPED | OUTPATIENT
Start: 2023-05-04

## 2023-05-04 RX ORDER — LEVOCETIRIZINE DIHYDROCHLORIDE 5 MG/1
5 TABLET, FILM COATED ORAL DAILY
Qty: 90 TABLET | Refills: 3 | Status: SHIPPED | OUTPATIENT
Start: 2023-05-04

## 2023-05-04 NOTE — PROGRESS NOTES
"Subjective   Carlota Brian is a 57 y.o. female.     Chief Complaint   Patient presents with   • Annual Exam     Saw GYN last month       Visit Vitals  /70 (BP Location: Left arm, Patient Position: Sitting, Cuff Size: Adult)   Pulse 63   Temp 97.7 °F (36.5 °C)   Ht 173.5 cm (68.3\")   Wt 79.4 kg (175 lb)   SpO2 98%   BMI 26.38 kg/m²         History of Present Illness   Pt here for annual exam.   Pt has nasal allergies and has chronic nasal drainage.  Pt has mammo scheduled the end of the month.   Pt had GYN exam last month.    The following portions of the patient's history were reviewed and updated as appropriate: allergies, current medications, past family history, past medical history, past social history, past surgical history and problem list.    Past Medical History:   Diagnosis Date   • Basal cell carcinoma (BCC) of forehead 2008    left removed by Mohs   • Family history of aortic aneurysm     Dad and P uncles and P aunt:  thoracic ascending   • Hypothyroidism    • Menopause    • Plantar fasciitis    • Seasonal allergies    • Type B blood, Rh positive 10/31/2019   • Vitamin D deficiency 05/10/2018      Past Surgical History:   Procedure Laterality Date   • COLONOSCOPY      age 48 in [place]   • EAR TUBES Bilateral    • MOHS SURGERY Left 2008    ft forehead BCCA      Family History   Problem Relation Age of Onset   • Stroke Mother         brain bleed   • Thyroid disease Mother    • Coronary artery disease Mother    • Hypertension Mother    • Hyperlipidemia Mother    • Scleroderma Mother    • Dumont's esophagus Mother    • Peripheral vascular disease Mother    • Hepatitis Mother         ? hep C from transfusion   • Fibromyalgia Mother    • Aneurysm Father    • Thyroid disease Sister    • Seizures Sister    • Other Brother         repair of mitral valve 2 cord ruptured   • Autism Son    • Heart attack Paternal Aunt    • Aneurysm Paternal Aunt    • Dementia Paternal Aunt    • Lung cancer Paternal Aunt  " "  • Heart attack Paternal Uncle    • Aneurysm Paternal Uncle    • Heart attack Paternal Uncle    • No Known Problems Paternal Grandmother    • Heart attack Paternal Grandfather       Social History     Socioeconomic History   • Marital status:    Tobacco Use   • Smoking status: Never   • Smokeless tobacco: Never   Vaping Use   • Vaping Use: Never used   Substance and Sexual Activity   • Alcohol use: Yes     Comment: occ   • Drug use: No   • Sexual activity: Yes     Partners: Male     Birth control/protection: Post-menopausal     Comment:       Allergies   Allergen Reactions   • Blue Dyes (Parenteral) Anaphylaxis   • Penicillins Other (See Comments)     Patient states that \"cillins\" do not work for her.   • Azithromycin Rash     Zpack breaks out in rash       Review of Systems   Constitutional: Negative.  Negative for activity change, appetite change, chills, diaphoresis, fatigue, fever and unexpected weight change.   HENT: Positive for rhinorrhea. Negative for congestion, dental problem, drooling, ear discharge, ear pain, facial swelling, hearing loss, mouth sores, nosebleeds, postnasal drip, sinus pressure, sinus pain, sneezing, sore throat, tinnitus, trouble swallowing and voice change.    Eyes: Negative.  Negative for photophobia, pain, discharge, redness, itching and visual disturbance.   Respiratory: Negative.  Negative for apnea, cough, choking, chest tightness, shortness of breath, wheezing and stridor.    Cardiovascular: Negative.  Negative for chest pain, palpitations and leg swelling.   Gastrointestinal: Negative.  Negative for abdominal distention, abdominal pain, anal bleeding, blood in stool, constipation, diarrhea, nausea, rectal pain and vomiting.   Endocrine: Negative.  Negative for cold intolerance, heat intolerance, polydipsia, polyphagia and polyuria.   Genitourinary: Negative.  Negative for decreased urine volume, difficulty urinating, dyspareunia, dysuria, enuresis, flank pain, " frequency, genital sores, hematuria, menstrual problem, pelvic pain, urgency, vaginal bleeding, vaginal discharge and vaginal pain.   Musculoskeletal: Positive for myalgias (pt has some decrease strength in quads). Negative for arthralgias, back pain, gait problem, joint swelling, neck pain and neck stiffness.   Skin: Negative.  Negative for color change, pallor, rash and wound.   Allergic/Immunologic: Negative.  Negative for environmental allergies, food allergies and immunocompromised state.   Neurological: Negative.  Negative for dizziness, tremors, seizures, syncope, facial asymmetry, speech difficulty, weakness, light-headedness, numbness and headaches.   Hematological: Negative.  Negative for adenopathy. Does not bruise/bleed easily.   Psychiatric/Behavioral: Negative.  Negative for agitation, behavioral problems, confusion, decreased concentration, dysphoric mood, hallucinations, self-injury, sleep disturbance and suicidal ideas. The patient is not nervous/anxious and is not hyperactive.      PHQ-2 Depression Screening  Little interest or pleasure in doing things? 0-->not at all   Feeling down, depressed, or hopeless? 0-->not at all   PHQ-2 Total Score 0       Anxiety FRACNESCA-7    Feeling nervous, anxious or on edge: Not at all  Not being able to stop or control worrying: Not at all  Worrying too much about different things: Not at all  Trouble Relaxing: Not at all  Being so restless that it is hard to sit still: Not at all  Becoming easily annoyed or irritable: Not at all  Feeling afraid as if something awful might happen: Not at all  FRANCESCA 7 Total Score: 0  If you checked any problems, how difficult have these problems made it for you to do your work, take care of things at home, or get along with other people: Not difficult at all         Objective   Physical Exam  Vitals and nursing note reviewed.   Constitutional:       General: She is not in acute distress.     Appearance: She is well-developed. She is not  diaphoretic.   HENT:      Head: Normocephalic and atraumatic.      Right Ear: Tympanic membrane, ear canal and external ear normal.      Left Ear: Tympanic membrane, ear canal and external ear normal.      Nose: Nose normal.      Mouth/Throat:      Lips: Pink.      Mouth: Mucous membranes are moist. Mucous membranes are not pale, not dry and not cyanotic. No injury.      Dentition: Normal dentition.      Tongue: No lesions.      Palate: No mass.      Pharynx: Uvula midline. No pharyngeal swelling, oropharyngeal exudate, posterior oropharyngeal erythema or uvula swelling.   Eyes:      General: Lids are normal. No scleral icterus.        Right eye: No foreign body, discharge or hordeolum.         Left eye: No foreign body, discharge or hordeolum.      Extraocular Movements:      Right eye: Normal extraocular motion and no nystagmus.      Left eye: Normal extraocular motion and no nystagmus.      Conjunctiva/sclera: Conjunctivae normal.      Right eye: Right conjunctiva is not injected. No chemosis, exudate or hemorrhage.     Left eye: Left conjunctiva is not injected. No chemosis, exudate or hemorrhage.     Pupils: Pupils are equal, round, and reactive to light.   Neck:      Thyroid: No thyroid mass or thyromegaly.      Vascular: No carotid bruit.      Trachea: Trachea normal. No tracheal deviation.   Cardiovascular:      Rate and Rhythm: Normal rate and regular rhythm.      Pulses:           Dorsalis pedis pulses are 2+ on the right side and 2+ on the left side.        Posterior tibial pulses are 2+ on the right side and 2+ on the left side.      Heart sounds: Normal heart sounds. No murmur heard.    No friction rub. No gallop.   Pulmonary:      Effort: Pulmonary effort is normal. No respiratory distress.      Breath sounds: Normal breath sounds. No decreased breath sounds, wheezing, rhonchi or rales.   Chest:      Chest wall: No tenderness. There is no dullness to percussion.   Breasts:     Jamie Score is 5.       Breasts are symmetrical.      Right: Normal. No swelling, bleeding, inverted nipple, mass, nipple discharge, skin change or tenderness.      Left: Normal. No swelling, bleeding, inverted nipple, mass, nipple discharge, skin change or tenderness.   Abdominal:      General: Bowel sounds are normal. There is no distension.      Palpations: Abdomen is soft. There is no hepatomegaly, splenomegaly or mass.      Tenderness: There is no abdominal tenderness. There is no guarding or rebound.      Hernia: No hernia is present.   Musculoskeletal:         General: No tenderness or deformity. Normal range of motion.      Cervical back: Normal range of motion and neck supple.      Right lower leg: No edema.      Left lower leg: No edema.   Lymphadenopathy:      Head:      Right side of head: No submandibular adenopathy.      Left side of head: No submandibular adenopathy.      Cervical: No cervical adenopathy.      Right cervical: No superficial, deep or posterior cervical adenopathy.     Left cervical: No superficial, deep or posterior cervical adenopathy.      Upper Body:      Right upper body: No supraclavicular, axillary or pectoral adenopathy.      Left upper body: No supraclavicular, axillary or pectoral adenopathy.   Skin:     General: Skin is warm and dry.      Findings: No rash.      Nails: There is no clubbing.   Neurological:      Mental Status: She is alert and oriented to person, place, and time.      Cranial Nerves: No cranial nerve deficit.      Sensory: No sensory deficit.      Coordination: Coordination normal.      Deep Tendon Reflexes: Reflexes are normal and symmetric.      Reflex Scores:       Bicep reflexes are 2+ on the right side and 2+ on the left side.       Brachioradialis reflexes are 2+ on the right side and 2+ on the left side.       Patellar reflexes are 2+ on the right side and 2+ on the left side.  Psychiatric:         Attention and Perception: Attention and perception normal.         Mood and  Affect: Mood normal.         Speech: Speech normal.         Behavior: Behavior normal.         Thought Content: Thought content normal.         Cognition and Memory: Cognition and memory normal.         Judgment: Judgment normal.         Assessment & Plan   Diagnoses and all orders for this visit:    1. Annual physical exam (Primary)  -     POCT urinalysis dipstick, automated  -     Comprehensive Metabolic Panel; Future  -     TSH; Future  -     Lipid Panel; Future  -     CBC & Differential; Future    2. Acquired hypothyroidism  -     levothyroxine (SYNTHROID, LEVOTHROID) 88 MCG tablet; Take 1 tablet by mouth Daily.  Dispense: 180 tablet; Refill: 1  -     TSH; Future  -     T4, Free; Future    3. Seasonal allergies  -     levocetirizine (XYZAL) 5 MG tablet; Take 1 tablet by mouth Daily.  Dispense: 90 tablet; Refill: 3    4. Vitamin D deficiency  -     Vitamin D,25-Hydroxy; Future      Discussed getting shingrix, Covid, HepA and Hep B after mammogram.     Please follow a low animal fat diet that is also low in sugar, low in junk food, low in sweet drinks and low in alcohol.  Please increase the amount of fiber in your diet as well as increasing your daily exercise, such as walking.           Current Outpatient Medications:   •  albuterol sulfate  (90 Base) MCG/ACT inhaler, Inhale 2 puffs Every 4 (Four) Hours As Needed for Wheezing., Disp: 1 inhaler, Rfl: 1  •  alendronate (Fosamax) 70 MG tablet, Take 1 tablet by mouth Every 7 (Seven) Days., Disp: 12 tablet, Rfl: 3  •  Calcium Carbonate 1500 (600 Ca) MG tablet, Take 1 tablet by mouth Daily., Disp: , Rfl:   •  Cholecalciferol (VITAMIN D3) 2000 units capsule, Take 1 capsule by mouth Daily., Disp: , Rfl:   •  ELDERBERRY PO, Take  by mouth., Disp: , Rfl:   •  levocetirizine (XYZAL) 5 MG tablet, Take 1 tablet by mouth Daily., Disp: 90 tablet, Rfl: 3  •  levothyroxine (SYNTHROID, LEVOTHROID) 88 MCG tablet, Take 1 tablet by mouth Daily., Disp: 180 tablet, Rfl: 1  •   multivitamin with minerals tablet tablet, Take 1 tablet by mouth Daily., Disp: , Rfl:   •  Unable to find, 1 each 1 (One) Time. Compounded estradiol vaginal cream 0.1 mg/gm 0.5 grams by vaginal route 2 X week., Disp: , Rfl:     Return in about 6 months (around 11/4/2023), or if symptoms worsen or fail to improve, for Recheck.     Recent Results (from the past 168 hour(s))   POCT urinalysis dipstick, automated    Collection Time: 05/04/23  9:23 AM    Specimen: Urine   Result Value Ref Range    Color Yellow Yellow, Straw, Dark Yellow, Niyah    Clarity, UA Clear Clear    Specific Gravity  1.015 1.005 - 1.030    pH, Urine 6.0 5.0 - 8.0    Leukocytes Negative Negative    Nitrite, UA Negative Negative    Protein, POC Negative Negative mg/dL    Glucose, UA Negative Negative mg/dL    Ketones, UA Negative Negative    Urobilinogen, UA Normal Normal, 0.2 E.U./dL    Bilirubin Negative Negative    Blood, UA Negative Negative    Lot Number [id number]     Expiration Date 10/25/2024

## 2023-05-05 ENCOUNTER — TELEPHONE (OUTPATIENT)
Dept: INTERNAL MEDICINE | Facility: CLINIC | Age: 58
End: 2023-05-05
Payer: COMMERCIAL

## 2023-05-05 NOTE — TELEPHONE ENCOUNTER
----- Message from Anh GAMINO MD sent at 5/4/2023  7:25 PM EDT -----  Send copy of lab to Specialist Dr. Alonso in rheumatology and Dr. Lion in endocrine

## 2023-07-07 ENCOUNTER — TELEPHONE (OUTPATIENT)
Dept: INTERNAL MEDICINE | Facility: CLINIC | Age: 58
End: 2023-07-07

## 2023-07-07 NOTE — TELEPHONE ENCOUNTER
Caller: Rosannachuy Aura    Relationship: Self    Best call back number: [phone]     What is the medical concern/diagnosis: MUSCLES AROUND KNEES ARE HURTING AND WEAK. IT CONSTANTLY HURTS.    What specialty or service is being requested: PHYSICAL THERAPY    What is the provider, practice or medical service name: [facility]     What is the office location: [address] [place] [address]    What is the office phone number: [phone]    Any additional details: PLEASE CALL PATIENT TO LET HER KNOW IF SHE NEEDS TO COME IN FOR AN APPOINTMENT BEFORE THIS CAN BE SENT.

## 2023-07-10 NOTE — TELEPHONE ENCOUNTER
PN on VM that if this is something we have not seen her for recently, she will need to call office to schedule an appointment

## 2023-07-27 ENCOUNTER — OFFICE VISIT (OUTPATIENT)
Dept: INTERNAL MEDICINE | Facility: CLINIC | Age: 58
End: 2023-07-27
Payer: COMMERCIAL

## 2023-07-27 ENCOUNTER — HOSPITAL ENCOUNTER (OUTPATIENT)
Dept: GENERAL RADIOLOGY | Facility: HOSPITAL | Age: 58
Discharge: HOME | End: 2023-07-27
Admitting: FAMILY MEDICINE
Payer: COMMERCIAL

## 2023-07-27 VITALS
BODY MASS INDEX: 27.28 KG/M2 | WEIGHT: 180 LBS | TEMPERATURE: 98 F | SYSTOLIC BLOOD PRESSURE: 110 MMHG | OXYGEN SATURATION: 97 % | DIASTOLIC BLOOD PRESSURE: 70 MMHG | HEIGHT: 68 IN | HEART RATE: 77 BPM

## 2023-07-27 DIAGNOSIS — M25.562 CHRONIC PAIN OF BOTH KNEES: Primary | ICD-10-CM

## 2023-07-27 DIAGNOSIS — M25.562 CHRONIC PAIN OF BOTH KNEES: ICD-10-CM

## 2023-07-27 DIAGNOSIS — G89.29 CHRONIC PAIN OF BOTH KNEES: ICD-10-CM

## 2023-07-27 DIAGNOSIS — M25.561 CHRONIC PAIN OF BOTH KNEES: ICD-10-CM

## 2023-07-27 DIAGNOSIS — M25.561 CHRONIC PAIN OF BOTH KNEES: Primary | ICD-10-CM

## 2023-07-27 DIAGNOSIS — G89.29 CHRONIC PAIN OF BOTH KNEES: Primary | ICD-10-CM

## 2023-07-27 PROCEDURE — 99213 OFFICE O/P EST LOW 20 MIN: CPT | Performed by: FAMILY MEDICINE

## 2023-07-27 PROCEDURE — 73562 X-RAY EXAM OF KNEE 3: CPT

## 2023-07-27 RX ORDER — ESTRADIOL 0.1 MG/G
0.5 CREAM VAGINAL 2 TIMES WEEKLY
COMMUNITY

## 2023-07-27 NOTE — PROGRESS NOTES
"Subjective   Carlota Brian is a 57 y.o. female.     Chief Complaint   Patient presents with    Knee Pain     BILATERAL KNEE PAIN getting worse.  Worse when squatting       Visit Vitals  /70 (BP Location: Left arm, Patient Position: Sitting, Cuff Size: Adult)   Pulse 77   Temp 98 °F (36.7 °C)   Ht 173.5 cm (68.3\")   Wt 81.6 kg (180 lb)   SpO2 97%   BMI 27.13 kg/m²       Wt Readings from Last 3 Encounters:   07/27/23 81.6 kg (180 lb)   05/04/23 79.4 kg (175 lb)   04/06/23 80.1 kg (176 lb 9.6 oz)       Knee Pain   The incident occurred more than 1 week ago. There was no injury mechanism. The pain is present in the right knee and left knee (worse on the right). The pain is at a severity of 4/10. The pain is moderate. The pain has been Fluctuating since onset. Associated symptoms include an inability to bear weight and muscle weakness. Pertinent negatives include no loss of motion, loss of sensation, numbness or tingling. She reports no foreign bodies present. Exacerbated by: squatting.      The following portions of the patient's history were reviewed and updated as appropriate: allergies, current medications, past family history, past medical history, past social history, past surgical history, and problem list.    Past Medical History:   Diagnosis Date    Basal cell carcinoma (BCC) of forehead 2008    left removed by Mohs    Family history of aortic aneurysm     Dad and P uncles and P aunt:  thoracic ascending    Hypothyroidism     Menopause     Plantar fasciitis     Seasonal allergies     Type B blood, Rh positive 10/31/2019    Vitamin D deficiency 05/10/2018      Past Surgical History:   Procedure Laterality Date    COLONOSCOPY      age 48 in [place]    EAR TUBES Bilateral     MOHS SURGERY Left 2008    ft forehead BCCA      Family History   Problem Relation Age of Onset    Stroke Mother         brain bleed    Thyroid disease Mother     Coronary artery disease Mother     Hypertension Mother     " "Hyperlipidemia Mother     Scleroderma Mother     Dumont's esophagus Mother     Peripheral vascular disease Mother     Hepatitis Mother         ? hep C from transfusion    Fibromyalgia Mother     Aneurysm Father     Thyroid disease Sister     Seizures Sister     Other Brother         repair of mitral valve 2 cord ruptured    Autism Son     Heart attack Paternal Aunt     Aneurysm Paternal Aunt     Dementia Paternal Aunt     Lung cancer Paternal Aunt     Heart attack Paternal Uncle     Aneurysm Paternal Uncle     Heart attack Paternal Uncle     No Known Problems Paternal Grandmother     Heart attack Paternal Grandfather       Social History     Socioeconomic History    Marital status:    Tobacco Use    Smoking status: Never    Smokeless tobacco: Never   Vaping Use    Vaping Use: Never used   Substance and Sexual Activity    Alcohol use: Yes     Comment: occ    Drug use: No    Sexual activity: Yes     Partners: Male     Birth control/protection: Post-menopausal     Comment:       Allergies   Allergen Reactions    Blue Dyes (Parenteral) Anaphylaxis    Penicillins Other (See Comments)     Patient states that \"cillins\" do not work for her.    Azithromycin Rash     Zpack breaks out in rash       Review of Systems   Constitutional:  Positive for fatigue.   Musculoskeletal:  Positive for arthralgias (bilateral knees and right shoulder).   Neurological:  Negative for tingling and numbness.   Psychiatric/Behavioral:  Positive for sleep disturbance (sleeping on her back, because of right shoulder pain).      Objective   Physical Exam  Vitals and nursing note reviewed.   Constitutional:       Appearance: She is well-developed.   HENT:      Head: Normocephalic.      Right Ear: External ear normal.      Left Ear: External ear normal.      Nose: Nose normal.   Eyes:      General: Lids are normal.      Conjunctiva/sclera: Conjunctivae normal.      Pupils: Pupils are equal, round, and reactive to light.   Neck:      " Thyroid: No thyroid mass or thyromegaly.      Vascular: No carotid bruit.      Trachea: Trachea normal.   Cardiovascular:      Rate and Rhythm: Normal rate and regular rhythm.      Heart sounds: No murmur heard.  Pulmonary:      Effort: Pulmonary effort is normal. No respiratory distress.      Breath sounds: Normal breath sounds. No decreased breath sounds, wheezing, rhonchi or rales.   Chest:      Chest wall: No tenderness.   Abdominal:      General: Bowel sounds are normal.      Palpations: Abdomen is soft.      Tenderness: There is no abdominal tenderness.   Musculoskeletal:         General: Normal range of motion.      Cervical back: Normal range of motion and neck supple.      Right knee: Crepitus present. No swelling. Tenderness present. No LCL laxity, MCL laxity, ACL laxity or PCL laxity.      Left knee: Crepitus present. No swelling. Tenderness present. No LCL laxity, MCL laxity, ACL laxity or PCL laxity.  Skin:     General: Skin is warm and dry.   Neurological:      Mental Status: She is alert and oriented to person, place, and time.   Psychiatric:         Behavior: Behavior normal.       Assessment & Plan   Problems Addressed this Visit    None  Visit Diagnoses       Chronic pain of both knees    -  Primary    Relevant Orders    XR Knee 3 View Bilateral    Ambulatory Referral to Physical Therapy Evaluate and treat (Completed)          Diagnoses         Codes Comments    Chronic pain of both knees    -  Primary ICD-10-CM: M25.561, M25.562, G89.29  ICD-9-CM: 719.46, 338.29             Pt declined oral medication for knee pain.  Use tylenol or NSAIDs  Discussed Covid booster and shingrix.      Check for sleep apnea. Which could cause fatigue.  Discussed checking phone patricia for sleep apnea first.    Current Outpatient Medications:     albuterol sulfate  (90 Base) MCG/ACT inhaler, Inhale 2 puffs Every 4 (Four) Hours As Needed for Wheezing., Disp: 1 inhaler, Rfl: 1    alendronate (Fosamax) 70 MG tablet,  Take 1 tablet by mouth Every 7 (Seven) Days., Disp: 12 tablet, Rfl: 3    Calcium Carbonate 1500 (600 Ca) MG tablet, Take 1 tablet by mouth Daily., Disp: , Rfl:     Cholecalciferol (VITAMIN D3) 2000 units capsule, Take 1 capsule by mouth Daily., Disp: , Rfl:     ELDERBERRY PO, Take  by mouth., Disp: , Rfl:     levocetirizine (XYZAL) 5 MG tablet, Take 1 tablet by mouth Daily., Disp: 90 tablet, Rfl: 3    levothyroxine (SYNTHROID, LEVOTHROID) 88 MCG tablet, Take 1 tablet by mouth Daily., Disp: 180 tablet, Rfl: 1    multivitamin with minerals tablet tablet, Take 1 tablet by mouth Daily., Disp: , Rfl:     Unable to find, 1 each 1 (One) Time. Compounded estradiol vaginal cream 0.1 mg/gm 0.5 grams by vaginal route 2 X week., Disp: , Rfl:     estradiol (ESTRACE) 0.1 MG/GM vaginal cream, Insert 0.5 g into the vagina 2 (Two) Times a Week., Disp: , Rfl:     Return if symptoms worsen or fail to improve, for Recheck.

## 2023-08-31 ENCOUNTER — TREATMENT (OUTPATIENT)
Dept: PHYSICAL THERAPY | Facility: CLINIC | Age: 58
End: 2023-08-31
Payer: COMMERCIAL

## 2023-08-31 DIAGNOSIS — M25.561 CHRONIC PAIN OF BOTH KNEES: Primary | ICD-10-CM

## 2023-08-31 DIAGNOSIS — M25.562 CHRONIC PAIN OF BOTH KNEES: Primary | ICD-10-CM

## 2023-08-31 DIAGNOSIS — G89.29 CHRONIC PAIN OF BOTH KNEES: Primary | ICD-10-CM

## 2023-08-31 PROCEDURE — 97110 THERAPEUTIC EXERCISES: CPT | Performed by: PHYSICAL THERAPIST

## 2023-08-31 PROCEDURE — 97162 PT EVAL MOD COMPLEX 30 MIN: CPT | Performed by: PHYSICAL THERAPIST

## 2023-08-31 NOTE — PROGRESS NOTES
Physical Therapy Initial Evaluation and Plan of Care  [address] [facility] [address]  [place] [address]    Patient: Carlota Brian   : 1965  Diagnosis/ICD-10 Code:  No primary diagnosis found.  Referring practitioner: Anh GAMINO MD  Date of Initial Visit: 2023  Today's Date: 2023  Patient seen for Visit count could not be calculated. Make sure you are using a visit which is associated with an episode. session         Visit Diagnoses:  No diagnosis found.      Subjective Questionnaire: LEFS: 61      Subjective Evaluation    History of Present Illness  Mechanism of injury: Pt is a 57 year old female presenting to the clinic with bilateral knee pain. Her right feels worse. Every time she tries to exercise she ends up having knee pain. She works on concrete. She does wear supportive shoes. She has a hard time with getting up off of the floor. She avoids squatting. Getting in and out of the tub is also difficult. She walks [id number] steps a day.       Patient Occupation: [employer] Quality of life: excellent    Pain  Current pain ratin  At worst pain ratin  Quality: dull ache and discomfort  Aggravating factors: stairs, squatting and repetitive movement  Progression: worsening    Patient Goals  Patient goals for therapy: decreased pain, increased motion, increased strength, independence with ADLs/IADLs and return to sport/leisure activities           Objective          Tenderness   Left Knee   Tenderness in the medial joint line and superior patella.     Right Knee   Tenderness in the medial joint line and superior patella.     Active Range of Motion   Left Knee   Flexion: 140 degrees   Extension: 0 degrees     Right Knee   Flexion: 140 degrees   Extension: 0 degrees     Strength/Myotome Testing     Left Hip   Planes of Motion   Flexion: 4+  Abduction: 4-    Right Hip   Planes of Motion   Flexion: 4+  Abduction: 4-    Left Knee   Flexion: 5  Extension: 5    Right Knee   Flexion:  5  Extension: 5    Additional Strength Details  SLR position: 3+/5 on right, 4-/5 on left.    Tests     Left Knee   Negative anterior Lachman, posterior Lachman, valgus stress test at 0 degrees and varus stress test at 30 degrees.     Right Knee   Negative anterior Lachman, posterior Lachman, valgus stress test at 0 degrees and varus stress test at 0 degrees.     Functional Assessment   Squat   Pain.         Assessment & Plan       Assessment  Impairments: abnormal gait, abnormal or restricted ROM, activity intolerance, impaired balance, impaired physical strength, lacks appropriate home exercise program and pain with function   Functional limitations: walking, uncomfortable because of pain and unable to perform repetitive tasks   Assessment details: Pt is a 57 year old female presenting to the clinic with bilateral knee pain, right is worse than left. She demonstrates decreased strength and tenderness of the superior patella and medial joint line. Her impairments are limiting her ability to squat and get off of the floor without pain. Pt would benefit from skilled PT to address her impairments so she can reach her long term goals.   Prognosis: good    Goals  Plan Goals: SHORT TERM GOALS:  2 weeks       1. Pt independent with HEP  2. Pt to demonstrate melissa hip strength 4/5 or greater to improve stability with ambulation  3. Pt to report worst pain at 1/10 with squatting    LONG TERM GOALS:   6 weeks  1. Pt to demonstrate ability to perform full functional squat with good form and control of the knees and without increasing pain  2. Pt to demonstrate melissa hip strength to 4+/5 or greater to improve safety with ambulation on uneven surfaces  3. Pt to demonstrate ability to perform step up/down 8 inch step x10 safely and without pain in the bilateral knee      Plan  Therapy options: will be seen for skilled therapy services  Planned modality interventions: cryotherapy, dry needling, electrical stimulation/Russian  stimulation, high voltage pulsed current (pain management), TENS, thermotherapy (hydrocollator packs) and ultrasound  Planned therapy interventions: manual therapy, neuromuscular re-education, postural training, soft tissue mobilization, spinal/joint mobilization, strengthening, stretching, therapeutic activities, home exercise program, gait training, body mechanics training and balance/weight-bearing training  Duration in visits: 1  Duration in weeks: 12  Treatment plan discussed with: patient  Plan details: Discussed 1x every 3 weeks until she is independent with her HEP      History # of Personal Factors and/or Comorbidities: MODERATE (1-2)  Examination of Body System(s): # of elements: MODERATE (3)  Clinical Presentation: STABLE   Clinical Decision Making: MODERATE      Timed:         Manual Therapy:         mins  40274;     Therapeutic Exercise:    14     mins  89051;     Neuromuscular Molina:        mins  84317;    Therapeutic Activity:          mins  85819;     Gait Training:           mins  76783;     Ultrasound:          mins  30781;    Ionto                                   mins   95452  Self Care                            mins   82186  Canalith Repos         mins 75743      Un-Timed:  Electrical Stimulation:         mins  70075 ( );  Dry Needling          mins self-pay  Traction          mins 17334  Low Eval          Mins  80333  Mod Eval     30     Mins  90770  High Eval                            Mins  47692        Timed Treatment:   14   mins   Total Treatment:     44   mins          PT: Nurys Wood PT   License Number: [id number]  Electronically signed by Nurys Wood PT, 08/31/23, 10:06 AM EDT    Certification Period: 8/31/2023 thru 11/28/2023  I certify that the therapy services are furnished while this patient is under my care.  The services outlined above are required by this patient, and will be reviewed every 90 days.         Physician  Signature:__________________________________________________    PHYSICIAN: Anh Ayala MD  NPI: [id number]                                      DATE:      Please sign and return via fax to [email or web address] . Thank you, [facility].

## 2023-09-21 ENCOUNTER — TREATMENT (OUTPATIENT)
Dept: PHYSICAL THERAPY | Facility: CLINIC | Age: 58
End: 2023-09-21
Payer: COMMERCIAL

## 2023-09-21 DIAGNOSIS — G89.29 CHRONIC PAIN OF BOTH KNEES: Primary | ICD-10-CM

## 2023-09-21 DIAGNOSIS — M25.562 CHRONIC PAIN OF BOTH KNEES: Primary | ICD-10-CM

## 2023-09-21 DIAGNOSIS — M25.561 CHRONIC PAIN OF BOTH KNEES: Primary | ICD-10-CM

## 2023-09-21 PROCEDURE — 97530 THERAPEUTIC ACTIVITIES: CPT | Performed by: PHYSICAL THERAPIST

## 2023-09-21 PROCEDURE — 97110 THERAPEUTIC EXERCISES: CPT | Performed by: PHYSICAL THERAPIST

## 2023-09-21 NOTE — PROGRESS NOTES
Physical Therapy Daily Progress Note    Subjective   Carlota Brian reports: she is doing fine. She still feels like her knees are achey.      Objective   See Exercise, Manual, and Modality Logs for complete treatment.       Assessment/Plan  Pt tolerated treatment well. She required cues to push the back of her knee down in order to better activate the quad with straight leg raises. She was educated on an updated HEP. Pt would benefit from continued skilled PT.     Progress per Plan of Care           Manual Therapy:         mins  82520;  Therapeutic Exercise:    24     mins  93372;     Neuromuscular Molina:        mins  39405;    Therapeutic Activity:     23     mins  38028;     Gait Training:           mins  96733;     Ultrasound:          mins  59637;    Electrical Stimulation:         mins  97968 ( );  E-Stim Attended:         mins  12125  Iontophoresis          mins 59852   Traction        mins  14623  Fluidotherapy          mins  92441  Dry Needling          mins self-pay - No Charge  Paraffin          mins  58643    Timed Treatment:   47   mins   Total Treatment:     47   mins    Nurys Wood, PT, DPT  Physical Therapist

## 2023-10-05 ENCOUNTER — OFFICE VISIT (OUTPATIENT)
Dept: INTERNAL MEDICINE | Facility: CLINIC | Age: 58
End: 2023-10-05
Payer: COMMERCIAL

## 2023-10-05 VITALS — OXYGEN SATURATION: 99 % | TEMPERATURE: 97.1 F | HEART RATE: 77 BPM

## 2023-10-05 DIAGNOSIS — J06.9 UPPER RESPIRATORY TRACT INFECTION, UNSPECIFIED TYPE: Primary | ICD-10-CM

## 2023-10-05 DIAGNOSIS — R05.9 COUGH, UNSPECIFIED TYPE: ICD-10-CM

## 2023-10-05 DIAGNOSIS — B37.9 YEAST INFECTION: ICD-10-CM

## 2023-10-05 LAB
EXPIRATION DATE: NORMAL
EXPIRATION DATE: NORMAL
FLUAV AG NPH QL: NEGATIVE
FLUBV AG NPH QL: NEGATIVE
INTERNAL CONTROL: NORMAL
INTERNAL CONTROL: NORMAL
Lab: NORMAL
Lab: NORMAL
SARS-COV-2 AG UPPER RESP QL IA.RAPID: NOT DETECTED

## 2023-10-05 RX ORDER — FLUCONAZOLE 150 MG/1
150 TABLET ORAL ONCE AS NEEDED
Qty: 1 TABLET | Refills: 0 | Status: SHIPPED | OUTPATIENT
Start: 2023-10-05

## 2023-10-05 RX ORDER — CEFDINIR 300 MG/1
300 CAPSULE ORAL 2 TIMES DAILY
Qty: 14 CAPSULE | Refills: 0 | Status: SHIPPED | OUTPATIENT
Start: 2023-10-05 | End: 2023-10-12

## 2023-10-05 RX ORDER — ALBUTEROL SULFATE 90 UG/1
2 AEROSOL, METERED RESPIRATORY (INHALATION) EVERY 4 HOURS PRN
Qty: 18 G | Refills: 0 | Status: SHIPPED | OUTPATIENT
Start: 2023-10-05

## 2023-10-05 NOTE — PROGRESS NOTES
Office Note     Name: Carlota Brian    : 1965     MRN: [id number]     Chief Complaint  Cough, Nasal Congestion, Med Refill (Inhaler/), and Headache    Subjective     History of Present Illness:  Carlota Brian is a 58 y.o. female who presents today for     evaluation of symptoms of a URI. Symptoms include dry cough, nasal blockage, post nasal drip, sinus and nasal congestion, and sore throat. Onset of symptoms was a few days ago, unchanged since that time. Associated symptoms include achiness, congestion, nasal congestion, post nasal drip, and productive cough with  yellow colored sputum.  She is drinking plenty of fluids. Evaluation to date: none. Treatment to date: none  The following portions of the patient's history were reviewed and updated as appropriate: allergies, current medications, past family history, past medical history, past social history, past surgical history, and problem list.        Review of Systems:   Review of Systems   All other systems reviewed and are negative.      Past Medical History:   Past Medical History:   Diagnosis Date    Basal cell carcinoma (BCC) of forehead     left removed by Mohs    Family history of aortic aneurysm     Dad and P uncles and P aunt:  thoracic ascending    Hypothyroidism     Menopause     Plantar fasciitis     Seasonal allergies     Type B blood, Rh positive 10/31/2019    Vitamin D deficiency 05/10/2018       Past Surgical History:   Past Surgical History:   Procedure Laterality Date    COLONOSCOPY      age 48 in [place]    EAR TUBES Bilateral     MOHS SURGERY Left     ft forehead BCCA       Family History:   Family History   Problem Relation Age of Onset    Stroke Mother         brain bleed    Thyroid disease Mother     Coronary artery disease Mother     Hypertension Mother     Hyperlipidemia Mother     Scleroderma Mother     Dumont's esophagus Mother     Peripheral vascular disease Mother     Hepatitis Mother         ? hep C from  transfusion    Fibromyalgia Mother     Aneurysm Father     Thyroid disease Sister     Seizures Sister     Other Brother         repair of mitral valve 2 cord ruptured    Autism Son     Heart attack Paternal Aunt     Aneurysm Paternal Aunt     Dementia Paternal Aunt     Lung cancer Paternal Aunt     Heart attack Paternal Uncle     Aneurysm Paternal Uncle     Heart attack Paternal Uncle     No Known Problems Paternal Grandmother     Heart attack Paternal Grandfather        Social History:   Social History     Socioeconomic History    Marital status:    Tobacco Use    Smoking status: Never    Smokeless tobacco: Never   Vaping Use    Vaping Use: Never used   Substance and Sexual Activity    Alcohol use: Yes     Comment: occ    Drug use: No    Sexual activity: Yes     Partners: Male     Birth control/protection: Post-menopausal     Comment:        Immunizations:   Immunization History   Administered Date(s) Administered    COVID-19 (PFIZER) Purple Cap Monovalent 03/19/2021, 04/13/2021, 11/30/2021    Hep A / Hep B 10/20/2022    MMR 08/25/2022    Tdap 05/10/2018    Typhoid, Unspecified 11/01/2022    Yellow Fever 09/01/2022        Medications:     Current Outpatient Medications:     albuterol sulfate  (90 Base) MCG/ACT inhaler, Inhale 2 puffs Every 4 (Four) Hours As Needed for Wheezing., Disp: 18 g, Rfl: 0    alendronate (Fosamax) 70 MG tablet, Take 1 tablet by mouth Every 7 (Seven) Days., Disp: 12 tablet, Rfl: 3    Calcium Carbonate 1500 (600 Ca) MG tablet, Take 1 tablet by mouth Daily., Disp: , Rfl:     Cholecalciferol (VITAMIN D3) 2000 units capsule, Take 1 capsule by mouth Daily., Disp: , Rfl:     ELDERBERRY PO, Take  by mouth., Disp: , Rfl:     estradiol (ESTRACE) 0.1 MG/GM vaginal cream, Insert 0.5 g into the vagina 2 (Two) Times a Week., Disp: , Rfl:     levocetirizine (XYZAL) 5 MG tablet, Take 1 tablet by mouth Daily., Disp: 90 tablet, Rfl: 3    levothyroxine (SYNTHROID, LEVOTHROID) 88 MCG  "tablet, Take 1 tablet by mouth Daily., Disp: 180 tablet, Rfl: 1    multivitamin with minerals tablet tablet, Take 1 tablet by mouth Daily., Disp: , Rfl:     fluconazole (Diflucan) 150 MG tablet, Take 1 tablet by mouth 1 (One) Time As Needed (yeast infection) for up to 1 dose., Disp: 1 tablet, Rfl: 0    Unable to find, 1 each 1 (One) Time. Compounded estradiol vaginal cream 0.1 mg/gm 0.5 grams by vaginal route 2 X week. (Patient not taking: Reported on 10/5/2023), Disp: , Rfl:     Allergies:   Allergies   Allergen Reactions    Blue Dyes (Parenteral) Anaphylaxis    Penicillins Other (See Comments)     Patient states that \"cillins\" do not work for her.    Azithromycin Rash     Zpack breaks out in rash       Objective     Vital Signs  Pulse 77   Temp 97.1 øF (36.2 øC) (Temporal)   SpO2 99%   Estimated body mass index is 27.13 kg/mý as calculated from the following:    Height as of 7/27/23: 173.5 cm (68.3\").    Weight as of 7/27/23: 81.6 kg (180 lb).            Physical Exam  Constitutional:       General: She is not in acute distress.     Appearance: Normal appearance. She is not ill-appearing or toxic-appearing.   HENT:      Nose: Congestion present.      Mouth/Throat:      Mouth: Mucous membranes are moist.      Pharynx: Posterior oropharyngeal erythema present.   Cardiovascular:      Rate and Rhythm: Normal rate and regular rhythm.      Pulses: Normal pulses.      Heart sounds: Normal heart sounds.   Pulmonary:      Effort: Pulmonary effort is normal.      Breath sounds: Normal breath sounds.   Neurological:      Mental Status: She is alert.          Result Review :                Results for orders placed or performed in visit on 10/05/23   POCT VERITOR SARS-CoV-2 Antigen    Specimen: Nasopharynx; Swab   Result Value Ref Range    SARS Antigen Not Detected Not Detected, Presumptive Negative    Internal Control Passed Passed    Lot Number [id number]     Expiration Date 4/2/24    POCT Influenza A/B    Specimen: Swab "   Result Value Ref Range    Rapid Influenza A Ag Negative Negative    Rapid Influenza B Ag Negative Negative    Internal Control Passed Passed    Lot Number [id number]     Expiration Date 12/1/25          Assessment and Plan     1. Upper respiratory tract infection, unspecified type    - albuterol sulfate  (90 Base) MCG/ACT inhaler; Inhale 2 puffs Every 4 (Four) Hours As Needed for Wheezing.  Dispense: 18 g; Refill: 0  - cefdinir (OMNICEF) 300 MG capsule; Take 1 capsule by mouth 2 (Two) Times a Day for 7 days.  Dispense: 14 capsule; Refill: 0    2. Cough, unspecified type    - POCT VERITOR SARS-CoV-2 Antigen  - POCT Influenza A/B    3. Yeast infection  As needed, when patient takes antibiotics, she is prone to develop yeast infection  - fluconazole (Diflucan) 150 MG tablet; Take 1 tablet by mouth 1 (One) Time As Needed (yeast infection) for up to 1 dose.  Dispense: 1 tablet; Refill: 0       Follow Up  No follow-ups on file.    John Levy MD  [facility] [address]  [facility]  [id number]  [address] [address]  [phone]

## 2023-10-12 ENCOUNTER — TREATMENT (OUTPATIENT)
Dept: PHYSICAL THERAPY | Facility: CLINIC | Age: 58
End: 2023-10-12
Payer: COMMERCIAL

## 2023-10-12 DIAGNOSIS — M25.561 CHRONIC PAIN OF BOTH KNEES: Primary | ICD-10-CM

## 2023-10-12 DIAGNOSIS — G89.29 CHRONIC PAIN OF BOTH KNEES: Primary | ICD-10-CM

## 2023-10-12 DIAGNOSIS — M25.562 CHRONIC PAIN OF BOTH KNEES: Primary | ICD-10-CM

## 2023-10-12 PROCEDURE — 97530 THERAPEUTIC ACTIVITIES: CPT | Performed by: PHYSICAL THERAPIST

## 2023-10-12 PROCEDURE — 97110 THERAPEUTIC EXERCISES: CPT | Performed by: PHYSICAL THERAPIST

## 2023-10-12 NOTE — PROGRESS NOTES
Physical Therapy Daily Progress Note    Subjective   Carlota Brian reports: she is doing much better and is no longer having knee pain. She would like a couple of exercises she can do for core strength.    Objective          Strength/Myotome Testing     Left Hip   Planes of Motion   Abduction: 4+    Right Hip   Planes of Motion   Abduction: 4+    Additional Strength Details  SLR position: 4+/5 bilaterally      See Exercise, Manual, and Modality Logs for complete treatment.       Assessment/Plan  Pt has tolerated treatment well and has met all goals that were set for her initially. She was educated on exercises she can perform at home for core strength. She was encouraged to continue her HEP everyday. At this time, she is safe to be discharged.    Other  DC         Manual Therapy:         mins  85145;  Therapeutic Exercise:    26     mins  01832;     Neuromuscular Molina:        mins  19572;    Therapeutic Activity:     15     mins  67700;     Gait Training:           mins  50106;     Ultrasound:          mins  43786;    Electrical Stimulation:         mins  11177 ( );  E-Stim Attended:         mins  79383  Iontophoresis          mins 48448   Traction          mins  22409  Fluidotherapy          mins  41346  Dry Needling          mins self-pay - No Charge  Paraffin          mins  27893    Timed Treatment:   41   mins   Total Treatment:     41   mins    Nurys Wood, PT, DPT  Physical Therapist

## 2023-11-02 ENCOUNTER — LAB (OUTPATIENT)
Dept: INTERNAL MEDICINE | Facility: CLINIC | Age: 58
End: 2023-11-02
Payer: COMMERCIAL

## 2023-11-02 ENCOUNTER — OFFICE VISIT (OUTPATIENT)
Dept: INTERNAL MEDICINE | Facility: CLINIC | Age: 58
End: 2023-11-02
Payer: COMMERCIAL

## 2023-11-02 VITALS
DIASTOLIC BLOOD PRESSURE: 82 MMHG | SYSTOLIC BLOOD PRESSURE: 118 MMHG | BODY MASS INDEX: 26.22 KG/M2 | HEIGHT: 68 IN | OXYGEN SATURATION: 98 % | WEIGHT: 173 LBS | TEMPERATURE: 97.7 F | HEART RATE: 85 BPM

## 2023-11-02 DIAGNOSIS — E03.9 ACQUIRED HYPOTHYROIDISM: ICD-10-CM

## 2023-11-02 DIAGNOSIS — E07.89 THYROID PAIN: Primary | ICD-10-CM

## 2023-11-02 DIAGNOSIS — E55.9 VITAMIN D DEFICIENCY: ICD-10-CM

## 2023-11-02 DIAGNOSIS — E78.89 LIPIDS ABNORMAL: ICD-10-CM

## 2023-11-02 DIAGNOSIS — E07.89 THYROID PAIN: ICD-10-CM

## 2023-11-02 LAB
25(OH)D3 SERPL-MCNC: 43.3 NG/ML (ref 30–100)
ALBUMIN SERPL-MCNC: 4.7 G/DL (ref 3.5–5.2)
ALBUMIN/GLOB SERPL: 1.6 G/DL
ALP SERPL-CCNC: 72 U/L (ref 39–117)
ALT SERPL W P-5'-P-CCNC: 17 U/L (ref 1–33)
ANION GAP SERPL CALCULATED.3IONS-SCNC: 10.3 MMOL/L (ref 5–15)
AST SERPL-CCNC: 23 U/L (ref 1–32)
BASOPHILS # BLD AUTO: 0.03 10*3/MM3 (ref 0–0.2)
BASOPHILS NFR BLD AUTO: 0.4 % (ref 0–1.5)
BILIRUB SERPL-MCNC: 0.4 MG/DL (ref 0–1.2)
BUN SERPL-MCNC: 16 MG/DL (ref 8–23)
BUN/CREAT SERPL: 15.7 (ref 7–25)
CALCIUM SPEC-SCNC: 9.9 MG/DL (ref 8.6–10.5)
CHLORIDE SERPL-SCNC: 102 MMOL/L (ref 98–107)
CHOLEST SERPL-MCNC: 227 MG/DL (ref 0–200)
CO2 SERPL-SCNC: 26.7 MMOL/L (ref 22–29)
CREAT SERPL-MCNC: 1.02 MG/DL (ref 0.57–1)
CRP SERPL-MCNC: <0.3 MG/DL (ref 0–0.5)
DEPRECATED RDW RBC AUTO: 39.7 FL (ref 37–54)
EGFRCR SERPLBLD CKD-EPI 2021: 63.1 ML/MIN/1.73
EOSINOPHIL # BLD AUTO: 0.17 10*3/MM3 (ref 0–0.4)
EOSINOPHIL NFR BLD AUTO: 2.2 % (ref 0.3–6.2)
ERYTHROCYTE [DISTWIDTH] IN BLOOD BY AUTOMATED COUNT: 12.4 % (ref 12.3–15.4)
ERYTHROCYTE [SEDIMENTATION RATE] IN BLOOD: 13 MM/HR (ref 0–30)
GLOBULIN UR ELPH-MCNC: 3 GM/DL
GLUCOSE SERPL-MCNC: 82 MG/DL (ref 65–99)
HCT VFR BLD AUTO: 43 % (ref 34–46.6)
HDLC SERPL-MCNC: 83 MG/DL (ref 40–60)
HGB BLD-MCNC: 14.6 G/DL (ref 12–15.9)
IMM GRANULOCYTES # BLD AUTO: 0.02 10*3/MM3 (ref 0–0.05)
IMM GRANULOCYTES NFR BLD AUTO: 0.3 % (ref 0–0.5)
IRON 24H UR-MRATE: 79 MCG/DL (ref 37–145)
IRON SATN MFR SERPL: 21 % (ref 20–50)
LDLC SERPL CALC-MCNC: 135 MG/DL (ref 0–100)
LDLC/HDLC SERPL: 1.61 {RATIO}
LYMPHOCYTES # BLD AUTO: 3.37 10*3/MM3 (ref 0.7–3.1)
LYMPHOCYTES NFR BLD AUTO: 43.8 % (ref 19.6–45.3)
MCH RBC QN AUTO: 29.7 PG (ref 26.6–33)
MCHC RBC AUTO-ENTMCNC: 34 G/DL (ref 31.5–35.7)
MCV RBC AUTO: 87.6 FL (ref 79–97)
MONOCYTES # BLD AUTO: 0.51 10*3/MM3 (ref 0.1–0.9)
MONOCYTES NFR BLD AUTO: 6.6 % (ref 5–12)
NEUTROPHILS NFR BLD AUTO: 3.6 10*3/MM3 (ref 1.7–7)
NEUTROPHILS NFR BLD AUTO: 46.7 % (ref 42.7–76)
NRBC BLD AUTO-RTO: 0 /100 WBC (ref 0–0.2)
PLATELET # BLD AUTO: 317 10*3/MM3 (ref 140–450)
PMV BLD AUTO: 11 FL (ref 6–12)
POTASSIUM SERPL-SCNC: 4.3 MMOL/L (ref 3.5–5.2)
PROT SERPL-MCNC: 7.7 G/DL (ref 6–8.5)
RBC # BLD AUTO: 4.91 10*6/MM3 (ref 3.77–5.28)
SODIUM SERPL-SCNC: 139 MMOL/L (ref 136–145)
T3FREE SERPL-MCNC: 2.63 PG/ML (ref 2–4.4)
T4 FREE SERPL-MCNC: 1.56 NG/DL (ref 0.93–1.7)
TIBC SERPL-MCNC: 377 MCG/DL (ref 298–536)
TRANSFERRIN SERPL-MCNC: 253 MG/DL (ref 200–360)
TRIGL SERPL-MCNC: 50 MG/DL (ref 0–150)
TSH SERPL DL<=0.05 MIU/L-ACNC: 1.34 UIU/ML (ref 0.27–4.2)
VLDLC SERPL-MCNC: 9 MG/DL (ref 5–40)
WBC NRBC COR # BLD: 7.7 10*3/MM3 (ref 3.4–10.8)

## 2023-11-02 PROCEDURE — 83540 ASSAY OF IRON: CPT | Performed by: FAMILY MEDICINE

## 2023-11-02 PROCEDURE — 86376 MICROSOMAL ANTIBODY EACH: CPT | Performed by: FAMILY MEDICINE

## 2023-11-02 PROCEDURE — 80050 GENERAL HEALTH PANEL: CPT | Performed by: FAMILY MEDICINE

## 2023-11-02 PROCEDURE — 36415 COLL VENOUS BLD VENIPUNCTURE: CPT | Performed by: FAMILY MEDICINE

## 2023-11-02 PROCEDURE — 84466 ASSAY OF TRANSFERRIN: CPT | Performed by: FAMILY MEDICINE

## 2023-11-02 PROCEDURE — 85652 RBC SED RATE AUTOMATED: CPT | Performed by: FAMILY MEDICINE

## 2023-11-02 PROCEDURE — 86140 C-REACTIVE PROTEIN: CPT | Performed by: FAMILY MEDICINE

## 2023-11-02 PROCEDURE — 84481 FREE ASSAY (FT-3): CPT | Performed by: FAMILY MEDICINE

## 2023-11-02 PROCEDURE — 99214 OFFICE O/P EST MOD 30 MIN: CPT | Performed by: FAMILY MEDICINE

## 2023-11-02 PROCEDURE — 80061 LIPID PANEL: CPT | Performed by: FAMILY MEDICINE

## 2023-11-02 PROCEDURE — 82306 VITAMIN D 25 HYDROXY: CPT | Performed by: FAMILY MEDICINE

## 2023-11-02 PROCEDURE — 84439 ASSAY OF FREE THYROXINE: CPT | Performed by: FAMILY MEDICINE

## 2023-11-02 NOTE — PROGRESS NOTES
"Subjective   Carlota Brian is a 58 y.o. female.     Chief Complaint   Patient presents with    Hypothyroidism     3 month follow up         Visit Vitals  /82 (BP Location: Right arm, Patient Position: Sitting, Cuff Size: Adult)   Pulse 85   Temp 97.7 °F (36.5 °C)   Ht 173.5 cm (68.3\")   Wt 78.5 kg (173 lb)   SpO2 98%   BMI 26.07 kg/m²         Hypothyroidism  This is a chronic problem. The current episode started more than 1 year ago. The problem occurs constantly. The problem has been unchanged. Pertinent negatives include no abdominal pain, anorexia, arthralgias, change in bowel habit, chest pain, chills, congestion, coughing, diaphoresis, fatigue, fever, headaches, joint swelling, myalgias, nausea, neck pain, numbness, rash, sore throat, swollen glands, urinary symptoms, vertigo, visual change, vomiting or weakness. Nothing aggravates the symptoms. Treatments tried: thyroid hormone. The treatment provided significant relief.      Pt has intermittent thyroid pain. Pt has hypothyroid and is on medication.   Pt reports that her endocrinologist Dr Lion retired.   Pt has has vitamin D deficiency, will recheck level.   The following portions of the patient's history were reviewed and updated as appropriate: allergies, current medications, past family history, past medical history, past social history, past surgical history, and problem list.    Past Medical History:   Diagnosis Date    Basal cell carcinoma (BCC) of forehead 2008    left removed by Mohs    Family history of aortic aneurysm     Dad and P uncles and P aunt:  thoracic ascending    Hypothyroidism     Menopause     Plantar fasciitis     Seasonal allergies     Type B blood, Rh positive 10/31/2019    Vitamin D deficiency 05/10/2018      Past Surgical History:   Procedure Laterality Date    COLONOSCOPY      age 48 in [place]    EAR TUBES Bilateral     MOHS SURGERY Left 2008    ft forehead BCCA      Family History   Problem Relation Age of Onset    " "Stroke Mother         brain bleed    Thyroid disease Mother     Coronary artery disease Mother     Hypertension Mother     Hyperlipidemia Mother     Scleroderma Mother     Dumont's esophagus Mother     Peripheral vascular disease Mother     Hepatitis Mother         ? hep C from transfusion    Fibromyalgia Mother     Aneurysm Father     Thyroid disease Sister     Seizures Sister     Other Brother         repair of mitral valve 2 cord ruptured    Autism Son     Heart attack Paternal Aunt     Aneurysm Paternal Aunt     Dementia Paternal Aunt     Lung cancer Paternal Aunt     Heart attack Paternal Uncle     Aneurysm Paternal Uncle     Heart attack Paternal Uncle     No Known Problems Paternal Grandmother     Heart attack Paternal Grandfather       Social History     Socioeconomic History    Marital status:    Tobacco Use    Smoking status: Never    Smokeless tobacco: Never   Vaping Use    Vaping Use: Never used   Substance and Sexual Activity    Alcohol use: Yes     Comment: occ    Drug use: No    Sexual activity: Yes     Partners: Male     Birth control/protection: Post-menopausal     Comment:       Allergies   Allergen Reactions    Blue Dyes (Parenteral) Anaphylaxis    Penicillins Other (See Comments)     Patient states that \"cillins\" do not work for her.    Azithromycin Rash     Zpack breaks out in rash       Review of Systems   Constitutional: Negative.  Negative for chills, diaphoresis, fatigue and fever.   HENT:  Positive for rhinorrhea. Negative for congestion, ear pain, nosebleeds, postnasal drip, sinus pressure, sneezing and sore throat.    Eyes: Negative.  Negative for redness and itching.   Respiratory: Negative.  Negative for cough, shortness of breath and wheezing.    Cardiovascular: Negative.  Negative for chest pain, palpitations and leg swelling.   Gastrointestinal: Negative.  Negative for abdominal pain, anorexia, change in bowel habit, constipation, diarrhea, nausea and vomiting. "   Endocrine: Negative.  Negative for cold intolerance and heat intolerance.   Genitourinary: Negative.  Negative for dysuria, frequency, hematuria and urgency.   Musculoskeletal: Negative.  Negative for arthralgias, back pain, joint swelling, myalgias and neck pain.   Skin: Negative.  Negative for color change and rash.   Allergic/Immunologic: Negative.  Negative for environmental allergies.   Neurological:  Negative for dizziness, vertigo, syncope, weakness, light-headedness, numbness and headaches.   Hematological: Negative.  Negative for adenopathy. Does not bruise/bleed easily.   Psychiatric/Behavioral:  Positive for sleep disturbance. Negative for dysphoric mood. The patient is not nervous/anxious.        Objective   Physical Exam  Vitals and nursing note reviewed.   Constitutional:       Appearance: She is well-developed.   HENT:      Head: Normocephalic.      Right Ear: External ear normal.      Left Ear: External ear normal.      Nose: Nose normal.   Eyes:      General: Lids are normal.      Conjunctiva/sclera: Conjunctivae normal.      Pupils: Pupils are equal, round, and reactive to light.   Neck:      Thyroid: No thyroid mass, thyromegaly or thyroid tenderness.      Vascular: No carotid bruit.      Trachea: Trachea normal.   Cardiovascular:      Rate and Rhythm: Normal rate and regular rhythm.      Heart sounds: No murmur heard.  Pulmonary:      Effort: Pulmonary effort is normal. No respiratory distress.      Breath sounds: Normal breath sounds. No decreased breath sounds, wheezing, rhonchi or rales.   Chest:      Chest wall: No tenderness.   Abdominal:      General: Bowel sounds are normal.      Palpations: Abdomen is soft.      Tenderness: There is no abdominal tenderness.   Musculoskeletal:         General: Normal range of motion.      Cervical back: Normal range of motion and neck supple.   Skin:     General: Skin is warm and dry.   Neurological:      Mental Status: She is alert and oriented to  person, place, and time.   Psychiatric:         Behavior: Behavior normal.         Assessment & Plan   Problems Addressed this Visit          Endocrine and Metabolic    Hypothyroidism    Relevant Orders    Iron Profile    Vitamin D deficiency    Relevant Orders    Vitamin D,25-Hydroxy     Other Visit Diagnoses       Thyroid pain    -  Primary    Relevant Orders    TSH    T4, Free    CBC & Differential    Sedimentation Rate    C-reactive Protein    T3, Free    US Thyroid    Thyroid Peroxidase Antibody    Lipids abnormal        Relevant Orders    Comprehensive Metabolic Panel    Lipid Panel          Diagnoses         Codes Comments    Thyroid pain    -  Primary ICD-10-CM: E07.89  ICD-9-CM: 246.8     Vitamin D deficiency     ICD-10-CM: E55.9  ICD-9-CM: 268.9     Acquired hypothyroidism     ICD-10-CM: E03.9  ICD-9-CM: 244.9     Lipids abnormal     ICD-10-CM: E78.89  ICD-9-CM: 272.9           Discussed Shingrix influenza, covid booster and RSV.              Current Outpatient Medications:     albuterol sulfate  (90 Base) MCG/ACT inhaler, Inhale 2 puffs Every 4 (Four) Hours As Needed for Wheezing., Disp: 18 g, Rfl: 0    alendronate (Fosamax) 70 MG tablet, Take 1 tablet by mouth Every 7 (Seven) Days., Disp: 12 tablet, Rfl: 3    Calcium Carbonate 1500 (600 Ca) MG tablet, Take 1 tablet by mouth Daily., Disp: , Rfl:     Cholecalciferol (VITAMIN D3) 2000 units capsule, Take 1 capsule by mouth Daily., Disp: , Rfl:     ELDERBERRY PO, Take  by mouth., Disp: , Rfl:     fluconazole (Diflucan) 150 MG tablet, Take 1 tablet by mouth 1 (One) Time As Needed (yeast infection) for up to 1 dose., Disp: 1 tablet, Rfl: 0    levocetirizine (XYZAL) 5 MG tablet, Take 1 tablet by mouth Daily., Disp: 90 tablet, Rfl: 3    levothyroxine (SYNTHROID, LEVOTHROID) 88 MCG tablet, Take 1 tablet by mouth Daily., Disp: 180 tablet, Rfl: 1    multivitamin with minerals tablet tablet, Take 1 tablet by mouth Daily., Disp: , Rfl:     Unable to find, 1  each 1 (One) Time. Compounded estradiol vaginal cream 0.1 mg/gm 0.5 grams by vaginal route 2 X week., Disp: , Rfl:     Return in about 6 months (around 5/2/2024), or if symptoms worsen or fail to improve, for Recheck.

## 2023-11-03 LAB — THYROPEROXIDASE AB SERPL-ACNC: <9 IU/ML (ref 0–34)

## 2023-11-28 ENCOUNTER — TELEPHONE (OUTPATIENT)
Dept: INTERNAL MEDICINE | Facility: CLINIC | Age: 58
End: 2023-11-28
Payer: COMMERCIAL

## 2023-11-28 DIAGNOSIS — E78.89 LIPIDS ABNORMAL: Primary | ICD-10-CM

## 2023-11-28 NOTE — TELEPHONE ENCOUNTER
Caller: Carlota Brian    Relationship: Self    Best call back number: [phone]     What orders are you requesting (i.e. lab or imaging): LIPO-PROTEIN A TEST    In what timeframe would the patient need to come in: AS SOON AS ABLE    Where will you receive your lab/imaging services: [facility] OFFICE    Additional notes: PATIENT WAS ADVISED BY SIBLING TO HAVE A LIPO-PROTEIN A TEST DONE    PATIENT WOULD LIKE FOR ORDERS TO BE MADE FOR THEM TO COME IN AND HAVE THAT DONE    PLEASE ADVISE ONCE THE ORDERS ARE IN

## 2023-12-01 NOTE — TELEPHONE ENCOUNTER
Name: Carlota Brian      Relationship: Self      Best Callback Number: [phone]      HUB PROVIDED THE RELAY MESSAGE FROM THE OFFICE      PATIENT: VOICED UNDERSTANDING AND HAS NO FURTHER QUESTIONS AT THIS TIME    ADDITIONAL INFORMATION: PATIENT'S SIBLING DOES HAVE THE ABNORMALITY. THEIR READING WAS  185

## 2023-12-27 NOTE — TELEPHONE ENCOUNTER
PATIENT IS CALLING BACK TO CHECK ON THE LAB ORDERS SHE REQUESTED FOR lipoprotein     ADDITIONAL INFORMATION: PATIENT'S SIBLING DOES HAVE THE ABNORMALITY. THEIR READING WAS  185     PATIENT CALL BACK     [phone]  PLEASE CALL

## 2024-01-02 ENCOUNTER — LAB (OUTPATIENT)
Dept: INTERNAL MEDICINE | Facility: CLINIC | Age: 59
End: 2024-01-02
Payer: COMMERCIAL

## 2024-01-02 DIAGNOSIS — E78.89 LIPIDS ABNORMAL: ICD-10-CM

## 2024-01-02 PROCEDURE — 80061 LIPID PANEL: CPT | Performed by: FAMILY MEDICINE

## 2024-01-02 PROCEDURE — 36415 COLL VENOUS BLD VENIPUNCTURE: CPT | Performed by: FAMILY MEDICINE

## 2024-01-02 PROCEDURE — 83700 LIPOPRO BLD ELECTROPHORETIC: CPT | Performed by: FAMILY MEDICINE

## 2024-01-10 LAB
CHOLEST SERPL-MCNC: 208 MG/DL (ref 100–199)
HDLC SERPL-MCNC: 72 MG/DL
LDLC SERPL CALC-MCNC: 121 MG/DL (ref 0–99)
LP SERPL ELPH-IMP: ABNORMAL
TRIGL SERPL-MCNC: 84 MG/DL (ref 0–149)
VLDLC SERPL CALC-MCNC: 15 MG/DL (ref 5–40)

## 2024-01-16 ENCOUNTER — TELEPHONE (OUTPATIENT)
Dept: INTERNAL MEDICINE | Facility: CLINIC | Age: 59
End: 2024-01-16
Payer: COMMERCIAL

## 2024-01-16 NOTE — TELEPHONE ENCOUNTER
Caller: Carlota Brian    Relationship: Self    Best call back number: [phone]     Caller requesting test results: PATIENT    What test was performed: LABS, PARTICULARLY LIPOPROTEIN A    When was the test performed: 1/2/24    Where was the test performed: IN OFFICE    Additional notes: PHONE CALL PLEASE

## 2024-01-17 NOTE — TELEPHONE ENCOUNTER
PN of her lab results.  I read her the letter.  She asked if the Lipoprotein was the Lipoprotein A.  I let her know that I would forward the message to  and albino call her to let  herknow

## 2024-01-31 ENCOUNTER — TELEPHONE (OUTPATIENT)
Dept: INTERNAL MEDICINE | Facility: CLINIC | Age: 59
End: 2024-01-31
Payer: COMMERCIAL

## 2024-01-31 NOTE — TELEPHONE ENCOUNTER
Caller: Carlota Brian    Relationship: Self    Best call back number: [phone]     What is the medical concern/diagnosis: HIP PAIN     What specialty or service is being requested: PHYSICAL THERAPY     What is the provider, practice or medical service name: [facility]    What is the office location:   [address] [place] [id number]    What is the office phone number:   PHONE: [phone]   FAX: [phone]    Any additional details: PATIENT WAS REFERRED HERE FOR HER KNEE BEFORE AND WOULD LIKE TO RETURN

## 2024-01-31 NOTE — TELEPHONE ENCOUNTER
Left message on voicemail asking patient to make an appointment to be evaluated for her hip pain.  Then Dr. Ayala can do a referral for the hip pain.    HUB to relay;    If patient calls back please schedule her for an appointment.

## 2024-02-05 NOTE — TELEPHONE ENCOUNTER
HUB TO RELAY  LMTC.  Pt will need to schedule an appointment to be evaluated for the hip pain before we can do a referral. Please schedule an appointment for patient

## 2024-02-12 ENCOUNTER — HOSPITAL ENCOUNTER (OUTPATIENT)
Dept: GENERAL RADIOLOGY | Facility: HOSPITAL | Age: 59
Discharge: HOME | End: 2024-02-12
Admitting: FAMILY MEDICINE
Payer: COMMERCIAL

## 2024-02-12 ENCOUNTER — OFFICE VISIT (OUTPATIENT)
Dept: INTERNAL MEDICINE | Facility: CLINIC | Age: 59
End: 2024-02-12
Payer: COMMERCIAL

## 2024-02-12 VITALS
OXYGEN SATURATION: 98 % | TEMPERATURE: 97.8 F | BODY MASS INDEX: 24.4 KG/M2 | DIASTOLIC BLOOD PRESSURE: 82 MMHG | HEART RATE: 65 BPM | SYSTOLIC BLOOD PRESSURE: 124 MMHG | WEIGHT: 161 LBS | HEIGHT: 68 IN

## 2024-02-12 DIAGNOSIS — M25.552 CHRONIC LEFT HIP PAIN: ICD-10-CM

## 2024-02-12 DIAGNOSIS — G89.29 CHRONIC LEFT HIP PAIN: Primary | ICD-10-CM

## 2024-02-12 DIAGNOSIS — M25.552 CHRONIC LEFT HIP PAIN: Primary | ICD-10-CM

## 2024-02-12 DIAGNOSIS — G89.29 CHRONIC LEFT HIP PAIN: ICD-10-CM

## 2024-02-12 PROCEDURE — 99213 OFFICE O/P EST LOW 20 MIN: CPT | Performed by: FAMILY MEDICINE

## 2024-02-12 PROCEDURE — 73502 X-RAY EXAM HIP UNI 2-3 VIEWS: CPT

## 2024-03-05 ENCOUNTER — TREATMENT (OUTPATIENT)
Dept: PHYSICAL THERAPY | Facility: CLINIC | Age: 59
End: 2024-03-05
Payer: COMMERCIAL

## 2024-03-05 DIAGNOSIS — M25.552 LEFT HIP PAIN: Primary | ICD-10-CM

## 2024-03-05 PROCEDURE — 97162 PT EVAL MOD COMPLEX 30 MIN: CPT | Performed by: PHYSICAL THERAPIST

## 2024-03-05 PROCEDURE — 97110 THERAPEUTIC EXERCISES: CPT | Performed by: PHYSICAL THERAPIST

## 2024-03-05 NOTE — PROGRESS NOTES
Physical Therapy Initial Evaluation and Plan of Care  [address]  [place] [address]    Patient: Carlota Brian   : 1965  Diagnosis/ICD-10 Code:  No primary diagnosis found.  Referring practitioner: Anh GAMINO MD  Date of Initial Visit: 3/5/2024  Today's Date: 3/5/2024  Patient seen for Visit count could not be calculated. Make sure you are using a visit which is associated with an episode. session         Visit Diagnoses:  No diagnosis found.      Subjective Questionnaire: LEFS: 64      Subjective Evaluation    History of Present Illness  Mechanism of injury: Pt is a 58 year old female presenting to the clinic with left hip pain. This has been ongoing for years. She notices she has a lot of pain with getting in and out of the car. She reports if she walks on concrete she has more pain. [place] with her  is very painful in the left hip. She also has pain with trying to bend down or lift her leg up to tie her shoes. Her pain is in the front and side of the hip. She has been doing the PT she did previously for her knee. The only exercise that seems to bother her is the resisted side step. She sees a chiropractor to lumbar radiculopathy once per month.      Patient Occupation: [employer] Quality of life: excellent    Pain  Current pain ratin  At worst pain ratin  Quality: sharp  Aggravating factors: ambulation, repetitive movement and movement  Progression: no change    Patient Goals  Patient goals for therapy: decreased pain, increased motion, independence with ADLs/IADLs, increased strength and return to sport/leisure activities             Objective          Palpation   Left   Tenderness of the iliopsoas and TFL.     Active Range of Motion   Left Hip   Flexion: 90 degrees with pain  Abduction: 20 degrees with pain    Right Hip   Flexion: 110 degrees   Abduction: 40 degrees     Strength/Myotome Testing     Left Hip   Planes of Motion   Flexion:  4+  Extension: 4  Abduction: 4    Isolated Muscles   TFL: 3    Right Hip   Planes of Motion   Flexion: 5  Extension: 5  Abduction: 5    Isolated Muscles   TFL: 4+    Tests     Left Hip   Positive FADIR and scour.   Negative BERNARDA.           Assessment & Plan       Assessment  Impairments: abnormal or restricted ROM, activity intolerance, impaired physical strength, lacks appropriate home exercise program and pain with function   Functional limitations: walking, uncomfortable because of pain, moving in bed and unable to perform repetitive tasks   Assessment details: Patient is a 58-year-old female presenting to the clinic with left hip pain.  She demonstrates decreased left hip active range of motion, decreased strength, and positive scour and FADIR testing.  Her impairments are limiting her ability to get in and out of the car and tie her shoes without pain.  Patient would benefit from skilled PT services to address her impairments so she can reach her long-term goals.  Prognosis: good    Goals  Plan Goals: SHORT TERM GOALS:  2 weeks       1. Pt independent with HEP  2. Pt to demonstrate melissa hip strength 4/5 or greater to improve stability with ambulation  3. Pt to report being able to walk for 20 minutes without increasing pain in the left hip    LONG TERM GOALS:   6 weeks  1. Pt to demonstrate ability to perform full functional squat with good form and control of the hips and without increasing pain  2. Pt to demonstrate melissa hip strength to 4+/5 or greater to improve safety with ambulation on uneven surfaces  3. Pt to return to work full duty without increased pain in the left hip(s)   4. Pt to demonstrate ability to perform step up/down 8 inch step x10 safely and without pain in the left hip(s)      Plan  Therapy options: will be seen for skilled therapy services  Planned modality interventions: cryotherapy, dry needling, electrical stimulation/Russian stimulation, high voltage pulsed current (pain management),  TENS, thermotherapy (hydrocollator packs) and ultrasound  Planned therapy interventions: manual therapy, neuromuscular re-education, postural training, soft tissue mobilization, spinal/joint mobilization, strengthening, stretching, therapeutic activities, home exercise program, gait training, body mechanics training and balance/weight-bearing training  Duration in visits: 1  Duration in weeks: 12  Treatment plan discussed with: patient        History # of Personal Factors and/or Comorbidities: MODERATE (1-2)  Examination of Body System(s): # of elements: MODERATE (3)  Clinical Presentation: STABLE   Clinical Decision Making: MODERATE      Timed:         Manual Therapy:         mins  56017;     Therapeutic Exercise:    14     mins  98825;     Neuromuscular Molina:        mins  46289;    Therapeutic Activity:          mins  49550;     Gait Training:           mins  09843;     Ultrasound:          mins  78561;    Ionto                                   mins   85319  Self Care                            mins   84438  Canalith Repos         mins 17701      Un-Timed:  Electrical Stimulation:         mins  25258 ( );  Dry Needling          mins self-pay  Traction          mins 78323  Low Eval          Mins  07882  Mod Eval     28     Mins  01750  High Eval                            Mins  61479        Timed Treatment:   14   mins   Total Treatment:     42   mins          PT: Nurys Wood PT   License Number: [id number]  Electronically signed by Nurys Wood PT, 03/05/24, 11:08 AM EST    Certification Period: 3/5/2024 thru 6/2/2024  I certify that the therapy services are furnished while this patient is under my care.  The services outlined above are required by this patient, and will be reviewed every 90 days.         Physician Signature:__________________________________________________    PHYSICIAN: Anh Ayala MD  NPI: [id number]                                      DATE:      Please  sign and return via fax to [email or web address] . Thank you, [facility].

## 2024-03-28 ENCOUNTER — TREATMENT (OUTPATIENT)
Dept: PHYSICAL THERAPY | Facility: CLINIC | Age: 59
End: 2024-03-28
Payer: COMMERCIAL

## 2024-03-28 DIAGNOSIS — M25.552 LEFT HIP PAIN: Primary | ICD-10-CM

## 2024-03-28 PROCEDURE — 97112 NEUROMUSCULAR REEDUCATION: CPT | Performed by: PHYSICAL THERAPIST

## 2024-03-28 PROCEDURE — 97530 THERAPEUTIC ACTIVITIES: CPT | Performed by: PHYSICAL THERAPIST

## 2024-03-28 PROCEDURE — 97110 THERAPEUTIC EXERCISES: CPT | Performed by: PHYSICAL THERAPIST

## 2024-03-28 NOTE — PROGRESS NOTES
Physical Therapy Daily Progress Note    Subjective   Carlota Brian reports: she feels like things are getting better. She wants to review her HEP.      Objective   See Exercise, Manual, and Modality Logs for complete treatment.       Assessment/Plan  Pt tolerated treatment well. She required cues to not allow knee movement with TA marches and to focus more on hip movement. She was able to tolerate more advanced exercises without pain, just fatigue. Pt would benefit from continued skilled PT.    Progress per Plan of Care           Manual Therapy:         mins  83584;  Therapeutic Exercise:    24     mins  94760;     Neuromuscular Molina:    10    mins  69703;    Therapeutic Activity:     10     mins  08370;     Gait Training:           mins  82473;     Ultrasound:          mins  09999;    Electrical Stimulation:        mins  07141 ( );  E-Stim Attended:         mins  42205  Iontophoresis          mins 78197   Traction          mins  69773  Fluidotherapy          mins  62872  Dry Needling          mins self-pay - No Charge  Paraffin          mins  83949    Timed Treatment:   44   mins   Total Treatment:     44   mins    Nurys Wood, PT, DPT  Physical Therapist

## 2024-04-02 ENCOUNTER — TREATMENT (OUTPATIENT)
Dept: PHYSICAL THERAPY | Facility: CLINIC | Age: 59
End: 2024-04-02
Payer: COMMERCIAL

## 2024-04-02 DIAGNOSIS — M25.552 LEFT HIP PAIN: Primary | ICD-10-CM

## 2024-04-02 PROCEDURE — 97110 THERAPEUTIC EXERCISES: CPT | Performed by: PHYSICAL THERAPIST

## 2024-04-02 PROCEDURE — 97530 THERAPEUTIC ACTIVITIES: CPT | Performed by: PHYSICAL THERAPIST

## 2024-04-02 NOTE — PROGRESS NOTES
Physical Therapy Daily Progress Note    Subjective   Carlota Brian reports: she is sore from the exercises but is doing well overall.       Objective   See Exercise, Manual, and Modality Logs for complete treatment.       Assessment/Plan  Pt tolerated treatment well. Discussed that she can reduce her HEP to 4 exercises per day in order to maintain strength without overdoing it. She is progressing well and would benefit from continued skilled PT.     Progress per Plan of Care           Manual Therapy:         mins  38808;  Therapeutic Exercise:    27     mins  51292;     Neuromuscular Molina:        mins  65883;    Therapeutic Activity:     15     mins  19406;     Gait Training:           mins  36620;     Ultrasound:          mins  23640;    Electrical Stimulation:         mins  92973 ( );  E-Stim Attended:         mins  64503  Iontophoresis          mins 51687   Traction          mins  46101  Fluidotherapy          mins  32626  Dry Needling          mins self-pay - No Charge  Paraffin          mins  34078    Timed Treatment:   42   mins   Total Treatment:     42   mins    Nurys Wood, PT, DPT  Physical Therapist

## 2024-04-16 ENCOUNTER — TREATMENT (OUTPATIENT)
Dept: PHYSICAL THERAPY | Facility: CLINIC | Age: 59
End: 2024-04-16
Payer: COMMERCIAL

## 2024-04-16 DIAGNOSIS — M25.552 LEFT HIP PAIN: Primary | ICD-10-CM

## 2024-04-16 PROCEDURE — 97110 THERAPEUTIC EXERCISES: CPT | Performed by: PHYSICAL THERAPIST

## 2024-04-16 PROCEDURE — 97112 NEUROMUSCULAR REEDUCATION: CPT | Performed by: PHYSICAL THERAPIST

## 2024-04-16 PROCEDURE — 97530 THERAPEUTIC ACTIVITIES: CPT | Performed by: PHYSICAL THERAPIST

## 2024-04-16 NOTE — PROGRESS NOTES
Physical Therapy Daily Progress Note    Subjective   Carlota Brian reports: she wants to review some of her HEP because there are a couple of exercises that cause pain.     Objective   See Exercise, Manual, and Modality Logs for complete treatment.       Assessment/Plan  Pt tolerated treatment well. She was educated on more advanced versions of some of her HEP. Supine resisted marching was discontinued due to pain. Pt would benefit from continued skilled PT.     Progress per Plan of Care           Manual Therapy:         mins  59958;  Therapeutic Exercise:    23     mins  24510;     Neuromuscular Molina:    12    mins  71947;    Therapeutic Activity:     8     mins  97002;     Gait Training:           mins  32910;     Ultrasound:          mins  77212;    Electrical Stimulation:         mins  82255 ( );  E-Stim Attended:         mins  16126  Iontophoresis          mins 30885   Traction          mins  35517  Fluidotherapy          mins  92527  Dry Needling          mins self-pay - No Charge  Paraffin          mins  09839    Timed Treatment:   43   mins   Total Treatment:     43   mins    Nurys Wood, PT, DPT  Physical Therapist

## 2024-04-25 ENCOUNTER — OFFICE VISIT (OUTPATIENT)
Dept: OBSTETRICS AND GYNECOLOGY | Facility: CLINIC | Age: 59
End: 2024-04-25
Payer: COMMERCIAL

## 2024-04-25 VITALS
WEIGHT: 151.8 LBS | DIASTOLIC BLOOD PRESSURE: 78 MMHG | SYSTOLIC BLOOD PRESSURE: 116 MMHG | BODY MASS INDEX: 23.01 KG/M2 | HEIGHT: 68 IN

## 2024-04-25 DIAGNOSIS — Z01.419 ENCOUNTER FOR GYNECOLOGICAL EXAMINATION WITHOUT ABNORMAL FINDING: Primary | ICD-10-CM

## 2024-04-25 DIAGNOSIS — Z13.820 SCREENING FOR OSTEOPOROSIS: ICD-10-CM

## 2024-04-25 DIAGNOSIS — Z12.11 SCREENING FOR COLON CANCER: ICD-10-CM

## 2024-04-25 DIAGNOSIS — Z78.0 POSTMENOPAUSAL: ICD-10-CM

## 2024-04-25 DIAGNOSIS — N95.2 ATROPHY OF VAGINA: ICD-10-CM

## 2024-04-25 PROCEDURE — 99459 PELVIC EXAMINATION: CPT | Performed by: NURSE PRACTITIONER

## 2024-04-25 PROCEDURE — 99396 PREV VISIT EST AGE 40-64: CPT | Performed by: NURSE PRACTITIONER

## 2024-04-26 ENCOUNTER — TELEPHONE (OUTPATIENT)
Dept: OBSTETRICS AND GYNECOLOGY | Facility: CLINIC | Age: 59
End: 2024-04-26
Payer: COMMERCIAL

## 2024-04-26 NOTE — TELEPHONE ENCOUNTER
Mandi, this lady was here yesterday and she says that her refill for Fosamax wasn't called in? I didn't see it in there. Can you take care of this for her this evening?     Thank you!

## 2024-04-27 DIAGNOSIS — M85.89 OSTEOPENIA OF MULTIPLE SITES: ICD-10-CM

## 2024-04-27 RX ORDER — ALENDRONATE SODIUM 70 MG/1
70 TABLET ORAL
Qty: 12 TABLET | Refills: 3 | Status: SHIPPED | OUTPATIENT
Start: 2024-04-27

## 2024-04-29 LAB — REF LAB TEST METHOD: NORMAL

## 2024-04-30 ENCOUNTER — TREATMENT (OUTPATIENT)
Dept: PHYSICAL THERAPY | Facility: CLINIC | Age: 59
End: 2024-04-30
Payer: COMMERCIAL

## 2024-04-30 DIAGNOSIS — M25.552 LEFT HIP PAIN: Primary | ICD-10-CM

## 2024-04-30 PROCEDURE — 97112 NEUROMUSCULAR REEDUCATION: CPT | Performed by: PHYSICAL THERAPIST

## 2024-04-30 PROCEDURE — 97140 MANUAL THERAPY 1/> REGIONS: CPT | Performed by: PHYSICAL THERAPIST

## 2024-04-30 PROCEDURE — 97110 THERAPEUTIC EXERCISES: CPT | Performed by: PHYSICAL THERAPIST

## 2024-04-30 NOTE — PROGRESS NOTES
Physical Therapy Daily Treatment Note    [facility]   [address], [address] [place] [address]      Patient: Carlota Brian   : 1965  Referring practitioner: Anh GAMINO MD  Date of Initial Visit: Type: THERAPY  Noted: 3/5/2024  Today's Date: 2024  Patient seen for 5 sessions    Certification Period 2024 thru 2024       Visit Diagnoses:    ICD-10-CM ICD-9-CM   1. Left hip pain  M25.552 719.45       Subjective     Patient states that she feels like she is making progress with therapy although she continues to have discomfort at the front of the left hip and radiating around to the side of the left hip.  She has been performing stretching exercise at home and has not had any significant exacerbation of symptoms with exercise.    Objective   See Exercise, Manual, and Modality Logs for complete treatment.     Restriction noted in patient's left hip internal range of motion.  Patient presents with tenderness to palpation at the left hip flexor and left quad muscles.    Assessment/Plan     Left hip long axis distraction performed in the clinic today with a slight improvement in patient's internal rotation and external rotation range of motion of the left hip.  Addressed patient's tightness in the left quad and hip flexor with stretching and soft tissue massage.  Patient instructed to perform stretching at home and perform tissue manipulation using a rolling pin on the left thigh as well.  Will assess patient's response at the next visit and progress as indicated.      Timed:         Manual Therapy:    18     mins  20973;     Therapeutic Exercise:    15     mins  09609;     Neuromuscular Molina:    15    mins  99759;    Therapeutic Activity:          mins  50226;     Gait Training:           mins  71374;     Ultrasound:          mins  86505;    Ionto                                   mins   78081  Self Care                            mins   48929  [facility]         mins  31109  Electrical Stimulation:         mins  05758    Un-Timed:  Electrical Stimulation:         mins  91776 ( );  Dry Needling          mins self-pay  Traction          mins 87235      Timed Treatment:   48   mins   Total Treatment:     48   mins    Misael Parkinson PT, DPT, OCS, Cert. DN  [place] License: [id number]

## 2024-05-02 ENCOUNTER — OFFICE VISIT (OUTPATIENT)
Dept: INTERNAL MEDICINE | Facility: CLINIC | Age: 59
End: 2024-05-02
Payer: COMMERCIAL

## 2024-05-02 ENCOUNTER — LAB (OUTPATIENT)
Dept: INTERNAL MEDICINE | Facility: CLINIC | Age: 59
End: 2024-05-02
Payer: COMMERCIAL

## 2024-05-02 VITALS
TEMPERATURE: 97.1 F | HEIGHT: 68 IN | OXYGEN SATURATION: 98 % | HEART RATE: 72 BPM | WEIGHT: 150 LBS | SYSTOLIC BLOOD PRESSURE: 130 MMHG | BODY MASS INDEX: 22.73 KG/M2 | DIASTOLIC BLOOD PRESSURE: 86 MMHG

## 2024-05-02 DIAGNOSIS — E78.00 ELEVATED CHOLESTEROL: ICD-10-CM

## 2024-05-02 DIAGNOSIS — E03.9 ACQUIRED HYPOTHYROIDISM: ICD-10-CM

## 2024-05-02 DIAGNOSIS — E55.9 VITAMIN D DEFICIENCY: ICD-10-CM

## 2024-05-02 DIAGNOSIS — M25.552 HIP PAIN, CHRONIC, LEFT: Primary | ICD-10-CM

## 2024-05-02 DIAGNOSIS — G89.29 HIP PAIN, CHRONIC, LEFT: Primary | ICD-10-CM

## 2024-05-02 LAB
25(OH)D3 SERPL-MCNC: 65.1 NG/ML (ref 30–100)
ALBUMIN SERPL-MCNC: 4.5 G/DL (ref 3.5–5.2)
ALBUMIN/GLOB SERPL: 1.6 G/DL
ALP SERPL-CCNC: 73 U/L (ref 39–117)
ALT SERPL W P-5'-P-CCNC: 17 U/L (ref 1–33)
ANION GAP SERPL CALCULATED.3IONS-SCNC: 12.2 MMOL/L (ref 5–15)
AST SERPL-CCNC: 20 U/L (ref 1–32)
BILIRUB SERPL-MCNC: 0.5 MG/DL (ref 0–1.2)
BUN SERPL-MCNC: 17 MG/DL (ref 8–23)
BUN/CREAT SERPL: 19.1 (ref 7–25)
CALCIUM SPEC-SCNC: 9.9 MG/DL (ref 8.6–10.5)
CHLORIDE SERPL-SCNC: 103 MMOL/L (ref 98–107)
CHOLEST SERPL-MCNC: 211 MG/DL (ref 0–200)
CO2 SERPL-SCNC: 25.8 MMOL/L (ref 22–29)
CREAT SERPL-MCNC: 0.89 MG/DL (ref 0.57–1)
EGFRCR SERPLBLD CKD-EPI 2021: 74.3 ML/MIN/1.73
GLOBULIN UR ELPH-MCNC: 2.9 GM/DL
GLUCOSE SERPL-MCNC: 87 MG/DL (ref 65–99)
HDLC SERPL-MCNC: 80 MG/DL (ref 40–60)
LDLC SERPL CALC-MCNC: 122 MG/DL (ref 0–100)
LDLC/HDLC SERPL: 1.51 {RATIO}
POTASSIUM SERPL-SCNC: 4.6 MMOL/L (ref 3.5–5.2)
PROT SERPL-MCNC: 7.4 G/DL (ref 6–8.5)
SODIUM SERPL-SCNC: 141 MMOL/L (ref 136–145)
T4 FREE SERPL-MCNC: 1.81 NG/DL (ref 0.93–1.7)
TRIGL SERPL-MCNC: 52 MG/DL (ref 0–150)
TSH SERPL DL<=0.05 MIU/L-ACNC: 0.6 UIU/ML (ref 0.27–4.2)
VLDLC SERPL-MCNC: 9 MG/DL (ref 5–40)

## 2024-05-02 PROCEDURE — 84443 ASSAY THYROID STIM HORMONE: CPT | Performed by: FAMILY MEDICINE

## 2024-05-02 PROCEDURE — 36415 COLL VENOUS BLD VENIPUNCTURE: CPT | Performed by: FAMILY MEDICINE

## 2024-05-02 PROCEDURE — 82306 VITAMIN D 25 HYDROXY: CPT | Performed by: FAMILY MEDICINE

## 2024-05-02 PROCEDURE — 99214 OFFICE O/P EST MOD 30 MIN: CPT | Performed by: FAMILY MEDICINE

## 2024-05-02 PROCEDURE — 80053 COMPREHEN METABOLIC PANEL: CPT | Performed by: FAMILY MEDICINE

## 2024-05-02 PROCEDURE — 80061 LIPID PANEL: CPT | Performed by: FAMILY MEDICINE

## 2024-05-02 PROCEDURE — 84439 ASSAY OF FREE THYROXINE: CPT | Performed by: FAMILY MEDICINE

## 2024-05-02 RX ORDER — LEVOTHYROXINE SODIUM 88 UG/1
88 TABLET ORAL DAILY
Qty: 180 TABLET | Refills: 1 | Status: SHIPPED | OUTPATIENT
Start: 2024-05-02

## 2024-05-02 NOTE — PROGRESS NOTES
"Subjective   Carlota Brian is a 58 y.o. female.     Chief Complaint   Patient presents with    Hip Pain     6 Month follow up on left hip pain      Hypothyroidism       Visit Vitals  /86 (BP Location: Right arm, Patient Position: Sitting, Cuff Size: Adult)   Pulse 72   Temp 97.1 °F (36.2 °C)   Ht 173.5 cm (68.3\")   Wt 68 kg (150 lb)   SpO2 98%   BMI 22.61 kg/m²       Wt Readings from Last 3 Encounters:   05/02/24 68 kg (150 lb)   04/25/24 68.9 kg (151 lb 12.8 oz)   02/12/24 73 kg (161 lb)   11/02/23         78.5kg(173lb)    Hip Pain   The incident occurred more than 1 week ago. The injury mechanism was a twisting injury. The pain is present in the left hip. The pain is mild. The pain has been Fluctuating since onset. Pertinent negatives include no inability to bear weight, loss of motion, loss of sensation, muscle weakness, numbness or tingling. She reports no foreign bodies present. The symptoms are aggravated by weight bearing (sleeping on her back). Treatments tried: PT, walking. The treatment provided moderate relief.   Hypothyroidism  This is a chronic problem. The current episode started more than 1 year ago. The problem occurs constantly. The problem has been unchanged. Associated symptoms include anorexia. Pertinent negatives include no abdominal pain, arthralgias, change in bowel habit, chest pain, chills, congestion, coughing, diaphoresis, fatigue, fever, headaches, joint swelling, myalgias, nausea, neck pain, numbness, rash, sore throat, swollen glands, urinary symptoms, vertigo, visual change, vomiting or weakness. Nothing aggravates the symptoms. Treatments tried: thyroid hormone. The treatment provided significant relief.      Pt has been having left hip pain for the past 6 month and is improving with PT. Pt is walking 4-5 time per week. Pt is working on weight loss.     Pt is going to counseling.   Pt has had vitamin D deficiency.  Pt had elevated cholesterol on last lab.   The following " portions of the patient's history were reviewed and updated as appropriate: allergies, current medications, past family history, past medical history, past social history, past surgical history, and problem list.    Past Medical History:   Diagnosis Date    Allergic Always    Asthma Childhood    Basal cell carcinoma (BCC) of forehead 2008    left removed by Mohs    Family history of aortic aneurysm     Dad and P uncles and P aunt:  thoracic ascending    Hypothyroidism     Menopause     Plantar fasciitis     Seasonal allergies     Type B blood, Rh positive 10/31/2019    Urinary tract infection Once    Vitamin D deficiency 05/10/2018      Past Surgical History:   Procedure Laterality Date    COLONOSCOPY      age 48 in [place]    EAR TUBES Bilateral     MOHS SURGERY Left 2008    ft forehead BCCA      Family History   Problem Relation Age of Onset    Stroke Mother         brain bleed    Thyroid disease Mother     Coronary artery disease Mother     Hypertension Mother     Hyperlipidemia Mother     Scleroderma Mother     Dumont's esophagus Mother     Peripheral vascular disease Mother     Hepatitis Mother         ? hep C from transfusion    Fibromyalgia Mother     Aneurysm Father     Thyroid disease Sister     Seizures Sister     Other Brother         repair of mitral valve 2 cord ruptured    Autism Son     Heart attack Paternal Aunt     Aneurysm Paternal Aunt     Dementia Paternal Aunt     Lung cancer Paternal Aunt     Heart attack Paternal Uncle     Aneurysm Paternal Uncle     Heart attack Paternal Uncle     No Known Problems Paternal Grandmother     Heart attack Paternal Grandfather       Social History     Socioeconomic History    Marital status:    Tobacco Use    Smoking status: Never    Smokeless tobacco: Never   Vaping Use    Vaping status: Never Used   Substance and Sexual Activity    Alcohol use: Yes     Alcohol/week: 1.0 standard drink of alcohol     Types: 1 Drinks containing 0.5 oz of alcohol per  "week     Comment: occ    Drug use: No    Sexual activity: Yes     Partners: Male     Birth control/protection: Post-menopausal     Comment:       Allergies   Allergen Reactions    Blue Dyes (Parenteral) Anaphylaxis    Penicillins Other (See Comments)     Patient states that \"cillins\" do not work for her.    Azithromycin Rash     Zpack breaks out in rash       Review of Systems   Constitutional:  Negative for chills, diaphoresis, fatigue and fever.   HENT:  Negative for congestion and sore throat.    Respiratory:  Negative for cough.    Cardiovascular:  Negative for chest pain.   Gastrointestinal:  Positive for anorexia. Negative for abdominal pain, change in bowel habit, nausea and vomiting.   Musculoskeletal:  Negative for arthralgias, joint swelling, myalgias and neck pain.   Skin:  Negative for rash.   Neurological:  Negative for vertigo, tingling, weakness, numbness and headaches.   Psychiatric/Behavioral:  The patient is nervous/anxious (marital stress).        Objective   Physical Exam  Vitals and nursing note reviewed.   Constitutional:       Appearance: She is well-developed.   HENT:      Head: Normocephalic.      Right Ear: External ear normal.      Left Ear: External ear normal.      Nose: Nose normal.   Eyes:      General: Lids are normal.      Conjunctiva/sclera: Conjunctivae normal.      Pupils: Pupils are equal, round, and reactive to light.   Neck:      Thyroid: No thyroid mass or thyromegaly.      Vascular: No carotid bruit.      Trachea: Trachea normal.   Cardiovascular:      Rate and Rhythm: Normal rate and regular rhythm.      Heart sounds: No murmur heard.  Pulmonary:      Effort: Pulmonary effort is normal. No respiratory distress.      Breath sounds: Normal breath sounds. No decreased breath sounds, wheezing, rhonchi or rales.   Chest:      Chest wall: No tenderness.   Abdominal:      General: Bowel sounds are normal.      Palpations: Abdomen is soft.      Tenderness: There is no " abdominal tenderness.   Musculoskeletal:         General: Normal range of motion.      Cervical back: Normal range of motion and neck supple.   Skin:     General: Skin is warm and dry.   Neurological:      Mental Status: She is alert and oriented to person, place, and time.   Psychiatric:         Behavior: Behavior normal.         Assessment & Plan   Problems Addressed this Visit          Endocrine and Metabolic    Hypothyroidism    Relevant Medications    levothyroxine (SYNTHROID, LEVOTHROID) 88 MCG tablet    Other Relevant Orders    TSH    T4, Free    Vitamin D deficiency    Relevant Orders    Vitamin D,25-Hydroxy     Other Visit Diagnoses       Hip pain, chronic, left    -  Primary    Elevated cholesterol        Relevant Orders    Comprehensive Metabolic Panel    Lipid Panel          Diagnoses         Codes Comments    Hip pain, chronic, left    -  Primary ICD-10-CM: M25.552, G89.29  ICD-9-CM: 719.45, 338.29     Acquired hypothyroidism     ICD-10-CM: E03.9  ICD-9-CM: 244.9     Vitamin D deficiency     ICD-10-CM: E55.9  ICD-9-CM: 268.9     Elevated cholesterol     ICD-10-CM: E78.00  ICD-9-CM: 272.0                        Current Outpatient Medications:     albuterol sulfate  (90 Base) MCG/ACT inhaler, Inhale 2 puffs Every 4 (Four) Hours As Needed for Wheezing., Disp: 18 g, Rfl: 0    alendronate (Fosamax) 70 MG tablet, Take 1 tablet by mouth Every 7 (Seven) Days., Disp: 12 tablet, Rfl: 3    Calcium Carbonate 1500 (600 Ca) MG tablet, Take 1 tablet by mouth Daily., Disp: , Rfl:     Cholecalciferol (VITAMIN D3) 2000 units capsule, Take 1 capsule by mouth Daily., Disp: , Rfl:     ELDERBERRY PO, Take  by mouth., Disp: , Rfl:     fluconazole (Diflucan) 150 MG tablet, Take 1 tablet by mouth 1 (One) Time As Needed (yeast infection) for up to 1 dose., Disp: 1 tablet, Rfl: 0    levocetirizine (XYZAL) 5 MG tablet, Take 1 tablet by mouth Daily., Disp: 90 tablet, Rfl: 3    levothyroxine (SYNTHROID, LEVOTHROID) 88 MCG  tablet, Take 1 tablet by mouth Daily., Disp: 180 tablet, Rfl: 1    multivitamin with minerals tablet tablet, Take 1 tablet by mouth Daily., Disp: , Rfl:     Unable to find, 1 each 1 (One) Time. Compounded estradiol vaginal cream 0.1 mg/gm 0.5 grams by vaginal route 2 X week., Disp: , Rfl:     Return in about 6 months (around 11/2/2024), or if symptoms worsen or fail to improve, for Recheck, Annual.  Answers submitted by the patient for this visit:  Other (Submitted on 5/1/2024)  Please describe your symptoms.: 6 month checkup  Have you had these symptoms before?: Yes  How long have you been having these symptoms?: Greater than 2 weeks  Please list any medications you are currently taking for this condition.: Levothyroxine, levocetirizine, fosamax  Primary Reason for Visit (Submitted on 5/1/2024)  What is the primary reason for your visit?: Other

## 2024-05-03 ENCOUNTER — TELEPHONE (OUTPATIENT)
Dept: INTERNAL MEDICINE | Facility: CLINIC | Age: 59
End: 2024-05-03
Payer: COMMERCIAL

## 2024-05-15 RX ORDER — SODIUM PICOSULFATE, MAGNESIUM OXIDE, AND ANHYDROUS CITRIC ACID 12; 3.5; 1 G/175ML; G/175ML; MG/175ML
350 LIQUID ORAL ONCE
Qty: 350 ML | Refills: 0 | Status: SHIPPED | OUTPATIENT
Start: 2024-05-15 | End: 2024-05-15

## 2024-05-16 DIAGNOSIS — J30.2 SEASONAL ALLERGIES: ICD-10-CM

## 2024-05-17 RX ORDER — LEVOCETIRIZINE DIHYDROCHLORIDE 5 MG/1
5 TABLET, FILM COATED ORAL DAILY
Qty: 90 TABLET | Refills: 3 | Status: SHIPPED | OUTPATIENT
Start: 2024-05-17

## 2024-05-20 ENCOUNTER — TELEPHONE (OUTPATIENT)
Dept: PHYSICAL THERAPY | Facility: CLINIC | Age: 59
End: 2024-05-20

## 2024-05-20 NOTE — TELEPHONE ENCOUNTER
PATIENT WANTS DAYANA TO KNOW THAT SHE IS VERY SORRY FOR MISSING HER APPOINTMENT ON 5/16.  SHE WAS OUT OF STATE AND COULDN'T GET HOME

## 2024-05-23 ENCOUNTER — OUTSIDE FACILITY SERVICE (OUTPATIENT)
Dept: GASTROENTEROLOGY | Facility: CLINIC | Age: 59
End: 2024-05-23
Payer: COMMERCIAL

## 2024-05-23 PROCEDURE — 88305 TISSUE EXAM BY PATHOLOGIST: CPT | Performed by: INTERNAL MEDICINE

## 2024-05-23 PROCEDURE — 45380 COLONOSCOPY AND BIOPSY: CPT | Performed by: INTERNAL MEDICINE

## 2024-05-23 PROCEDURE — 45385 COLONOSCOPY W/LESION REMOVAL: CPT | Performed by: INTERNAL MEDICINE

## 2024-05-24 ENCOUNTER — LAB REQUISITION (OUTPATIENT)
Dept: LAB | Facility: HOSPITAL | Age: 59
End: 2024-05-24
Payer: COMMERCIAL

## 2024-05-24 DIAGNOSIS — Z12.11 ENCOUNTER FOR SCREENING FOR MALIGNANT NEOPLASM OF COLON: ICD-10-CM

## 2024-05-28 LAB — REF LAB TEST METHOD: NORMAL

## 2024-05-30 DIAGNOSIS — E03.9 ACQUIRED HYPOTHYROIDISM: ICD-10-CM

## 2024-05-30 RX ORDER — LEVOTHYROXINE SODIUM 88 UG/1
88 TABLET ORAL DAILY
Qty: 180 TABLET | Refills: 1 | OUTPATIENT
Start: 2024-05-30

## 2024-06-04 ENCOUNTER — TREATMENT (OUTPATIENT)
Dept: PHYSICAL THERAPY | Facility: CLINIC | Age: 59
End: 2024-06-04
Payer: COMMERCIAL

## 2024-06-04 DIAGNOSIS — M25.552 PAIN OF LEFT HIP: Primary | ICD-10-CM

## 2024-06-04 PROCEDURE — 97112 NEUROMUSCULAR REEDUCATION: CPT | Performed by: PHYSICAL THERAPIST

## 2024-06-04 PROCEDURE — 97110 THERAPEUTIC EXERCISES: CPT | Performed by: PHYSICAL THERAPIST

## 2024-06-04 NOTE — PROGRESS NOTES
Physical Therapy Daily Treatment Note    [facility]   [address], [address] [place] [address]      Patient: Carlota Brian   : 1965  Referring practitioner: Anh GAMINO MD  Date of Initial Visit: Type: THERAPY  Noted: 3/5/2024  Today's Date: 2024  Patient seen for 6 sessions    Certification Period 2024 thru 2024       Visit Diagnoses:    ICD-10-CM ICD-9-CM   1. Pain of left hip  M25.552 719.45       Subjective     Pt states that she feels like she is doing about the same and continues to have pain in the front of the quad and at the anterior and lateral aspect of the left hip, karlie when crossing her legs or when she is working on her exercises at home.     Objective   See Exercise, Manual, and Modality Logs for complete treatment.       Assessment/Plan     Reviewed HEP with the patient today and worked on editing and modifying exercises that caused some discomfort. Went over additional stretching as well and instructed pt on progression of stretching to exercise to improve his joint mobility before adding resistance. Will follow up in two weeks to assess her response to the modifications. Carlota Brian will continue to benefit from skilled physical therapy services to address deficits and continue to work towards reaching functional goals.           Timed:         Manual Therapy:         mins  68546;     Therapeutic Exercise:    35     mins  10474;     Neuromuscular Molina:    10    mins  03453;    Therapeutic Activity:          mins  72577;     Gait Training:           mins  50253;     Ultrasound:          mins  11100;    Ionto                                   mins   12619  Self Care                            mins   82536  Canalith Repos         mins 09978  Electrical Stimulation:         mins  37490    Un-Timed:  Electrical Stimulation:         mins  01918 ( );  Dry Needling          mins self-pay  Traction          mins 19842      Timed Treatment:   45   mins    Total Treatment:     45   mins    Misael Parkinson PT, DPT, Cert. DN  [place] License: [id number]

## 2024-06-20 ENCOUNTER — TREATMENT (OUTPATIENT)
Dept: PHYSICAL THERAPY | Facility: CLINIC | Age: 59
End: 2024-06-20
Payer: COMMERCIAL

## 2024-06-20 DIAGNOSIS — M25.552 PAIN OF LEFT HIP: Primary | ICD-10-CM

## 2024-06-27 NOTE — PROGRESS NOTES
Physical Therapy Daily Treatment Note    [facility]   [address], [address] [place] [address]      Patient: Carlota Brian   : 1965  Referring practitioner: Anh GAMINO MD  Date of Initial Visit: Type: THERAPY  Noted: 3/5/2024  Today's Date: 2024  Patient seen for 7 sessions    Certification Period 2024 thru 2024       Visit Diagnoses:    ICD-10-CM ICD-9-CM   1. Pain of left hip  M25.552 719.45       Subjective     Patient states that she is continue to have discomfort in the left hip, especially with resisted activity and prolonged positioning.  She reports compliance with home exercise program.    Objective   See Exercise, Manual, and Modality Logs for complete treatment.       Assessment/Plan     Reviewed stretching activities for the left hip to facilitate improved mobility of the femoral acetabular joint and to decrease compression due to muscle guarding.  Amended patient's home exercise program to include isometric hip adduction with movement to potentially gap the FA joint while promoting strength and stability. Carlota Brian will continue to benefit from skilled physical therapy services to address deficits and continue to work towards reaching functional goals.           Timed:         Manual Therapy:         mins  35165;     Therapeutic Exercise:    15     mins  34535;     Neuromuscular Molina:    38    mins  41416;    Therapeutic Activity:          mins  28023;     Gait Training:           mins  00647;     Ultrasound:          mins  92040;    Ionto                                   mins   09945  Self Care                            mins   48353  Canalith Repos         mins 38100  Electrical Stimulation:         mins  97860    Un-Timed:  Electrical Stimulation:         mins  85007 (MC );  Dry Needling          mins self-pay  Traction          mins 29560      Timed Treatment:   53   mins   Total Treatment:     53   mins    Misael Parkinson, PT, DPT, Cert. DN  [place]  License: [id number]

## 2024-07-11 ENCOUNTER — TREATMENT (OUTPATIENT)
Dept: PHYSICAL THERAPY | Facility: CLINIC | Age: 59
End: 2024-07-11
Payer: COMMERCIAL

## 2024-07-11 ENCOUNTER — TELEPHONE (OUTPATIENT)
Dept: INTERNAL MEDICINE | Facility: CLINIC | Age: 59
End: 2024-07-11
Payer: COMMERCIAL

## 2024-07-11 DIAGNOSIS — M25.552 CHRONIC LEFT HIP PAIN: Primary | ICD-10-CM

## 2024-07-11 DIAGNOSIS — G89.29 CHRONIC LEFT HIP PAIN: Primary | ICD-10-CM

## 2024-07-11 DIAGNOSIS — M25.552 PAIN OF LEFT HIP: Primary | ICD-10-CM

## 2024-07-11 NOTE — TELEPHONE ENCOUNTER
Left message on voicemail letting patient know MRI order has been placed.  Once it is approved through insurance and scheduled someone will give her a call.

## 2024-07-11 NOTE — TELEPHONE ENCOUNTER
Caller: Carlota Brian    Relationship: Self    Best call back number: [phone]    What orders are you requesting (i.e. lab or imaging): MRI LEFT HIP    In what timeframe would the patient need to come in: ASAP/ PREFERS TUESDAYS OR THURSDAY    Where will you receive your lab/imaging services: ANYWHERE IN [place] THAT TAKES HER INSURANCE    Additional notes: PATIENT HAS HAD PHYSICAL THERAPY FOR 2 MONTHS AND THERAPIST RECOMMENDED SHE HAVE AN MRI

## 2024-07-17 NOTE — PROGRESS NOTES
Physical Therapy Daily Treatment Note    [facility]   [address], [address] [place] [address]      Patient: Carlota Brian   : 1965  Referring practitioner: Anh GAMINO MD  Date of Initial Visit: Type: THERAPY  Noted: 3/5/2024  Today's Date: 2024  Patient seen for 8 sessions    Certification Period 2024 thru 10/14/2024       Visit Diagnoses:    ICD-10-CM ICD-9-CM   1. Pain of left hip  M25.552 719.45       Subjective     Patient states that she is continues to have pain in the left hip, especially when she has been standing and walking for longer periods of time.  She feels better for short period of time after stretching after therapy sessions although the overall intensity of her symptoms has not changed considerably.    Objective   See Exercise, Manual, and Modality Logs for complete treatment.       Assessment/Plan     Attempted long axis distraction to the left hip in the clinic today in an effort to decrease patient's symptoms throughout the day and for the evening when she is at vacation [employer] standing for long periods of time.  At this point we have attempted to address patient's left hip pain with improving strength and stability of the left femoral acetabular joint and in improving mobility of the joint capsule but these interventions have not given a significant amount of relief.  Patient presents with signs and symptoms most consistent with a possible hip labral pathology and would likely benefit from imaging to determine the extent of soft tissue injury.           Timed:         Manual Therapy:    14     mins  78842;     Therapeutic Exercise:    20     mins  74228;     Neuromuscular Molina:        mins  07485;    Therapeutic Activity:          mins  13459;     Gait Training:           mins  64246;     Ultrasound:          mins  46877;    Ionto                                   mins   98361  Self Care                            mins   25546  [facility]          mins 77822  Electrical Stimulation:         mins  72705    Un-Timed:  Electrical Stimulation:         mins  76596 ( );  Dry Needling          mins self-pay  Traction          mins 51029      Timed Treatment:   34   mins   Total Treatment:     34   mins    Misael Parkinson PT, DPT, Cert. DN  [place] License: [id number]

## 2024-07-31 ENCOUNTER — HOSPITAL ENCOUNTER (OUTPATIENT)
Dept: MRI IMAGING | Facility: HOSPITAL | Age: 59
Discharge: HOME | End: 2024-07-31
Admitting: FAMILY MEDICINE
Payer: COMMERCIAL

## 2024-07-31 DIAGNOSIS — G89.29 CHRONIC LEFT HIP PAIN: ICD-10-CM

## 2024-07-31 DIAGNOSIS — M25.552 CHRONIC LEFT HIP PAIN: ICD-10-CM

## 2024-07-31 PROCEDURE — 73721 MRI JNT OF LWR EXTRE W/O DYE: CPT

## 2024-08-01 ENCOUNTER — TELEPHONE (OUTPATIENT)
Dept: INTERNAL MEDICINE | Facility: CLINIC | Age: 59
End: 2024-08-01

## 2024-08-01 DIAGNOSIS — G89.29 CHRONIC LEFT HIP PAIN: Primary | ICD-10-CM

## 2024-08-01 DIAGNOSIS — M25.552 CHRONIC LEFT HIP PAIN: Primary | ICD-10-CM

## 2024-08-01 DIAGNOSIS — M16.12 ARTHRITIS OF LEFT HIP: ICD-10-CM

## 2024-08-01 NOTE — TELEPHONE ENCOUNTER
Caller: Carlota Brian    Relationship: Self    Best call back number: [phone]     What orders are you requesting (i.e. lab or imaging): MRI OF HIP TO CHECK FOR TORN LABRUM      In what timeframe would the patient need to come in: ASAP    Where will you receive your lab/imaging services: WITHIN [place]     Additional notes: PATIENT STATES THE INSURANCE HAS DENIED THE MRI OF HER HIP BECAUSE PCP DID NOT INDICATE A POSSIBLE BREAK OR TEAR. PATIENT STATES PHYSICAL THERAPY HAS DISMISSED HER BECAUSE THEY ARE NO LONGER ABLE TO HELP HER AND SUGGESTED AN MRI TO CHECK FOR A TEAR IN THE LABRUM.

## 2024-08-02 ENCOUNTER — TELEPHONE (OUTPATIENT)
Dept: INTERNAL MEDICINE | Facility: CLINIC | Age: 59
End: 2024-08-02

## 2024-08-02 NOTE — TELEPHONE ENCOUNTER
LVM  ADVISING PT SHE CAN SKIP APPT WITH US AND GO TO ORTHO, IF SHE WOULD LIKE TO. ADVISED HER TO CALL THE OFFICE TO CANCEL APPT, IF SHE WOULD LIKE TO DO THIS. ALSO ADVISED SHE CALL US BACK IN 2 WEEKS IF SHE HAS NOT HEARD ANYTHING FROM ORTHO OFFICE IN 2 WEEKS.

## 2024-08-02 NOTE — TELEPHONE ENCOUNTER
Pt informed. She is asking if she needs to come in at her appt today at 245 to discuss the referral to Ortho or can she cancel and just go to the Ortho appt when scheduled?     Please advise.

## 2024-08-02 NOTE — TELEPHONE ENCOUNTER
----- Message from Anh Ayala sent at 8/1/2024  6:04 PM EDT -----  Please call the patient regarding her abnormal result.  Moderate to severe left hip arthritis on MRI.  Ortho referral will be made.

## 2024-08-02 NOTE — TELEPHONE ENCOUNTER
Caller: Carlota Brian    Relationship: Self    Best call back number: [phone]     What is the medical concern/diagnosis: CHRONIC LEFT HIP PAIN    What specialty or service is being requested: ORTHO SURGERY     What is the provider, practice or medical service name: DR. LUISA COURTNEY    What is the office location: [address] [place] [address], [address]    What is the office phone number: [phone] AND FAX NUMBER [phone]    Any additional details:

## 2024-09-09 ENCOUNTER — TELEPHONE (OUTPATIENT)
Dept: INTERNAL MEDICINE | Facility: CLINIC | Age: 59
End: 2024-09-09
Payer: COMMERCIAL

## 2024-09-10 ENCOUNTER — TELEPHONE (OUTPATIENT)
Dept: INTERNAL MEDICINE | Facility: CLINIC | Age: 59
End: 2024-09-10
Payer: COMMERCIAL

## 2024-09-10 NOTE — TELEPHONE ENCOUNTER
Caller: Carlota Brian    Relationship: Self    Best call back number:[phone]     What is the best time to reach you: ANYTIME OK TO LEAVE MESSAGE    Who are you requesting to speak with (clinical staff, provider,  specific staff member): CLINICAL STAFF    What was the call regarding: [facility] IMAGES DOES NOT SHOW HIP AND THE PATIENT DOES NOT UNDERSTAND WHAT THE NUMBERS MEAN.

## 2024-10-07 ENCOUNTER — TELEMEDICINE (OUTPATIENT)
Dept: FAMILY MEDICINE CLINIC | Facility: TELEHEALTH | Age: 59
End: 2024-10-07
Payer: COMMERCIAL

## 2024-10-07 VITALS — HEART RATE: 75 BPM | TEMPERATURE: 98 F

## 2024-10-07 DIAGNOSIS — J01.40 ACUTE NON-RECURRENT PANSINUSITIS: Primary | ICD-10-CM

## 2024-10-07 PROCEDURE — 99213 OFFICE O/P EST LOW 20 MIN: CPT | Performed by: NURSE PRACTITIONER

## 2024-10-07 RX ORDER — CEFDINIR 300 MG/1
300 CAPSULE ORAL 2 TIMES DAILY
Qty: 20 CAPSULE | Refills: 0 | Status: SHIPPED | OUTPATIENT
Start: 2024-10-07 | End: 2024-10-17

## 2024-11-07 ENCOUNTER — OFFICE VISIT (OUTPATIENT)
Dept: INTERNAL MEDICINE | Facility: CLINIC | Age: 59
End: 2024-11-07
Payer: COMMERCIAL

## 2024-11-07 VITALS
WEIGHT: 152 LBS | SYSTOLIC BLOOD PRESSURE: 116 MMHG | TEMPERATURE: 97.7 F | HEIGHT: 68 IN | DIASTOLIC BLOOD PRESSURE: 80 MMHG | BODY MASS INDEX: 23.04 KG/M2 | HEART RATE: 82 BPM | OXYGEN SATURATION: 98 %

## 2024-11-07 DIAGNOSIS — E03.9 ACQUIRED HYPOTHYROIDISM: ICD-10-CM

## 2024-11-07 DIAGNOSIS — Z00.00 ANNUAL PHYSICAL EXAM: Primary | ICD-10-CM

## 2024-11-07 DIAGNOSIS — E55.9 VITAMIN D DEFICIENCY: ICD-10-CM

## 2024-11-07 LAB
BILIRUB BLD-MCNC: NEGATIVE MG/DL
CLARITY, POC: CLEAR
COLOR UR: YELLOW
EXPIRATION DATE: NORMAL
GLUCOSE UR STRIP-MCNC: NEGATIVE MG/DL
KETONES UR QL: NEGATIVE
LEUKOCYTE EST, POC: NEGATIVE
Lab: NORMAL
NITRITE UR-MCNC: NEGATIVE MG/ML
PH UR: 6.5 [PH] (ref 5–8)
PROT UR STRIP-MCNC: NEGATIVE MG/DL
RBC # UR STRIP: NEGATIVE /UL
SP GR UR: 1.01 (ref 1–1.03)
UROBILINOGEN UR QL: NORMAL

## 2024-11-07 PROCEDURE — 81003 URINALYSIS AUTO W/O SCOPE: CPT | Performed by: FAMILY MEDICINE

## 2024-11-07 PROCEDURE — 99396 PREV VISIT EST AGE 40-64: CPT | Performed by: FAMILY MEDICINE

## 2024-11-07 RX ORDER — LEVOTHYROXINE SODIUM 88 UG/1
88 TABLET ORAL DAILY
Qty: 180 TABLET | Refills: 1 | Status: SHIPPED | OUTPATIENT
Start: 2024-11-07

## 2024-11-07 NOTE — PROGRESS NOTES
"Subjective   Carlota Brian is a 59 y.o. female.     Chief Complaint   Patient presents with    Annual Exam       Visit Vitals  /80 (BP Location: Right arm, Patient Position: Sitting, Cuff Size: Adult)   Pulse 82   Temp 97.7 °F (36.5 °C)   Ht 173.5 cm (68.3\")   Wt 68.9 kg (152 lb)   SpO2 98%   BMI 22.91 kg/m²       Wt Readings from Last 3 Encounters:   11/07/24 68.9 kg (152 lb)   05/02/24 68 kg (150 lb)   04/25/24 68.9 kg (151 lb 12.8 oz)         Hypothyroidism  Presents for follow-up visit. Patient reports no anxiety, cold intolerance, constipation, depressed mood, diaphoresis, diarrhea, dry skin, fatigue, hair loss, heat intolerance, hoarse voice, leg swelling, menstrual problem, nail problem, palpitations, tremors, visual change, weight gain, weight loss, trouble swallowing, globus sensation or neck mass. The symptoms have been stable. Past treatments include levothyroxine.        Pt here for annual exam.   Pt has labral tear in left hip and has seen Dr Milian and is taking curcumin and feeling better.     Pt recently in [place], and had sinus congestion which has persisted.   Pt also have fever and chills and muscle aches last week.     The following portions of the patient's history were reviewed and updated as appropriate: allergies, current medications, past family history, past medical history, past social history, past surgical history, and problem list.    Past Medical History:   Diagnosis Date    Allergic Always    Asthma Childhood    Basal cell carcinoma (BCC) of forehead 2008    left removed by Mohs    Family history of aortic aneurysm     Dad and P uncles and P aunt:  thoracic ascending    Hypothyroidism     Menopause     Plantar fasciitis     Seasonal allergies     Type B blood, Rh positive 10/31/2019    Urinary tract infection Once    Vitamin D deficiency 05/10/2018      Past Surgical History:   Procedure Laterality Date    COLONOSCOPY      age 48 in [place]    COLONOSCOPY W/ POLYPECTOMY " " 05/23/2024    Dr Roy, hyperplastic polyp x2    EAR TUBES Bilateral     MOHS SURGERY Left 2008    ft forehead BCCA      Family History   Problem Relation Age of Onset    Stroke Mother         brain bleed    Thyroid disease Mother     Coronary artery disease Mother     Hypertension Mother     Hyperlipidemia Mother     Scleroderma Mother     Dumont's esophagus Mother     Peripheral vascular disease Mother     Hepatitis Mother         ? hep C from transfusion    Fibromyalgia Mother     Aneurysm Father     Thyroid disease Sister     Seizures Sister     Autism Son     Heart attack Paternal Aunt     Aneurysm Paternal Aunt     Dementia Paternal Aunt     Lung cancer Paternal Aunt     Heart attack Paternal Uncle     Aneurysm Paternal Uncle     Heart attack Paternal Uncle     No Known Problems Paternal Grandmother     Heart attack Paternal Grandfather       Social History     Socioeconomic History    Marital status:    Tobacco Use    Smoking status: Never    Smokeless tobacco: Never   Vaping Use    Vaping status: Never Used   Substance and Sexual Activity    Alcohol use: Yes     Alcohol/week: 1.0 standard drink of alcohol     Types: 1 Drinks containing 0.5 oz of alcohol per week     Comment: occ    Drug use: No    Sexual activity: Yes     Partners: Male     Birth control/protection: Post-menopausal     Comment:       Allergies   Allergen Reactions    Blue Dyes (Parenteral) Anaphylaxis    Penicillins Other (See Comments)     Patient states that \"cillins\" do not work for her.    Azithromycin Rash     Zpack breaks out in rash       Review of Systems   Constitutional: Negative.  Negative for activity change, appetite change, chills, diaphoresis, fatigue, fever, unexpected weight change, weight gain and weight loss.   HENT:  Positive for sinus pain. Negative for congestion, dental problem, drooling, ear discharge, ear pain, facial swelling, hearing loss, hoarse voice, mouth sores, nosebleeds, postnasal drip, " rhinorrhea, sinus pressure, sneezing, sore throat, tinnitus, trouble swallowing and voice change.    Eyes: Negative.  Negative for photophobia, pain, discharge, redness, itching and visual disturbance.   Respiratory: Negative.  Negative for apnea, cough, choking, chest tightness, shortness of breath, wheezing and stridor.    Cardiovascular: Negative.  Negative for chest pain, palpitations and leg swelling.   Gastrointestinal: Negative.  Negative for abdominal distention, abdominal pain, anal bleeding, blood in stool, constipation, diarrhea, nausea, rectal pain and vomiting.   Endocrine: Negative.  Negative for cold intolerance, heat intolerance, polydipsia, polyphagia and polyuria.   Genitourinary: Negative.  Negative for decreased urine volume, difficulty urinating, dyspareunia, dysuria, enuresis, flank pain, frequency, genital sores, hematuria, menstrual problem, pelvic pain, urgency, vaginal bleeding, vaginal discharge and vaginal pain.   Musculoskeletal:  Positive for arthralgias (left hip) and gait problem (improved). Negative for back pain, joint swelling, myalgias, neck pain and neck stiffness.   Skin: Negative.  Negative for color change, pallor, rash and wound.   Allergic/Immunologic: Negative.  Negative for environmental allergies, food allergies and immunocompromised state.   Neurological:  Negative for dizziness, tremors, seizures, syncope, facial asymmetry, speech difficulty, weakness, light-headedness, numbness and headaches.   Hematological: Negative.  Negative for adenopathy. Does not bruise/bleed easily.   Psychiatric/Behavioral: Negative.  Negative for agitation, behavioral problems, confusion, decreased concentration, dysphoric mood, hallucinations, self-injury, sleep disturbance and suicidal ideas. The patient is not nervous/anxious and is not hyperactive.          PHQ-2 Depression Screening  Little interest or pleasure in doing things? Not at all   Feeling down, depressed, or hopeless? Not at  all   PHQ-2 Total Score 0       Objective   Physical Exam  Vitals and nursing note reviewed.   Constitutional:       General: She is not in acute distress.     Appearance: She is well-developed. She is not diaphoretic.   HENT:      Head: Normocephalic and atraumatic.      Right Ear: Ear canal and external ear normal. Tympanic membrane is retracted.      Left Ear: Ear canal and external ear normal. Tympanic membrane is retracted.      Nose: Nose normal.      Mouth/Throat:      Mouth: Mucous membranes are not pale, not dry and not cyanotic.      Pharynx: Uvula midline. No oropharyngeal exudate or posterior oropharyngeal erythema.   Eyes:      General: Lids are normal. No scleral icterus.        Right eye: No foreign body, discharge or hordeolum.         Left eye: No foreign body, discharge or hordeolum.      Extraocular Movements:      Right eye: Normal extraocular motion and no nystagmus.      Left eye: Normal extraocular motion and no nystagmus.      Conjunctiva/sclera: Conjunctivae normal.      Right eye: Right conjunctiva is not injected. No chemosis, exudate or hemorrhage.     Left eye: Left conjunctiva is not injected. No chemosis, exudate or hemorrhage.     Pupils: Pupils are equal, round, and reactive to light.   Neck:      Thyroid: No thyroid mass or thyromegaly.      Vascular: No carotid bruit.      Trachea: Trachea normal. No tracheal deviation.   Cardiovascular:      Rate and Rhythm: Normal rate and regular rhythm.      Pulses:           Dorsalis pedis pulses are 2+ on the right side and 2+ on the left side.        Posterior tibial pulses are 2+ on the right side and 2+ on the left side.      Heart sounds: Normal heart sounds. No murmur heard.     No friction rub. No gallop.   Pulmonary:      Effort: Pulmonary effort is normal. No respiratory distress.      Breath sounds: Normal breath sounds. No decreased breath sounds, wheezing, rhonchi or rales.   Chest:      Chest wall: No deformity or tenderness.  There is no dullness to percussion.   Breasts:     Jamie Score is 5.      Breasts are symmetrical.      Right: Normal. No swelling, bleeding, inverted nipple, mass, nipple discharge, skin change or tenderness.      Left: Normal. No swelling, bleeding, inverted nipple, mass, nipple discharge, skin change or tenderness.   Abdominal:      General: Bowel sounds are normal. There is no distension.      Palpations: Abdomen is soft. There is no hepatomegaly, splenomegaly or mass.      Tenderness: There is no abdominal tenderness. There is no guarding or rebound.      Hernia: No hernia is present.   Musculoskeletal:         General: No tenderness or deformity. Normal range of motion.      Cervical back: Normal range of motion and neck supple.      Right lower leg: No edema.      Left lower leg: No edema.   Lymphadenopathy:      Head:      Right side of head: No submandibular adenopathy.      Left side of head: No submandibular adenopathy.      Cervical: No cervical adenopathy.      Right cervical: No superficial, deep or posterior cervical adenopathy.     Left cervical: No superficial, deep or posterior cervical adenopathy.      Upper Body:      Right upper body: No supraclavicular, axillary or pectoral adenopathy.      Left upper body: No supraclavicular, axillary or pectoral adenopathy.   Skin:     General: Skin is warm and dry.      Findings: No rash.      Nails: There is no clubbing.   Neurological:      Mental Status: She is alert and oriented to person, place, and time.      Cranial Nerves: No cranial nerve deficit.      Sensory: No sensory deficit.      Coordination: Coordination normal.      Deep Tendon Reflexes: Reflexes are normal and symmetric.      Reflex Scores:       Bicep reflexes are 2+ on the right side and 2+ on the left side.       Brachioradialis reflexes are 2+ on the right side and 2+ on the left side.       Patellar reflexes are 2+ on the right side and 2+ on the left side.  Psychiatric:          Attention and Perception: Attention normal.         Mood and Affect: Mood and affect normal.         Speech: Speech normal.         Behavior: Behavior normal.         Thought Content: Thought content normal.         Cognition and Memory: Cognition and memory normal.         Judgment: Judgment normal.         Assessment & Plan   Problems Addressed this Visit          Endocrine and Metabolic    Hypothyroidism    Relevant Medications    levothyroxine (SYNTHROID, LEVOTHROID) 88 MCG tablet    Other Relevant Orders    TSH    T4, Free    Vitamin D deficiency    Relevant Orders    Vitamin D,25-Hydroxy     Other Visit Diagnoses       Annual physical exam    -  Primary    Relevant Orders    Comprehensive Metabolic Panel    Lipid Panel    CBC & Differential    TSH          Diagnoses         Codes Comments    Annual physical exam    -  Primary ICD-10-CM: Z00.00  ICD-9-CM: V70.0     Acquired hypothyroidism     ICD-10-CM: E03.9  ICD-9-CM: 244.9     Vitamin D deficiency     ICD-10-CM: E55.9  ICD-9-CM: 268.9           Pt declines Covid vaccine, shingrix, influenza, until she feels better.              Current Outpatient Medications:     albuterol sulfate  (90 Base) MCG/ACT inhaler, Inhale 2 puffs Every 4 (Four) Hours As Needed for Wheezing., Disp: 18 g, Rfl: 0    alendronate (Fosamax) 70 MG tablet, Take 1 tablet by mouth Every 7 (Seven) Days., Disp: 12 tablet, Rfl: 3    Calcium Carbonate 1500 (600 Ca) MG tablet, Take 1 tablet by mouth Daily., Disp: , Rfl:     Cholecalciferol (VITAMIN D3) 2000 units capsule, Take 1 capsule by mouth Daily., Disp: , Rfl:     ELDERBERRY PO, Take  by mouth., Disp: , Rfl:     levocetirizine (XYZAL) 5 MG tablet, TAKE 1 TABLET BY MOUTH DAILY, Disp: 90 tablet, Rfl: 3    levothyroxine (SYNTHROID, LEVOTHROID) 88 MCG tablet, Take 1 tablet by mouth Daily., Disp: 180 tablet, Rfl: 1    multivitamin with minerals tablet tablet, Take 1 tablet by mouth Daily., Disp: , Rfl:     Unable to find, 1 each 1 (One)  Time. Compounded estradiol vaginal cream 0.1 mg/gm 0.5 grams by vaginal route 2 X week., Disp: , Rfl:     Return in about 6 months (around 5/7/2025), or if symptoms worsen or fail to improve, for Recheck.    Please follow a low animal fat diet that is also low in sugar, low in junk food, low in sweet drinks and low in alcohol.  Please increase the amount of fiber in your diet as well as increasing your daily exercise, such as walking.

## 2024-12-04 ENCOUNTER — OFFICE VISIT (OUTPATIENT)
Dept: INTERNAL MEDICINE | Facility: CLINIC | Age: 59
End: 2024-12-04
Payer: COMMERCIAL

## 2024-12-04 VITALS
BODY MASS INDEX: 23.34 KG/M2 | DIASTOLIC BLOOD PRESSURE: 84 MMHG | HEART RATE: 81 BPM | TEMPERATURE: 98.2 F | HEIGHT: 68 IN | SYSTOLIC BLOOD PRESSURE: 138 MMHG | WEIGHT: 154 LBS | OXYGEN SATURATION: 97 %

## 2024-12-04 DIAGNOSIS — J06.9 UPPER RESPIRATORY TRACT INFECTION, UNSPECIFIED TYPE: ICD-10-CM

## 2024-12-04 DIAGNOSIS — R05.9 COUGH, UNSPECIFIED TYPE: Primary | ICD-10-CM

## 2024-12-04 LAB
EXPIRATION DATE: NORMAL
FLUAV AG UPPER RESP QL IA.RAPID: NOT DETECTED
FLUBV AG UPPER RESP QL IA.RAPID: NOT DETECTED
INTERNAL CONTROL: NORMAL
Lab: NORMAL
SARS-COV-2 AG UPPER RESP QL IA.RAPID: NOT DETECTED

## 2024-12-04 PROCEDURE — 87428 SARSCOV & INF VIR A&B AG IA: CPT | Performed by: FAMILY MEDICINE

## 2024-12-04 PROCEDURE — 99213 OFFICE O/P EST LOW 20 MIN: CPT | Performed by: FAMILY MEDICINE

## 2024-12-04 RX ORDER — ALBUTEROL SULFATE 90 UG/1
2 INHALANT RESPIRATORY (INHALATION) EVERY 4 HOURS PRN
Qty: 18 G | Refills: 0 | Status: SHIPPED | OUTPATIENT
Start: 2024-12-04

## 2024-12-04 RX ORDER — DOXYCYCLINE 100 MG/1
100 CAPSULE ORAL 2 TIMES DAILY
Qty: 20 CAPSULE | Refills: 0 | Status: SHIPPED | OUTPATIENT
Start: 2024-12-04

## 2024-12-04 RX ORDER — BROMPHENIRAMINE MALEATE, PSEUDOEPHEDRINE HYDROCHLORIDE, AND DEXTROMETHORPHAN HYDROBROMIDE 2; 30; 10 MG/5ML; MG/5ML; MG/5ML
5 SYRUP ORAL 4 TIMES DAILY PRN
Qty: 118 ML | Refills: 1 | Status: SHIPPED | OUTPATIENT
Start: 2024-12-04

## 2024-12-04 NOTE — PROGRESS NOTES
"Subjective   Carlota Brian is a 59 y.o. female.     Chief Complaint   Patient presents with    Cough     Cough, nasal drainage sneezing. 4 days. No fever or chills.  History of pneumonia       Visit Vitals  /84 (BP Location: Left arm, Patient Position: Sitting, Cuff Size: Adult)   Pulse 81   Temp 98.2 °F (36.8 °C)   Ht 173.5 cm (68.3\")   Wt 69.9 kg (154 lb)   SpO2 97%   BMI 23.21 kg/m²         Cough  This is a new problem. The current episode started in the past 7 days. The problem has been unchanged. The problem occurs constantly. The cough is Productive of yellow sputum. Associated symptoms include nasal congestion, postnasal drip, rhinorrhea, a sore throat (improved) and wheezing. Pertinent negatives include no chest pain, chills, ear congestion, ear pain, fever, headaches, heartburn, hemoptysis, myalgias, rash, shortness of breath, sweats or weight loss. Nothing aggravates the symptoms. She has tried rest for the symptoms. The treatment provided no relief. Her past medical history is significant for asthma.        The following portions of the patient's history were reviewed and updated as appropriate: allergies, current medications, past family history, past medical history, past social history, past surgical history, and problem list.    Past Medical History:   Diagnosis Date    Allergic Always    Asthma Childhood    Basal cell carcinoma (BCC) of forehead 2008    left removed by Mohs    Family history of aortic aneurysm     Dad and P uncles and P aunt:  thoracic ascending    Hypothyroidism     Menopause     Plantar fasciitis     Seasonal allergies     Type B blood, Rh positive 10/31/2019    Urinary tract infection Once    Vitamin D deficiency 05/10/2018      Past Surgical History:   Procedure Laterality Date    COLONOSCOPY      age 48 in [place]    COLONOSCOPY W/ POLYPECTOMY  05/23/2024    Dr Roy, hyperplastic polyp x2    EAR TUBES Bilateral     MOHS SURGERY Left 2008    ft forehead BCCA      Family " "History   Problem Relation Age of Onset    Stroke Mother         brain bleed    Thyroid disease Mother     Coronary artery disease Mother     Hypertension Mother     Hyperlipidemia Mother     Scleroderma Mother     Dumont's esophagus Mother     Peripheral vascular disease Mother     Hepatitis Mother         ? hep C from transfusion    Fibromyalgia Mother     Aneurysm Father     Thyroid disease Sister     Seizures Sister     Autism Son     Heart attack Paternal Aunt     Aneurysm Paternal Aunt     Dementia Paternal Aunt     Lung cancer Paternal Aunt     Heart attack Paternal Uncle     Aneurysm Paternal Uncle     Heart attack Paternal Uncle     No Known Problems Paternal Grandmother     Heart attack Paternal Grandfather       Social History     Socioeconomic History    Marital status:    Tobacco Use    Smoking status: Never    Smokeless tobacco: Never   Vaping Use    Vaping status: Never Used   Substance and Sexual Activity    Alcohol use: Yes     Alcohol/week: 1.0 standard drink of alcohol     Types: 1 Drinks containing 0.5 oz of alcohol per week     Comment: occ    Drug use: No    Sexual activity: Yes     Partners: Male     Birth control/protection: Post-menopausal     Comment:       Allergies   Allergen Reactions    Blue Dyes (Parenteral) Anaphylaxis    Penicillins Other (See Comments)     Patient states that \"cillins\" do not work for her.  Pt is able to take cephalosporins    Azithromycin Rash     Zpack breaks out in rash       Review of Systems   Constitutional:  Negative for chills, fever and weight loss.   HENT:  Positive for postnasal drip, rhinorrhea and sore throat (improved). Negative for ear pain.    Respiratory:  Positive for cough and wheezing. Negative for hemoptysis and shortness of breath.    Cardiovascular:  Negative for chest pain.   Gastrointestinal:  Negative for heartburn.   Musculoskeletal:  Negative for myalgias.   Skin:  Negative for rash.   Neurological:  Negative for headaches. "       Objective   Physical Exam  Vitals and nursing note reviewed.   Constitutional:       Appearance: She is well-developed.   HENT:      Head: Normocephalic.      Right Ear: External ear normal.      Left Ear: External ear normal.      Nose: Nose normal. Congestion and rhinorrhea present.      Right Sinus: No maxillary sinus tenderness or frontal sinus tenderness.      Left Sinus: No maxillary sinus tenderness or frontal sinus tenderness.   Eyes:      General: Lids are normal.      Conjunctiva/sclera: Conjunctivae normal.      Pupils: Pupils are equal, round, and reactive to light.   Neck:      Thyroid: No thyroid mass or thyromegaly.      Vascular: No carotid bruit.      Trachea: Trachea normal.   Cardiovascular:      Rate and Rhythm: Normal rate and regular rhythm.      Heart sounds: No murmur heard.  Pulmonary:      Effort: Pulmonary effort is normal. No respiratory distress.      Breath sounds: Normal breath sounds. No decreased breath sounds, wheezing, rhonchi or rales.   Chest:      Chest wall: No tenderness.   Abdominal:      General: Bowel sounds are normal.      Palpations: Abdomen is soft.      Tenderness: There is no abdominal tenderness.   Musculoskeletal:         General: Normal range of motion.      Cervical back: Normal range of motion and neck supple.   Skin:     General: Skin is warm and dry.   Neurological:      Mental Status: She is alert and oriented to person, place, and time.   Psychiatric:         Behavior: Behavior normal.         Assessment & Plan   Problems Addressed this Visit    None  Visit Diagnoses       Cough, unspecified type    -  Primary    Relevant Medications    brompheniramine-pseudoephedrine-DM 30-2-10 MG/5ML syrup    doxycycline (MONODOX) 100 MG capsule    Other Relevant Orders    POCT SARS-CoV-2 Antigen ROSENDO + Flu (Completed)    Upper respiratory tract infection, unspecified type        Relevant Medications    doxycycline (MONODOX) 100 MG capsule    albuterol sulfate   (90 Base) MCG/ACT inhaler          Diagnoses         Codes Comments    Cough, unspecified type    -  Primary ICD-10-CM: R05.9  ICD-9-CM: 786.2     Upper respiratory tract infection, unspecified type     ICD-10-CM: J06.9  ICD-9-CM: 465.9                        Current Outpatient Medications:     albuterol sulfate  (90 Base) MCG/ACT inhaler, Inhale 2 puffs Every 4 (Four) Hours As Needed for Wheezing., Disp: 18 g, Rfl: 0    alendronate (Fosamax) 70 MG tablet, Take 1 tablet by mouth Every 7 (Seven) Days., Disp: 12 tablet, Rfl: 3    Calcium Carbonate 1500 (600 Ca) MG tablet, Take 1 tablet by mouth Daily., Disp: , Rfl:     Cholecalciferol (VITAMIN D3) 2000 units capsule, Take 1 capsule by mouth Daily., Disp: , Rfl:     ELDERBERRY PO, Take  by mouth., Disp: , Rfl:     levocetirizine (XYZAL) 5 MG tablet, TAKE 1 TABLET BY MOUTH DAILY, Disp: 90 tablet, Rfl: 3    levothyroxine (SYNTHROID, LEVOTHROID) 88 MCG tablet, Take 1 tablet by mouth Daily., Disp: 180 tablet, Rfl: 1    multivitamin with minerals tablet tablet, Take 1 tablet by mouth Daily., Disp: , Rfl:     Unable to find, 1 each 1 (One) Time. Compounded estradiol vaginal cream 0.1 mg/gm 0.5 grams by vaginal route 2 X week., Disp: , Rfl:     brompheniramine-pseudoephedrine-DM 30-2-10 MG/5ML syrup, Take 5 mL by mouth 4 (Four) Times a Day As Needed for Congestion or Cough., Disp: 118 mL, Rfl: 1    doxycycline (MONODOX) 100 MG capsule, Take 1 capsule by mouth 2 (Two) Times a Day., Disp: 20 capsule, Rfl: 0    Return if symptoms worsen or fail to improve, for Recheck.    Recent Results (from the past week)   POCT SARS-CoV-2 Antigen ROSENDO + Flu    Collection Time: 12/04/24  3:17 PM    Specimen: Swab   Result Value Ref Range    SARS Antigen Not Detected Not Detected, Presumptive Negative    Influenza A Antigen ROSENDO Not Detected Not Detected    Influenza B Antigen ROSENDO Not Detected Not Detected    Internal Control Passed Passed    Lot Number [id number]     Expiration Date  11/1/2025         normal...

## 2024-12-27 ENCOUNTER — TELEPHONE (OUTPATIENT)
Dept: INTERNAL MEDICINE | Facility: CLINIC | Age: 59
End: 2024-12-27
Payer: COMMERCIAL

## 2024-12-27 NOTE — TELEPHONE ENCOUNTER
Caller: Carlota Brian    Relationship: Self    Best call back number: [phone]     What form or medical record are you requesting: MRI RESULTS FROM LEFT HIP AND ANY APPOINTMENT NOTES REGARDING LEFT HIP ISSUES    Who is requesting this form or medical record from you: PAIN PROVIDER REFERRED BY     How would you like to receive the form or medical records (pick-up, mail, fax):    PLEASE CALL PATIENT WHEN THIS IS READY FOR .     Timeframe paperwork needed: BEFORE APPOINTMENT 1/16

## 2025-01-02 ENCOUNTER — TELEPHONE (OUTPATIENT)
Dept: OBSTETRICS AND GYNECOLOGY | Facility: CLINIC | Age: 60
End: 2025-01-02
Payer: COMMERCIAL

## 2025-01-02 ENCOUNTER — LAB (OUTPATIENT)
Dept: INTERNAL MEDICINE | Facility: CLINIC | Age: 60
End: 2025-01-02
Payer: COMMERCIAL

## 2025-01-02 DIAGNOSIS — E03.9 ACQUIRED HYPOTHYROIDISM: ICD-10-CM

## 2025-01-02 DIAGNOSIS — E55.9 VITAMIN D DEFICIENCY: ICD-10-CM

## 2025-01-02 DIAGNOSIS — Z00.00 ANNUAL PHYSICAL EXAM: ICD-10-CM

## 2025-01-02 LAB
25(OH)D3 SERPL-MCNC: 53.5 NG/ML (ref 30–100)
ALBUMIN SERPL-MCNC: 4.2 G/DL (ref 3.5–5.2)
ALBUMIN/GLOB SERPL: 1.4 G/DL
ALP SERPL-CCNC: 65 U/L (ref 39–117)
ALT SERPL W P-5'-P-CCNC: 16 U/L (ref 1–33)
ANION GAP SERPL CALCULATED.3IONS-SCNC: 9.6 MMOL/L (ref 5–15)
AST SERPL-CCNC: 22 U/L (ref 1–32)
BASOPHILS # BLD AUTO: 0.02 10*3/MM3 (ref 0–0.2)
BASOPHILS NFR BLD AUTO: 0.3 % (ref 0–1.5)
BILIRUB SERPL-MCNC: 0.4 MG/DL (ref 0–1.2)
BUN SERPL-MCNC: 13 MG/DL (ref 8–23)
BUN/CREAT SERPL: 12.7 (ref 7–25)
CALCIUM SPEC-SCNC: 9.2 MG/DL (ref 8.6–10.5)
CHLORIDE SERPL-SCNC: 103 MMOL/L (ref 98–107)
CHOLEST SERPL-MCNC: 212 MG/DL (ref 0–200)
CO2 SERPL-SCNC: 26.4 MMOL/L (ref 22–29)
CREAT SERPL-MCNC: 1.02 MG/DL (ref 0.57–1)
DEPRECATED RDW RBC AUTO: 38.3 FL (ref 37–54)
EGFRCR SERPLBLD CKD-EPI 2021: 62.7 ML/MIN/1.73
EOSINOPHIL # BLD AUTO: 0.08 10*3/MM3 (ref 0–0.4)
EOSINOPHIL NFR BLD AUTO: 1.3 % (ref 0.3–6.2)
ERYTHROCYTE [DISTWIDTH] IN BLOOD BY AUTOMATED COUNT: 12 % (ref 12.3–15.4)
GLOBULIN UR ELPH-MCNC: 3.1 GM/DL
GLUCOSE SERPL-MCNC: 78 MG/DL (ref 65–99)
HCT VFR BLD AUTO: 40.3 % (ref 34–46.6)
HDLC SERPL-MCNC: 85 MG/DL (ref 40–60)
HGB BLD-MCNC: 14 G/DL (ref 12–15.9)
IMM GRANULOCYTES # BLD AUTO: 0.01 10*3/MM3 (ref 0–0.05)
IMM GRANULOCYTES NFR BLD AUTO: 0.2 % (ref 0–0.5)
LDLC SERPL CALC-MCNC: 117 MG/DL (ref 0–100)
LDLC/HDLC SERPL: 1.36 {RATIO}
LYMPHOCYTES # BLD AUTO: 2.72 10*3/MM3 (ref 0.7–3.1)
LYMPHOCYTES NFR BLD AUTO: 45.3 % (ref 19.6–45.3)
MCH RBC QN AUTO: 31 PG (ref 26.6–33)
MCHC RBC AUTO-ENTMCNC: 34.7 G/DL (ref 31.5–35.7)
MCV RBC AUTO: 89.4 FL (ref 79–97)
MONOCYTES # BLD AUTO: 0.34 10*3/MM3 (ref 0.1–0.9)
MONOCYTES NFR BLD AUTO: 5.7 % (ref 5–12)
NEUTROPHILS NFR BLD AUTO: 2.84 10*3/MM3 (ref 1.7–7)
NEUTROPHILS NFR BLD AUTO: 47.2 % (ref 42.7–76)
NRBC BLD AUTO-RTO: 0 /100 WBC (ref 0–0.2)
PLATELET # BLD AUTO: 292 10*3/MM3 (ref 140–450)
PMV BLD AUTO: 10.8 FL (ref 6–12)
POTASSIUM SERPL-SCNC: 4 MMOL/L (ref 3.5–5.2)
PROT SERPL-MCNC: 7.3 G/DL (ref 6–8.5)
RBC # BLD AUTO: 4.51 10*6/MM3 (ref 3.77–5.28)
SODIUM SERPL-SCNC: 139 MMOL/L (ref 136–145)
T4 FREE SERPL-MCNC: 1.51 NG/DL (ref 0.92–1.68)
TRIGL SERPL-MCNC: 58 MG/DL (ref 0–150)
TSH SERPL DL<=0.05 MIU/L-ACNC: 1.17 UIU/ML (ref 0.27–4.2)
VLDLC SERPL-MCNC: 10 MG/DL (ref 5–40)
WBC NRBC COR # BLD AUTO: 6.01 10*3/MM3 (ref 3.4–10.8)

## 2025-01-02 PROCEDURE — 84439 ASSAY OF FREE THYROXINE: CPT | Performed by: FAMILY MEDICINE

## 2025-01-02 PROCEDURE — 80061 LIPID PANEL: CPT | Performed by: FAMILY MEDICINE

## 2025-01-02 PROCEDURE — 82306 VITAMIN D 25 HYDROXY: CPT | Performed by: FAMILY MEDICINE

## 2025-01-02 PROCEDURE — 80050 GENERAL HEALTH PANEL: CPT | Performed by: FAMILY MEDICINE

## 2025-01-02 PROCEDURE — 36415 COLL VENOUS BLD VENIPUNCTURE: CPT | Performed by: FAMILY MEDICINE

## 2025-01-02 NOTE — TELEPHONE ENCOUNTER
Provider:  DR ELIZALDE    Caller:[facility]    Phone Number:[phone]     Reason for Call:THE PATIENT WAS WANTING TO TRANSFER HER COMPOUNDING CREAM FROM HER SPECIALTY [facility] TO [facility] AND SINCE THEY DON'T COMPOUND THEY WAS WONDERING IF THEY CAN GET A NEW PRESCRIPTION WROTE FOR ESTRACE//PLEASE FOLLOW UP

## 2025-01-03 RX ORDER — ESTRADIOL 0.1 MG/G
CREAM VAGINAL
Qty: 42.5 G | Refills: 1 | Status: SHIPPED | OUTPATIENT
Start: 2025-01-03

## 2025-01-03 NOTE — TELEPHONE ENCOUNTER
New prescription sent to [facility] for Estrace vaginal estrogen cream.    Elizabeth Pringle MD

## 2025-03-04 ENCOUNTER — TREATMENT (OUTPATIENT)
Dept: PHYSICAL THERAPY | Facility: CLINIC | Age: 60
End: 2025-03-04
Payer: COMMERCIAL

## 2025-03-04 DIAGNOSIS — M25.552 PAIN OF LEFT HIP: Primary | ICD-10-CM

## 2025-03-04 PROCEDURE — 97161 PT EVAL LOW COMPLEX 20 MIN: CPT | Performed by: PHYSICAL THERAPIST

## 2025-03-04 NOTE — PROGRESS NOTES
Physical Therapy Initial Evaluation and Plan of Care    [facility]    [address], [address] [place] [address]    Patient: Carlota Brian   : 1965  Diagnosis/ICD-10 Code:  Pain of left hip [M25.552]  Referring practitioner: Dillan Marie MD  Date of Initial Visit: 3/4/2025  Today's Date: 3/4/2025  Patient seen for 1 session         Visit Diagnoses:    ICD-10-CM ICD-9-CM   1. Pain of left hip  M25.552 719.45         Subjective Evaluation    History of Present Illness  Mechanism of injury: Pt states that after she completed therapy previously at this location she was diagnosed with left hip arthritis and labral pathology. When she stopped therapy she had some relief with specific activity restricitons, including not walking as much. Over time her symptoms have gradually gotten worse again. She has resumed walking to try to offset some weight gain, she is walking 3x per week for 3 miles. She had PRP injections in the left hip on 2/10/25. She has not noticed any significant difference in her symptom intensity since the injection. Both prior to and after the injections she has good and bad days.     When walking she has constant discomfort, but if she walks slower and focuses on heel to toe pattern she has less discomfort.       Patient Occupation: [employer] - prolonged standing Quality of life: good    Pain  Current pain rating: 3  At best pain ratin  At worst pain ratin  Location: anterior left GH joint  Quality: dull ache, sharp and pressure  Relieving factors: rest and change in position  Aggravating factors: ambulation, prolonged positioning and squatting  Progression: no change    Social Support  Lives in: multiple-level home  Lives with: spouse    Diagnostic Tests  X-ray: abnormal  MRI studies: abnormal    Treatments  Previous treatment: physical therapy and injection treatment  Patient Goals  Patient goals for therapy: decreased pain, increased motion and increased strength                          -   Patient Active Problem List    Diagnosis Date Noted    Plantar fasciitis     Type B blood, Rh positive 10/31/2019    Vitamin D deficiency 05/10/2018    Osteopenia of multiple sites 05/10/2018    Family history of aortic aneurysm      Note Last Updated: 5/10/2018     Dad and uncles thoracic ascending      Fibrocystic breast changes, bilateral 01/30/2018    Menopause 01/30/2018    Seasonal allergies     Hypothyroidism        -   Past Medical History:   Diagnosis Date    Allergic Always    Asthma Childhood    Basal cell carcinoma (BCC) of forehead 2008    left removed by Mohs    Family history of aortic aneurysm     Dad and P uncles and P aunt:  thoracic ascending    Hypothyroidism     Menopause     Plantar fasciitis     Seasonal allergies     Type B blood, Rh positive 10/31/2019    Urinary tract infection Once    Vitamin D deficiency 05/10/2018       -   Past Surgical History:   Procedure Laterality Date    COLONOSCOPY      age 48 in [place]    COLONOSCOPY W/ POLYPECTOMY  05/23/2024    Dr Roy, hyperplastic polyp x2    EAR TUBES Bilateral     MOHS SURGERY Left 2008    ft forehead BCCA        Objective          Palpation     Additional Palpation Details  TTP noted at the left greater trochanter. Hypertonicity and TTP noted at the left post hip musculature.     Passive Range of Motion   Left Hip   Flexion: 70 degrees   Abduction: 36 degrees   External rotation (90/90): 55 degrees   Internal rotation (90/90): 3 degrees     Right Hip   Flexion: 82 degrees   Abduction: 43 degrees   External rotation (90/90): 70 degrees   Internal rotation (90/90): 49 degrees     Strength/Myotome Testing     Left Hip   Planes of Motion   Flexion: 4+  Extension: 4-  Abduction: 4-    Right Hip   Planes of Motion   Flexion: 5  Extension: 4  Abduction: 4    Tests     Left Hip   Positive BERNARDA and scour.     Ambulation     Ambulation: Level Surfaces     Additional Level Surfaces Ambulation Details  Slowed constantine with  ambulation, but otherwise no significant gait deviation is noted.              Assessment & Plan       Assessment  Impairments: abnormal coordination, abnormal gait, abnormal or restricted ROM, activity intolerance, impaired balance, impaired physical strength, lacks appropriate home exercise program and safety issue   Functional limitations: carrying objects, walking and uncomfortable because of pain   Assessment details: Patient is a 59 year old female who comes to physical therapy with c/o pain in the left hip. Signs and symptoms are consistent with left hip OA with associated movement dysfunction and muscle guarding resulting in pain, decreased ROM, decreased strength, and inability to perform all essential functional activities. Pt will benefit from skilled PT services to address the above issues.     Prognosis: good    Goals  Plan Goals: SHORT TERM GOALS:  2 weeks       1. Pt independent with HEP  2. Pt to demonstrate melissa hip strength 4/5 or greater to improve stability with ambulation  3. Pt to report being able to walk for 10 minutes without increasing pain in the left hip    LONG TERM GOALS:   6 weeks  1. Pt to demonstrate ability to perform full functional squat with good form and control of the hips and without increasing pain  2. Pt to demonstrate melissa hip strength to 4+/5 or greater to improve safety with ambulation on uneven surfaces  3. Pt to return to work full duty without increased pain in the left hip(s)   4. Pt to demonstrate ability to perform step up/down 8 inch step x10 safely and without pain in the left hip(s)      Plan  Therapy options: will be seen for skilled therapy services  Planned modality interventions: cryotherapy, dry needling, high voltage pulsed current (pain management), iontophoresis, TENS, thermotherapy (hydrocollator packs) and ultrasound  Planned therapy interventions: abdominal trunk stabilization, ADL retraining, balance/weight-bearing training, body mechanics training,  functional ROM exercises, gait training, home exercise program, joint mobilization, manual therapy, motor coordination training, neuromuscular re-education, postural training, soft tissue mobilization, spinal/joint mobilization, strengthening, stretching and therapeutic activities  Frequency: 2x week  Duration in weeks: 12  Treatment plan discussed with: patient        History # of Personal Factors and/or Comorbidities: LOW (0)  Examination of Body System(s): # of elements: LOW (1-2)  Clinical Presentation: STABLE   Clinical Decision Making: LOW       Timed:         Manual Therapy:         mins  54927;     Therapeutic Exercise:         mins  17415;     Neuromuscular Molina:        mins  31977;    Therapeutic Activity:          mins  55241;     Gait Training:           mins  04976;     Ultrasound:          mins  32463;    Ionto                                   mins   08319  Self Care                            mins   65491  Canalith Repos         mins 83106      Un-Timed:  Electrical Stimulation:         mins  64233 ( );  Dry Needling          mins self-pay  Traction          mins 05723  Low Eval     30     Mins  03357  Mod Eval          Mins  48741  High Eval                            Mins  99508        Timed Treatment:   0   mins   Total Treatment:     30   mins          PT: Misael Parkinson PT, DPT, OCS, Cert. DN   License Number: [id number]  Electronically signed by Misael Parkinson PT, 03/04/25, 10:19 AM EST    Certification Period: 3/4/2025 thru 6/1/2025  I certify that the therapy services are furnished while this patient is under my care.  The services outlined above are required by this patient, and will be reviewed every 90 days.         Physician Signature:__________________________________________________    PHYSICIAN: Dillan Marie MD  NPI: [id number]                                      DATE:      Please sign and return via fax to [email or web address] . Thank you, [facility].

## 2025-03-11 ENCOUNTER — TREATMENT (OUTPATIENT)
Dept: PHYSICAL THERAPY | Facility: CLINIC | Age: 60
End: 2025-03-11
Payer: COMMERCIAL

## 2025-03-11 DIAGNOSIS — M25.552 PAIN OF LEFT HIP: Primary | ICD-10-CM

## 2025-03-11 PROCEDURE — 97112 NEUROMUSCULAR REEDUCATION: CPT | Performed by: PHYSICAL THERAPIST

## 2025-03-11 PROCEDURE — 97140 MANUAL THERAPY 1/> REGIONS: CPT | Performed by: PHYSICAL THERAPIST

## 2025-03-11 PROCEDURE — 97110 THERAPEUTIC EXERCISES: CPT | Performed by: PHYSICAL THERAPIST

## 2025-03-13 ENCOUNTER — TREATMENT (OUTPATIENT)
Dept: PHYSICAL THERAPY | Facility: CLINIC | Age: 60
End: 2025-03-13
Payer: COMMERCIAL

## 2025-03-13 DIAGNOSIS — M25.552 PAIN OF LEFT HIP: Primary | ICD-10-CM

## 2025-03-13 PROCEDURE — 97140 MANUAL THERAPY 1/> REGIONS: CPT | Performed by: PHYSICAL THERAPIST

## 2025-03-13 PROCEDURE — 97112 NEUROMUSCULAR REEDUCATION: CPT | Performed by: PHYSICAL THERAPIST

## 2025-03-13 PROCEDURE — 97110 THERAPEUTIC EXERCISES: CPT | Performed by: PHYSICAL THERAPIST

## 2025-03-14 NOTE — PROGRESS NOTES
Physical Therapy Daily Treatment Note    [facility]   [address], [address] [place] [address]      Patient: Carlota Brian   : 1965  Referring practitioner: Dillan Marie MD  Date of Initial Visit: Type: THERAPY  Noted: 3/4/2025  Today's Date: 3/11/2025  Patient seen for 2 sessions    Certification Period 3/11/2025 thru 2025       Visit Diagnoses:    ICD-10-CM ICD-9-CM   1. Pain of left hip  M25.552 719.45       Subjective     Patient reports that she has not noticed much difference in the pain and discomfort in the left hip since having the injection.  She has not had any increase in pain either, and she has been able to perform light stretching and exercise without having exacerbation of symptoms.    Objective   See Exercise, Manual, and Modality Logs for complete treatment.       Assessment/Plan     At this time it is still too early after the injection to determine if she is likely to have success or not.  Patient has been performing some stretching at home and has started with light exercise as well and has been able to do so without any significant exacerbation of symptoms.  Progress activity in the clinic today with an increase in resistance.  Will assess response at next visit continue to progress as indicated.    Carlota Brian will continue to benefit from skilled physical therapy services to address deficits and continue to work towards reaching functional goals.           Timed:         Manual Therapy:    12     mins  40043;     Therapeutic Exercise:    14     mins  41495;     Neuromuscular Molina:    30    mins  21188;    Therapeutic Activity:          mins  83240;     Gait Training:           mins  67097;     Ultrasound:          mins  02651;    Ionto                                   mins   36033  Self Care                            mins   41539  Canalith Repos         mins 73632  Electrical Stimulation:         mins  42014    Un-Timed:  Electrical Stimulation:         mins   58408 ( );  Dry Needling          mins self-pay  Traction          mins 31195      Timed Treatment:   56   mins   Total Treatment:     56   mins    Misael Parkinson PT, DPT, Cert. DN  [place] License: [id number]

## 2025-03-15 NOTE — PROGRESS NOTES
Physical Therapy Daily Treatment Note    [facility]   [address], [address] [place] [address]      Patient: Carlota Brian   : 1965  Referring practitioner: Dillan Marie MD  Date of Initial Visit: Type: THERAPY  Noted: 3/4/2025  Today's Date: 3/13/2025  Patient seen for 3 sessions    Certification Period 3/13/2025 thru 2025       Visit Diagnoses:    ICD-10-CM ICD-9-CM   1. Pain of left hip  M25.552 719.45       Subjective     Patient states that she feels about the same with the hip.  She has not noted having any increase in pain with performance of exercise in the clinic or with performance of her home exercise program.  She continues to have discomfort in the hip after standing for prolonged periods of time.    Objective   See Exercise, Manual, and Modality Logs for complete treatment.       Assessment/Plan     Continue with progression of activity in the clinic with a focus on hip mobility and bilateral hip strengthening and stabilization.  Will continue to progress activity as indicated.    Carlota Brian will continue to benefit from skilled physical therapy services to address deficits and continue to work towards reaching functional goals.           Timed:         Manual Therapy:    12     mins  09066;     Therapeutic Exercise:    15     mins  92624;     Neuromuscular Molina:    26    mins  86883;    Therapeutic Activity:          mins  70659;     Gait Training:           mins  66697;     Ultrasound:          mins  38853;    Ionto                                   mins   75327  Self Care                            mins   84381  Canalith Repos         mins 88188  Electrical Stimulation:         mins  88537    Un-Timed:  Electrical Stimulation:         mins  52348 (MC );  Dry Needling          mins self-pay  Traction          mins 47916      Timed Treatment:   53   mins   Total Treatment:     53   mins    Misael Parkinson PT, DPT, Cert. DN  [place] License: [id number]

## 2025-03-19 ENCOUNTER — TREATMENT (OUTPATIENT)
Dept: PHYSICAL THERAPY | Facility: CLINIC | Age: 60
End: 2025-03-19
Payer: COMMERCIAL

## 2025-03-19 DIAGNOSIS — M25.552 PAIN OF LEFT HIP: Primary | ICD-10-CM

## 2025-03-19 PROCEDURE — 97140 MANUAL THERAPY 1/> REGIONS: CPT | Performed by: PHYSICAL THERAPIST

## 2025-03-19 PROCEDURE — 97110 THERAPEUTIC EXERCISES: CPT | Performed by: PHYSICAL THERAPIST

## 2025-03-19 PROCEDURE — 97112 NEUROMUSCULAR REEDUCATION: CPT | Performed by: PHYSICAL THERAPIST

## 2025-03-23 NOTE — PROGRESS NOTES
Physical Therapy Daily Treatment Note    [facility]   [address], [address] [place] [address]      Patient: Carlota Brian   : 1965  Referring practitioner: Dillan Marie MD  Date of Initial Visit: Type: THERAPY  Noted: 3/4/2025  Today's Date: 3/19/2025  Patient seen for 4 sessions    Certification Period 3/19/2025 thru 2025       Visit Diagnoses:    ICD-10-CM ICD-9-CM   1. Pain of left hip  M25.552 719.45       Subjective     Patient reports having some increase in soreness and discomfort in the left hip recently, but she still feels like she is improved overall.    Objective   See Exercise, Manual, and Modality Logs for complete treatment.       Assessment/Plan     Decreased intensity resisted exercise in the clinic today and focused mainly on improving hip capsule mobility and pain-free range of motion.  Will continue to progress as indicated.    Carlota Brian will continue to benefit from skilled physical therapy services to address deficits and continue to work towards reaching functional goals.           Timed:         Manual Therapy:    16     mins  10484;     Therapeutic Exercise:    30     mins  04188;     Neuromuscular Molina:    10    mins  19714;    Therapeutic Activity:          mins  76642;     Gait Training:           mins  56413;     Ultrasound:          mins  59113;    Ionto                                   mins   33693  Self Care                            mins   97877  Canalith Repos         mins 41289  Electrical Stimulation:         mins  21363    Un-Timed:  Electrical Stimulation:         mins  93355 ( );  Dry Needling          mins self-pay  Traction          mins 88208      Timed Treatment:   56   mins   Total Treatment:     56   mins    Misael Parkinson PT, DPT, Cert. DN  [place] License: [id number]

## 2025-03-25 ENCOUNTER — TREATMENT (OUTPATIENT)
Dept: PHYSICAL THERAPY | Facility: CLINIC | Age: 60
End: 2025-03-25
Payer: COMMERCIAL

## 2025-03-25 DIAGNOSIS — M25.552 PAIN OF LEFT HIP: Primary | ICD-10-CM

## 2025-03-25 PROCEDURE — 97140 MANUAL THERAPY 1/> REGIONS: CPT | Performed by: PHYSICAL THERAPIST

## 2025-03-25 PROCEDURE — 97110 THERAPEUTIC EXERCISES: CPT | Performed by: PHYSICAL THERAPIST

## 2025-03-25 PROCEDURE — 97112 NEUROMUSCULAR REEDUCATION: CPT | Performed by: PHYSICAL THERAPIST

## 2025-03-27 ENCOUNTER — TELEPHONE (OUTPATIENT)
Dept: PHYSICAL THERAPY | Facility: CLINIC | Age: 60
End: 2025-03-27

## 2025-03-27 NOTE — TELEPHONE ENCOUNTER
Caller: Carlota Brian    Relationship:  Self    PATIENT CALLED REQUESTING TO CANCEL SAME DAY APPT.    Did the patient call AFTER the start time of their scheduled appointment?  []YES  [x]NO    Was the patient's appointment rescheduled? []YES  [x]NO    Any additional information: RUNNING LATE, CANCELLED

## 2025-03-28 NOTE — PROGRESS NOTES
Physical Therapy Daily Treatment Note    [facility]   [address], [address] [place] [address]      Patient: Carlota Brian   : 1965  Referring practitioner: Dillan Marie MD  Date of Initial Visit: Type: THERAPY  Noted: 3/4/2025  Today's Date: 3/25/2025  Patient seen for 5 sessions    Certification Period 3/25/2025 thru 2025       Visit Diagnoses:    ICD-10-CM ICD-9-CM   1. Pain of left hip  M25.552 719.45       Subjective     Patient states that she feels like she has possibly noticed some slight improvement in her left hip pain over the past several weeks.  She continues to have moderate pain in the front of the hip when at full end range of motion internal rotation and when crossing the leg over to perform stretching, but she feels like she can move farther without pain.    Objective   See Exercise, Manual, and Modality Logs for complete treatment.       Assessment/Plan     Patient is making steady progress with therapy and she demonstrates an improvement in her tolerance activity and overall hip mobility.  Patient responded well to manual traction of the left hip into progressive resistive activity.  Will continue to progress as indicated.    Carlota Brian will continue to benefit from skilled physical therapy services to address deficits and continue to work towards reaching functional goals.           Timed:         Manual Therapy:    14     mins  28169;     Therapeutic Exercise:    10     mins  45717;     Neuromuscular Molina:    30    mins  50568;    Therapeutic Activity:          mins  14335;     Gait Training:           mins  83943;     Ultrasound:          mins  02225;    Ionto                                   mins   32390  Self Care                            mins   73702  Canalith Repos         mins 02116  Electrical Stimulation:         mins  60834    Un-Timed:  Electrical Stimulation:         mins  30946 ( );  Dry Needling          mins self-pay  Traction          mins  18333      Timed Treatment:   54   mins   Total Treatment:     54   mins    Misael Parkinson PT, DPT, Cert. DN  [place] License: [id number]

## 2025-04-01 ENCOUNTER — TREATMENT (OUTPATIENT)
Dept: PHYSICAL THERAPY | Facility: CLINIC | Age: 60
End: 2025-04-01
Payer: COMMERCIAL

## 2025-04-01 DIAGNOSIS — M25.552 PAIN OF LEFT HIP: Primary | ICD-10-CM

## 2025-04-01 PROCEDURE — 97112 NEUROMUSCULAR REEDUCATION: CPT | Performed by: PHYSICAL THERAPIST

## 2025-04-01 PROCEDURE — 97140 MANUAL THERAPY 1/> REGIONS: CPT | Performed by: PHYSICAL THERAPIST

## 2025-04-01 PROCEDURE — 97110 THERAPEUTIC EXERCISES: CPT | Performed by: PHYSICAL THERAPIST

## 2025-04-02 NOTE — PROGRESS NOTES
Physical Therapy Daily Treatment Note    [facility]   [address], [address] [place] [address]      Patient: Carlota Brian   : 1965  Referring practitioner: Dillan Marie MD  Date of Initial Visit: Type: THERAPY  Noted: 3/4/2025  Today's Date: 2025  Patient seen for 6 sessions    Certification Period 2025 thru 2025       Visit Diagnoses:    ICD-10-CM ICD-9-CM   1. Pain of left hip  M25.552 719.45       Subjective     Pt states that she feels like she is able to do more standing and walking activity and she feels improvement overall. She continues to have issues with pain when moving the hip in certain directions.     Objective   See Exercise, Manual, and Modality Logs for complete treatment.       Assessment/Plan     Pt has progressed well so far and she continues to have a positive response to manual intervention. Will cont to progress resisted activity as indicated.      Carlota Brian will continue to benefit from skilled physical therapy services to address deficits and continue to work towards reaching functional goals.           Timed:         Manual Therapy:    14     mins  02883;     Therapeutic Exercise:    30     mins  10593;     Neuromuscular Molina:    10    mins  42444;    Therapeutic Activity:          mins  59474;     Gait Training:           mins  91137;     Ultrasound:          mins  86817;    Ionto                                   mins   60879  Self Care                            mins   67255  Canalith Repos         mins 66970  Electrical Stimulation:         mins  50726    Un-Timed:  Electrical Stimulation:         mins  21109 ( );  Dry Needling          mins self-pay  Traction          mins 51590      Timed Treatment:   54   mins   Total Treatment:     54   mins    Misael Parkinson PT, DPT, Cert. DN  [place] License: [id number]

## 2025-04-03 ENCOUNTER — TREATMENT (OUTPATIENT)
Dept: PHYSICAL THERAPY | Facility: CLINIC | Age: 60
End: 2025-04-03
Payer: COMMERCIAL

## 2025-04-03 DIAGNOSIS — M25.552 PAIN OF LEFT HIP: Primary | ICD-10-CM

## 2025-04-08 ENCOUNTER — TREATMENT (OUTPATIENT)
Dept: PHYSICAL THERAPY | Facility: CLINIC | Age: 60
End: 2025-04-08
Payer: COMMERCIAL

## 2025-04-08 DIAGNOSIS — M25.552 PAIN OF LEFT HIP: Primary | ICD-10-CM

## 2025-04-09 NOTE — PROGRESS NOTES
Physical Therapy Daily Treatment Note    [facility]   [address], [address] [place] [address]      Patient: Carlota Brian   : 1965  Referring practitioner: Dillan Marie MD  Date of Initial Visit: Type: THERAPY  Noted: 3/4/2025  Today's Date: 2025  Patient seen for 8 sessions    Certification Period 2025 thru 2025       Visit Diagnoses:    ICD-10-CM ICD-9-CM   1. Pain of left hip  M25.552 719.45       Subjective     Patient states that she feels like she has had more pain over the past several days.  She is not sure if she has had any relief with the PRP injection and she continues to feel some limitation with prolonged standing and walking due to pain in the left hip.    Objective   See Exercise, Manual, and Modality Logs for complete treatment.       Assessment/Plan     Patient is making steady progress in regards to her strength and her ability to perform resisted activity in clinic.  She continues to demonstrate some limitation in her left hip mobility and she is unable to form all functional activities without having an increase in pain.  Will continue to address any Or limitation in the left hip and will progress strengthening as indicated.    Carlota Brian will continue to benefit from skilled physical therapy services to address deficits and continue to work towards reaching functional goals.           Timed:         Manual Therapy:    14     mins  87918;     Therapeutic Exercise:    10     mins  64524;     Neuromuscular Molina:    15    mins  89834;    Therapeutic Activity:     15     mins  36034;     Gait Training:           mins  05456;     Ultrasound:          mins  21762;    Ionto                                   mins   08710  Self Care                            mins   12349  Canalith Repos         mins 17871  Electrical Stimulation:         mins  18955    Un-Timed:  Electrical Stimulation:         mins  20820 ( );  Dry Needling          mins  self-pay  Traction          mins 45911      Timed Treatment:   54   mins   Total Treatment:     54   mins    Misael Parkinson PT, DPT, Cert. DN  [place] License: [id number]

## 2025-04-17 ENCOUNTER — TREATMENT (OUTPATIENT)
Dept: PHYSICAL THERAPY | Facility: CLINIC | Age: 60
End: 2025-04-17
Payer: COMMERCIAL

## 2025-04-17 DIAGNOSIS — M25.552 PAIN OF LEFT HIP: Primary | ICD-10-CM

## 2025-04-19 DIAGNOSIS — J06.9 UPPER RESPIRATORY TRACT INFECTION, UNSPECIFIED TYPE: ICD-10-CM

## 2025-04-19 NOTE — PROGRESS NOTES
Physical Therapy Daily Treatment Note    [facility]   [address] [place] [address]      Patient: Carlota Brian   : 1965  Referring practitioner: Dillan Marei MD  Date of Initial Visit: Type: THERAPY  Noted: 3/4/2025  Today's Date: 2025  Patient seen for 9 sessions    Certification Period 2025 thru 2025       Visit Diagnoses:    ICD-10-CM ICD-9-CM   1. Pain of left hip  M25.552 719.45       Subjective     Patient states that she is continuing to have intermittent and at times moderate pain in the left hip.  She continues to be limited with her ability to stand and walk for longer periods of time and she is unsure if she has had much relief with the PRP injection.    Objective   See Exercise, Manual, and Modality Logs for complete treatment.       Assessment/Plan     Continue with progression of activity in the clinic focused on left hip strength and stability.  Patient responds well to distraction of the left hip and we will continue with stretching and exercise to improve hip mobility and strength.    Carlota Brian will continue to benefit from skilled physical therapy services to address deficits and continue to work towards reaching functional goals.           Timed:         Manual Therapy:    15     mins  40048;     Therapeutic Exercise:    14     mins  85577;     Neuromuscular Molina:    28    mins  19185;    Therapeutic Activity:          mins  66273;     Gait Training:           mins  83240;     Ultrasound:          mins  08623;    Ionto                                   mins   35804  Self Care                            mins   83893  Canalith Repos         mins 27855  Electrical Stimulation:         mins  59155    Un-Timed:  Electrical Stimulation:         mins  29022 ( );  Dry Needling          mins self-pay  Traction          mins 24753      Timed Treatment:   57   mins   Total Treatment:     57   mins    Misael Parkinson, PT, DPT, Cert. DN  [place] License:  [id number]

## 2025-04-22 RX ORDER — ALBUTEROL SULFATE 90 UG/1
2 INHALANT RESPIRATORY (INHALATION) EVERY 4 HOURS PRN
Qty: 8.5 G | Refills: 0 | Status: SHIPPED | OUTPATIENT
Start: 2025-04-22

## 2025-04-25 ENCOUNTER — TREATMENT (OUTPATIENT)
Dept: PHYSICAL THERAPY | Facility: CLINIC | Age: 60
End: 2025-04-25
Payer: COMMERCIAL

## 2025-04-25 DIAGNOSIS — M25.552 PAIN OF LEFT HIP: Primary | ICD-10-CM

## 2025-04-29 ENCOUNTER — TREATMENT (OUTPATIENT)
Dept: PHYSICAL THERAPY | Facility: CLINIC | Age: 60
End: 2025-04-29
Payer: COMMERCIAL

## 2025-04-29 DIAGNOSIS — M25.552 PAIN OF LEFT HIP: Primary | ICD-10-CM

## 2025-04-29 PROCEDURE — 97112 NEUROMUSCULAR REEDUCATION: CPT | Performed by: PHYSICAL THERAPIST

## 2025-04-29 PROCEDURE — 97110 THERAPEUTIC EXERCISES: CPT | Performed by: PHYSICAL THERAPIST

## 2025-04-29 PROCEDURE — 97140 MANUAL THERAPY 1/> REGIONS: CPT | Performed by: PHYSICAL THERAPIST

## 2025-04-29 NOTE — PROGRESS NOTES
"- Yearly flu shot, pneumococcal vaccine   - Avoid raw fish, fish tanks, shellfish  - Immunizations for hepatitis A and B, no immunity as of 2024. Needs these vaccinations  - Avoid alcohol, tobacco use, NSAIDs  - Okay to take up to 2 g of Tylenol  - Screening colonoscopy 4/14/2023 normal with recall 10 years      Pt's brother states he will schedule Pt's MRI Abdomen w wo Contrast and MRCP, \"Central Scheduling\" phone number given to Pt's brother at OV    Scheduled date of EGD(as of today):8/29/25  Physician performing EGD:Dr. Laure Valentine  Location of EGD:[facility]  Instructions reviewed with patient by:Paula  Clearances: N/A  " Chief Complaint  Carlota Brian is a 56 y.o.  female presenting for Annual Exam (Please discuss results of DEXA done in 2022.)    History of Present Illness  Pleasant 57yo woman, here today for annual gyn exam.  She is menopausal and doing well without systemic HRT.  She does have vaginal dryness, and SA is somewhat uncomfortable, even with an OTC lubricant.  She is not yet having any vaginal infections or UTIs.  We discussed the DEXA report (osteopenia) and she was encouraged to be intentional re: calcium 1,200 mg/day and vitamin D 2,000 IU/day, with lots of walking and wt bearing exercise.      The following portions of the patient's history were reviewed and updated as appropriate: allergies, current medications, past family history, past medical history, past social history, past surgical history and problem list.    Allergies   Allergen Reactions   • Blue Dyes (Parenteral) Anaphylaxis   • Azithromycin Rash     Zpack breaks out in rash         Current Outpatient Medications:   •  albuterol sulfate  (90 Base) MCG/ACT inhaler, Inhale 2 puffs Every 4 (Four) Hours As Needed for Wheezing., Disp: 1 inhaler, Rfl: 1  •  alendronate (Fosamax) 70 MG tablet, Take 1 tablet by mouth Every 7 (Seven) Days., Disp: 12 tablet, Rfl: 0  •  Calcium Carbonate 1500 (600 Ca) MG tablet, Take 600 mg by mouth Daily., Disp: , Rfl:   •  Cholecalciferol (VITAMIN D3) 2000 units capsule, Take 2,000 Units by mouth Daily., Disp: , Rfl:   •  levocetirizine (XYZAL) 5 MG tablet, TAKE 1 TABLET BY MOUTH EVERY DAY, Disp: 90 tablet, Rfl: 3  •  levothyroxine (SYNTHROID, LEVOTHROID) 88 MCG tablet, TAKE 1 TABLET BY MOUTH EVERY DAY, Disp: 90 tablet, Rfl: 3  •  multivitamin with minerals (Multivitamin Women 50+) tablet tablet, Take 1 tablet by mouth Daily., Disp: , Rfl:     Past Medical History:   Diagnosis Date   • Family history of aortic aneurysm     Dad and P uncles and P aunt:  thoracic ascending   • Hypothyroidism    • Menopause   "  • Plantar fasciitis    • Seasonal allergies    • Type B blood, Rh positive 10/31/2019   • Vitamin D deficiency 5/10/2018        Past Surgical History:   Procedure Laterality Date   • COLONOSCOPY      age 48 in [place]   • EAR TUBES Bilateral        Objective  /80   Ht 175.3 cm (69\")   Wt 80.5 kg (177 lb 6.4 oz)   Breastfeeding No   BMI 26.20 kg/m²     Physical Exam  Exam conducted with a chaperone present.   Constitutional:       Appearance: Normal appearance.   HENT:      Head: Normocephalic.   Neck:      Thyroid: No thyroid mass or thyromegaly.   Cardiovascular:      Rate and Rhythm: Normal rate and regular rhythm.      Heart sounds: Normal heart sounds.   Pulmonary:      Effort: Pulmonary effort is normal.      Breath sounds: Normal breath sounds.   Chest:   Breasts:      Right: No inverted nipple, mass, nipple discharge, axillary adenopathy or supraclavicular adenopathy.      Left: No inverted nipple, mass, nipple discharge, axillary adenopathy or supraclavicular adenopathy.       Abdominal:      Palpations: Abdomen is soft. There is no mass.      Tenderness: There is no abdominal tenderness.   Genitourinary:     General: Normal vulva.      Labia:         Right: No lesion.         Left: No lesion.       Vagina: Erythema present.      Cervix: No discharge, lesion or erythema.      Uterus: Not enlarged and not tender.       Adnexa:         Right: No mass or tenderness.          Left: No mass or tenderness.        Comments: Marked atrophic pallor with stippling; some areas with more erythema, but no abn discharge.   Anus appears wnl.  No rectal exam performed.  Lymphadenopathy:      Upper Body:      Right upper body: No supraclavicular or axillary adenopathy.      Left upper body: No supraclavicular or axillary adenopathy.   Neurological:      Mental Status: She is alert.         Assessment/Plan   Diagnoses and all orders for this visit:    1. Encounter for gynecological examination with abnormal " finding (Primary)    2. Atrophy of vagina    Compounded Estradiol cream (0.1) to use 0.5 g intravaginally at HS 2x/ week.      Procedures    40 to 64: Counseling/Anticipatory Guidance Discussed: nutrition, physical activity, screenings and self-breast exam        Return in about 3 months (around 6/24/2022) for Recheck 3 mo FU atrophy (and new start est vag cream).    Radha Murguia, KACIE  03/24/2022

## 2025-04-30 NOTE — PROGRESS NOTES
Physical Therapy Daily Treatment Note    [facility]   [address], [address] [place] [address]      Patient: Carlota Brian   : 1965  Referring practitioner: Dillan Marie MD  Date of Initial Visit: Type: THERAPY  Noted: 3/4/2025  Today's Date: 2025  Patient seen for 11 sessions    Certification Period 2025 thru 2025       Visit Diagnoses:    ICD-10-CM ICD-9-CM   1. Pain of left hip  M25.552 719.45       Subjective     Patient states that she feels like she is doing well overall but she continues to have the same pattern of pain with certain movements.  She is numbness fatigued today because she has the day off.    Objective   See Exercise, Manual, and Modality Logs for complete treatment.       Assessment/Plan     Patient continues respond well to manual long axis distraction to the right hip and she has a temporary improvement in her pain-free active range of motion after manual intervention.  We have not seen much carryover of this improvement in range of motion but we will continue to progress overall strengthening as indicated in anticipation of possible total hip arthroplasty in the near future.    Carlota Brian will continue to benefit from skilled physical therapy services to address deficits and continue to work towards reaching functional goals.           Timed:         Manual Therapy:    16     mins  61552;     Therapeutic Exercise:    15     mins  73696;     Neuromuscular Molina:    25    mins  97390;    Therapeutic Activity:          mins  55503;     Gait Training:           mins  08824;     Ultrasound:          mins  11463;    Ionto                                   mins   64434  Self Care                            mins   85468  Canalith Repos         mins 73080  Electrical Stimulation:         mins  36149    Un-Timed:  Electrical Stimulation:         mins  79139 ( );  Dry Needling          mins self-pay  Traction          mins 45356      Timed Treatment:   56    mins   Total Treatment:     56   mins    Misael Parkinson, PT, DPT, Cert. DN  [place] License: [id number]

## 2025-04-30 NOTE — PROGRESS NOTES
Physical Therapy Daily Treatment Note    [facility]   [address], [address] [place] [address]      Patient: Carlota Brian   : 1965  Referring practitioner: Dillan Marie MD  Date of Initial Visit: Type: THERAPY  Noted: 3/4/2025  Today's Date: 2025  Patient seen for 10 sessions    Certification Period 2025 thru 2025       Visit Diagnoses:    ICD-10-CM ICD-9-CM   1. Pain of left hip  M25.552 719.45       Subjective     Patient states that she is feeling about the same.  She continues to have symptoms into the left hip with certain movements and she feels limited with her ability to perform all functional activities because of discomfort in the front of the left hip.    Objective   See Exercise, Manual, and Modality Logs for complete treatment.       Assessment/Plan     Continue to progress strengthening as tolerated.  Patient has been able to increase her overall strength but she continues to be limited when moving the hip into internal rotation or extension.  Will continue to progress as indicated.    Carlota Brian will continue to benefit from skilled physical therapy services to address deficits and continue to work towards reaching functional goals.           Timed:         Manual Therapy:    12     mins  18336;     Therapeutic Exercise:    14     mins  06954;     Neuromuscular Molina:    15    mins  40258;    Therapeutic Activity:     15     mins  27248;     Gait Training:           mins  76643;     Ultrasound:          mins  29539;    Ionto                                   mins   46836  Self Care                            mins   87846  Canalith Repos         mins 39160  Electrical Stimulation:         mins  76380    Un-Timed:  Electrical Stimulation:         mins  58459 ( );  Dry Needling          mins self-pay  Traction          mins 43524      Timed Treatment:   56   mins   Total Treatment:     56   mins    Misael Parkinson, PT, DPT, Cert. DN  [place] License:  [id number]

## 2025-05-01 ENCOUNTER — TREATMENT (OUTPATIENT)
Dept: PHYSICAL THERAPY | Facility: CLINIC | Age: 60
End: 2025-05-01
Payer: COMMERCIAL

## 2025-05-01 DIAGNOSIS — M25.552 PAIN OF LEFT HIP: Primary | ICD-10-CM

## 2025-05-01 PROCEDURE — 97112 NEUROMUSCULAR REEDUCATION: CPT | Performed by: PHYSICAL THERAPIST

## 2025-05-01 PROCEDURE — 97140 MANUAL THERAPY 1/> REGIONS: CPT | Performed by: PHYSICAL THERAPIST

## 2025-05-01 PROCEDURE — 97110 THERAPEUTIC EXERCISES: CPT | Performed by: PHYSICAL THERAPIST

## 2025-05-01 NOTE — PROGRESS NOTES
Physical Therapy Daily Treatment Note    [facility]   [address], [address] [place] [address]      Patient: Carlota Brian   : 1965  Referring practitioner: Dillan Marie MD  Date of Initial Visit: Type: THERAPY  Noted: 3/4/2025  Today's Date: 2025  Patient seen for 12 sessions    Certification Period 2025 thru 2025       Visit Diagnoses:    ICD-10-CM ICD-9-CM   1. Pain of left hip  M25.552 719.45       Subjective     Patient reports that her symptoms are largely unchanged since her previous visit.  She continues to perform stretching and light exercise at home and feels that she has been able to maintain some strength and flexibility in the left hip.    Objective   See Exercise, Manual, and Modality Logs for complete treatment.       Assessment/Plan     Reviewed a comprehensive home exercise program with the patient today and updated her routine.  Patient continues to be limited due to pain in the left hip and she has limitation in her mobility as well.  Patient continues to respond very well to manual intervention.    Carlota Brian will continue to benefit from skilled physical therapy services to address deficits and continue to work towards reaching functional goals.       Access Code: [email or web address]  URL: [email or web address]  Date: 2025  Prepared by: Misael Parkinson    Exercises  - Supine Hamstring Stretch with Strap  - 1 x daily - 7 x weekly - 1 sets - 3 reps - 30 sec hold  - Supine Quadratus Lumborum Stretch  - 1 x daily - 7 x weekly - 1 sets - 3 reps - 30 hold  - Supine Hip Adduction Isometric with Ball  - 1 x daily - 7 x weekly - 3 sets - 10 reps - 5 sec hold  - Supine Bridge with Mini Swiss Ball Between Knees  - 1 x daily - 7 x weekly - 3 sets - 10 reps - 3 sec hold  - Hooklying Clamshell with Resistance  - 1 x daily - 7 x weekly - 3 sets - 10 reps  - Hooklying Isometric Clamshell  - 1 x daily - 7 x weekly - 3 sets - 10 reps  - Supine Bridge with Resistance Band   - 1 x daily - 7 x weekly - 3 sets - 10 reps - 3 sec hold  - Supine March with Resistance Band  - 1 x daily - 7 x weekly - 3 sets - 10 reps - 2 sec hold  - Hip Abduction with Resistance Loop  - 1 x daily - 7 x weekly - 3 sets - 10 reps - 2 sec hold  - Hip Extension with Resistance Loop  - 1 x daily - 7 x weekly - 3 sets - 10 reps - 2 sec hold  - Seated Long Arc Quad  - 1 x daily - 7 x weekly - 3 sets - 10 reps      Timed:         Manual Therapy:    16     mins  65954;     Therapeutic Exercise:    30     mins  83053;     Neuromuscular Molina:    10    mins  47029;    Therapeutic Activity:          mins  01895;     Gait Training:           mins  80967;     Ultrasound:          mins  60251;    Ionto                                   mins   03061  Self Care                            mins   94430  Canalith Repos         mins 66227  Electrical Stimulation:         mins  82007    Un-Timed:  Electrical Stimulation:         mins  65643 ( );  Dry Needling          mins self-pay  Traction          mins 81499      Timed Treatment:   56   mins   Total Treatment:     56   mins    Misael Parkinson PT, NESSAT, Cert. DN  [place] License: [id number]

## 2025-05-05 ENCOUNTER — OFFICE VISIT (OUTPATIENT)
Dept: INTERNAL MEDICINE | Facility: CLINIC | Age: 60
End: 2025-05-05
Payer: COMMERCIAL

## 2025-05-05 VITALS
OXYGEN SATURATION: 98 % | SYSTOLIC BLOOD PRESSURE: 112 MMHG | BODY MASS INDEX: 23.64 KG/M2 | HEART RATE: 70 BPM | DIASTOLIC BLOOD PRESSURE: 82 MMHG | WEIGHT: 156 LBS | TEMPERATURE: 97.7 F | HEIGHT: 68 IN

## 2025-05-05 DIAGNOSIS — G89.29 CHRONIC LEFT HIP PAIN: Primary | ICD-10-CM

## 2025-05-05 DIAGNOSIS — M25.552 CHRONIC LEFT HIP PAIN: Primary | ICD-10-CM

## 2025-05-05 DIAGNOSIS — M16.12 PRIMARY OSTEOARTHRITIS OF LEFT HIP: ICD-10-CM

## 2025-05-05 RX ORDER — DIPHENHYDRAMINE HYDROCHLORIDE 25 MG/1
5 TABLET ORAL DAILY
COMMUNITY

## 2025-05-05 NOTE — PROGRESS NOTES
"Subjective   Carlota Brian is a 59 y.o. female.         Chief Complaint   Patient presents with    Hip Pain     LEFT HIP PAIN. Has been doing PT (several years) and taking ibuprofen.  Discuss proceeding with surgery for hip replacement       Visit Vitals  /82 (BP Location: Left arm, Patient Position: Sitting, Cuff Size: Adult)   Pulse 70   Temp 97.7 °F (36.5 °C)   Ht 173.5 cm (68.3\")   Wt 70.8 kg (156 lb)   SpO2 98%   BMI 23.51 kg/m²       Wt Readings from Last 3 Encounters:   05/05/25 70.8 kg (156 lb)   12/04/24 69.9 kg (154 lb)   11/07/24 68.9 kg (152 lb)         History of Present Illness   Pt wants a left hip replacement. Pt had a left hip PT for years. Pt had plasma injection 3 months ago without improvement. Pt taking daily ibuprofen because of the pain.     Pt's mother had problems with hip replacement from infection.   The following portions of the patient's history were reviewed and updated as appropriate: allergies, current medications, past family history, past medical history, past social history, past surgical history, and problem list.    Past Medical History:   Diagnosis Date    Allergic Always    Asthma Childhood    Basal cell carcinoma (BCC) of forehead 2008    left removed by Mohs    Family history of aortic aneurysm     Dad and P uncles and P aunt:  thoracic ascending    Hypothyroidism     Menopause     Plantar fasciitis     Seasonal allergies     Type B blood, Rh positive 10/31/2019    Urinary tract infection Once    Vitamin D deficiency 05/10/2018      Past Surgical History:   Procedure Laterality Date    COLONOSCOPY      age 48 in [place]    COLONOSCOPY W/ POLYPECTOMY  05/23/2024    Dr Roy, hyperplastic polyp x2    EAR TUBES Bilateral     MOHS SURGERY Left 2008    ft forehead BCCA      Family History   Problem Relation Age of Onset    Stroke Mother         brain bleed    Thyroid disease Mother     Coronary artery disease Mother     Hypertension Mother     Hyperlipidemia Mother     " "Scleroderma Mother     Dumont's esophagus Mother     Peripheral vascular disease Mother     Hepatitis Mother         ? hep C from transfusion    Fibromyalgia Mother     Aneurysm Father     Thyroid disease Sister     Seizures Sister     Autism Son     Heart attack Paternal Aunt     Aneurysm Paternal Aunt     Dementia Paternal Aunt     Lung cancer Paternal Aunt     Heart attack Paternal Uncle     Aneurysm Paternal Uncle     Heart attack Paternal Uncle     No Known Problems Paternal Grandmother     Heart attack Paternal Grandfather       Social History     Socioeconomic History    Marital status:    Tobacco Use    Smoking status: Never    Smokeless tobacco: Never   Vaping Use    Vaping status: Never Used   Substance and Sexual Activity    Alcohol use: Yes     Alcohol/week: 1.0 standard drink of alcohol     Types: 1 Drinks containing 0.5 oz of alcohol per week     Comment: occ    Drug use: No    Sexual activity: Yes     Partners: Male     Birth control/protection: Post-menopausal     Comment:       Allergies   Allergen Reactions    Blue Dyes (Parenteral) Anaphylaxis    Penicillins Other (See Comments)     Patient states that \"cillins\" do not work for her.  Pt is able to take cephalosporins    Azithromycin Rash     Zpack breaks out in rash       Review of Systems   Musculoskeletal:  Positive for arthralgias (left hip).       Objective   Physical Exam  Vitals and nursing note reviewed.   Constitutional:       Appearance: She is well-developed.   HENT:      Head: Normocephalic.      Right Ear: External ear normal.      Left Ear: External ear normal.      Nose: Nose normal.   Eyes:      General: Lids are normal.      Conjunctiva/sclera: Conjunctivae normal.      Pupils: Pupils are equal, round, and reactive to light.   Neck:      Thyroid: No thyroid mass or thyromegaly.      Vascular: No carotid bruit.      Trachea: Trachea normal.   Cardiovascular:      Rate and Rhythm: Normal rate and regular rhythm.      " Heart sounds: No murmur heard.  Pulmonary:      Effort: Pulmonary effort is normal. No respiratory distress.      Breath sounds: Normal breath sounds. No decreased breath sounds, wheezing, rhonchi or rales.   Chest:      Chest wall: No tenderness.   Abdominal:      General: Bowel sounds are normal.      Palpations: Abdomen is soft.      Tenderness: There is no abdominal tenderness.   Musculoskeletal:         General: Normal range of motion.      Cervical back: Normal range of motion and neck supple.   Skin:     General: Skin is warm and dry.   Neurological:      Mental Status: She is alert and oriented to person, place, and time.   Psychiatric:         Behavior: Behavior normal.         Assessment & Plan   Problems Addressed this Visit    None  Visit Diagnoses         Chronic left hip pain    -  Primary    Relevant Orders    Ambulatory Referral to Orthopedic Surgery (Completed)      Primary osteoarthritis of left hip        Relevant Orders    Ambulatory Referral to Orthopedic Surgery (Completed)          Diagnoses         Codes Comments      Chronic left hip pain    -  Primary ICD-10-CM: M25.552, G89.29  ICD-9-CM: 719.45, 338.29       Primary osteoarthritis of left hip     ICD-10-CM: M16.12  ICD-9-CM: 715.15             Discussed getting pneumonia vaccine and shingrix vaccine           Current Outpatient Medications:     albuterol sulfate  (90 Base) MCG/ACT inhaler, INHALE 2 PUFFS EVERY 4 HOURS AS NEEDED FOR WHEEZING, Disp: 8.5 g, Rfl: 0    alendronate (Fosamax) 70 MG tablet, Take 1 tablet by mouth Every 7 (Seven) Days., Disp: 12 tablet, Rfl: 3    Calcium Carbonate 1500 (600 Ca) MG tablet, Take 1 tablet by mouth Daily., Disp: , Rfl:     Cholecalciferol (VITAMIN D3) 2000 units capsule, Take 1 capsule by mouth Daily., Disp: , Rfl:     ELDERBERRY PO, Take  by mouth., Disp: , Rfl:     estradiol (ESTRACE VAGINAL) 0.1 MG/GM vaginal cream, Insert 0.5g vaginally 2x weekly at night, Disp: 42.5 g, Rfl: 1     levocetirizine (XYZAL) 5 MG tablet, TAKE 1 TABLET BY MOUTH DAILY, Disp: 90 tablet, Rfl: 3    levothyroxine (SYNTHROID, LEVOTHROID) 88 MCG tablet, Take 1 tablet by mouth Daily., Disp: 180 tablet, Rfl: 1    multivitamin with minerals tablet tablet, Take 1 tablet by mouth Daily., Disp: , Rfl:     Biotin 5 MG capsule, Take 1 capsule by mouth Daily., Disp: , Rfl:     Return in about 3 days (around 5/8/2025), or if symptoms worsen or fail to improve, for Next scheduled follow up, Recheck.    Narrative & Impression   MRI HIP LEFT WO CONTRAST     Date of Exam: 7/31/2024 8:06 AM EDT     Indication: Chronic left hip pain not improving with physical therapy..     Comparison: 2/12/2024 radiographs     Technique:  Routine multiplanar/multisequence images of the left hip were obtained without contrast administration.          Findings:  There is no evidence of acute fracture. There is normal pelvic bone alignment. Moderate bilateral SI joint and pubic symphysis degenerative change. No suspicious osseous lesion.     There is moderate to severe left hip joint arthritis. Small marginal osteophyte formations are present. There is full-thickness anterior superior weightbearing articular cartilage loss. Multifocal acetabular labral tearing, predominantly along the   superior acetabular labrum. There is subchondral edema within the superior acetabulum. Moderate hip joint effusion with evidence of synovitis.     There is mild distal gluteal tendinopathy bilaterally. No evidence of discrete tendon tear or significant adjacent bursitis. No significant fatty muscle atrophy.     The bilateral iliopsoas, hamstrings, and adductor myotendinous structures are within normal limits.     The included intrapelvic soft tissues show no acute abnormality.     IMPRESSION:  Impression:  Moderate to severe left hip joint arthritis including full-thickness anterior superior weightbearing articular cartilage loss and multifocal acetabular tearing. Reactive  subchondral edema within the superior acetabulum is present. Moderate hip joint   effusion with evidence of synovitis.     Mild bilateral distal gluteal tendinopathy.     No evidence of fracture or avascular necrosis.           Electronically Signed: Mau Kumari MD    8/1/2024 1:22 PM EDT    Workstation ID: [id number]

## 2025-05-06 ENCOUNTER — TREATMENT (OUTPATIENT)
Dept: PHYSICAL THERAPY | Facility: CLINIC | Age: 60
End: 2025-05-06
Payer: COMMERCIAL

## 2025-05-06 DIAGNOSIS — M25.552 PAIN OF LEFT HIP: Primary | ICD-10-CM

## 2025-05-07 ENCOUNTER — TELEPHONE (OUTPATIENT)
Dept: OBSTETRICS AND GYNECOLOGY | Facility: CLINIC | Age: 60
End: 2025-05-07
Payer: COMMERCIAL

## 2025-05-07 DIAGNOSIS — M85.89 OSTEOPENIA OF MULTIPLE SITES: ICD-10-CM

## 2025-05-07 RX ORDER — ALENDRONATE SODIUM 70 MG/1
70 TABLET ORAL
Qty: 12 TABLET | Refills: 3 | Status: SHIPPED | OUTPATIENT
Start: 2025-05-07

## 2025-05-08 NOTE — PROGRESS NOTES
Physical Therapy Daily Treatment Note    [facility]   [address], [address] [place] [address]      Patient: Carlota Brian   : 1965  Referring practitioner: Dillan Marie MD  Date of Initial Visit: Type: THERAPY  Noted: 3/4/2025  Today's Date: 2025  Patient seen for 13 sessions    Certification Period 2025 thru 2025       Visit Diagnoses:    ICD-10-CM ICD-9-CM   1. Pain of left hip  M25.552 719.45       Subjective     Patient states that she has continued to have similar symptoms over the past several weeks.  She feels that her strength has improved with exercise in the clinic but she continues to have sharp pain in the front of the hip and limitation with certain range of motion.  Patient reports compliance with home exercise program.    Objective   See Exercise, Manual, and Modality Logs for complete treatment.       Assessment/Plan     Continued with long axis distraction of the left hip as this tends to decrease the patient's pain and improves her ability to perform exercise in the clinic.  Continuing to progress resisted activity in the clinic but making sure that we are doing so without increasing pain.  Patient will be seeing Dr. Bonilla for consult regarding left hip arthroplasty.    Carlota Brian will continue to benefit from skilled physical therapy services to address deficits and continue to work towards reaching functional goals.           Timed:         Manual Therapy:    16     mins  21862;     Therapeutic Exercise:    10     mins  05990;     Neuromuscular Molina:    30    mins  93759;    Therapeutic Activity:          mins  50831;     Gait Training:           mins  42866;     Ultrasound:          mins  61272;    Ionto                                   mins   69679  Self Care                            mins   88107  Canalith Repos         mins 26311  Electrical Stimulation:         mins  29397    Un-Timed:  Electrical Stimulation:         mins  72963 (  );  Dry Needling          mins self-pay  Traction          mins 23509      Timed Treatment:   56   mins   Total Treatment:     56   mins    Misael Parkinson PT, DPT, Cert. DN  [place] License: [id number]

## 2025-05-15 ENCOUNTER — TREATMENT (OUTPATIENT)
Dept: PHYSICAL THERAPY | Facility: CLINIC | Age: 60
End: 2025-05-15
Payer: COMMERCIAL

## 2025-05-15 DIAGNOSIS — M25.552 PAIN OF LEFT HIP: Primary | ICD-10-CM

## 2025-05-17 NOTE — PROGRESS NOTES
Physical Therapy Daily Treatment Note    [facility]   [address], [address] [place] [address]      Patient: Carlota Brian   : 1965  Referring practitioner: Dillan Marie MD  Date of Initial Visit: Type: THERAPY  Noted: 3/4/2025  Today's Date: 5/15/2025  Patient seen for 14 sessions    Certification Period 5/15/2025 thru 2025       Visit Diagnoses:    ICD-10-CM ICD-9-CM   1. Pain of left hip  M25.552 719.45       Subjective     Patient states that she has scheduled a left hip RUTH for July.  She reports no changes in her symptoms since the previous visit but she does typically feel better after therapy sessions for a brief period of time.    Objective   See Exercise, Manual, and Modality Logs for complete treatment.       Assessment/Plan     Reviewed a home exercise program with the patient and instructed on proper progression of activity leading up to hip replacement surgery and July.  Will continue to use manual traction and stretching in the clinic to improve overall hip mobility and will progress strengthening as appropriate.    Carlota Brian will continue to benefit from skilled physical therapy services to address deficits and continue to work towards reaching functional goals.           Timed:         Manual Therapy:    14     mins  02416;     Therapeutic Exercise:    15     mins  38731;     Neuromuscular Molina:    15    mins  63494;    Therapeutic Activity:          mins  78065;     Gait Training:           mins  48538;     Ultrasound:          mins  51864;    Ionto                                   mins   35078  Self Care                            mins   90677  Canalith Repos         mins 23333  Electrical Stimulation:         mins  18361    Un-Timed:  Electrical Stimulation:         mins  04994 (MC );  Dry Needling          mins self-pay  Traction          mins 77430      Timed Treatment:   44   mins   Total Treatment:     44   mins    Misael Parkinson, PT, DPT, Cert. DN  [place]  License: [id number]

## 2025-05-22 ENCOUNTER — TREATMENT (OUTPATIENT)
Dept: PHYSICAL THERAPY | Facility: CLINIC | Age: 60
End: 2025-05-22
Payer: COMMERCIAL

## 2025-05-22 DIAGNOSIS — M25.552 PAIN OF LEFT HIP: Primary | ICD-10-CM

## 2025-05-26 DIAGNOSIS — J30.2 SEASONAL ALLERGIES: ICD-10-CM

## 2025-05-27 RX ORDER — LEVOCETIRIZINE DIHYDROCHLORIDE 5 MG/1
5 TABLET, FILM COATED ORAL DAILY
Qty: 90 TABLET | Refills: 1 | Status: SHIPPED | OUTPATIENT
Start: 2025-05-27

## 2025-05-27 NOTE — PROGRESS NOTES
Physical Therapy Daily Treatment Note    [facility]   [address] [place] [address]      Patient: Carlota Brian   : 1965  Referring practitioner: Dillan Marie MD  Date of Initial Visit: Type: THERAPY  Noted: 3/4/2025  Today's Date: 2025  Patient seen for 15 sessions    Certification Period 2025 thru 2025       Visit Diagnoses:    ICD-10-CM ICD-9-CM   1. Pain of left hip  M25.552 719.45       Subjective     Pt states that she feels like she is doing about the same. She had a lidocaine injection which helped to decrease her pain slightly, but she had some temporary increase in soreness in the left hip afterwards.     Objective   See Exercise, Manual, and Modality Logs for complete treatment.       Assessment/Plan     Pt continues to respond well to manual intervention and reports having a decrease in her overall pain level. She has been able to maintain strength with exercise in the clinic and at home, but is limited with progression due to pain in the hip.      Carlota Brian will continue to benefit from skilled physical therapy services to address deficits and continue to work towards reaching functional goals.           Timed:         Manual Therapy:    12     mins  11388;     Therapeutic Exercise:    14     mins  51147;     Neuromuscular Molina:    15    mins  78415;    Therapeutic Activity:     15     mins  74834;     Gait Training:           mins  79904;     Ultrasound:          mins  17446;    Ionto                                   mins   69795  Self Care                            mins   53927  Canalith Repos         mins 51366  Electrical Stimulation:         mins  56945    Un-Timed:  Electrical Stimulation:         mins  17389 (MC );  Dry Needling          mins self-pay  Traction          mins 87255      Timed Treatment:   56   mins   Total Treatment:     56   mins    Misael Parkinson PT, DPT, Cert. DN  [place] License: [id number]

## 2025-05-27 NOTE — TELEPHONE ENCOUNTER
Rx Refill Note  Requested Prescriptions     Pending Prescriptions Disp Refills    levocetirizine (XYZAL) 5 MG tablet [Pharmacy Med Name: LEVOCETIRIZINE 5MG TABLETS] 90 tablet 1     Sig: TAKE 1 TABLET BY MOUTH DAILY      Last office visit with prescribing clinician: 5/5/2025     Next office visit with prescribing clinician: Visit date not found       Libby Alonso  05/27/25, 17:11 EDT

## 2025-05-29 ENCOUNTER — TREATMENT (OUTPATIENT)
Dept: PHYSICAL THERAPY | Facility: CLINIC | Age: 60
End: 2025-05-29
Payer: COMMERCIAL

## 2025-05-29 DIAGNOSIS — M25.552 PAIN OF LEFT HIP: Primary | ICD-10-CM

## 2025-05-29 NOTE — PROGRESS NOTES
Physical Therapy Daily Treatment Note    [facility]   [address], [address] [place] [address]      Patient: Carlota Brian   : 1965  Referring practitioner: Dillan Marie MD  Date of Initial Visit: Type: THERAPY  Noted: 3/4/2025  Today's Date: 2025  Patient seen for 16 sessions    Certification Period 2025 thru 2025       Visit Diagnoses:    ICD-10-CM ICD-9-CM   1. Pain of left hip  M25.552 719.45       Subjective     Patient states that she has been working on continuation of her exercise program at home and she is prepping for her upcoming surgery in July.    Objective   See Exercise, Manual, and Modality Logs for complete treatment.       Assessment/Plan     Orders the patient today on progression of her home exercise program and review all exercises to be performed at home.  Talked patient about proper set up for activity at home and presurgical considerations in regards to her home routine.  Patient was instructed to contact me if she has any questions prior to the surgery and she will schedule for evaluation after the left RUTH.    Carlota Brian will continue to benefit from skilled physical therapy services to address deficits and continue to work towards reaching functional goals.       Access Code: [email or web address]  URL: [email or web address]  Date: 2025  Prepared by: Misael Parkinson    Exercises  - Supine Hamstring Stretch with Strap  - 1 x daily - 7 x weekly - 1 sets - 3 reps - 30 sec hold  - Supine Quadratus Lumborum Stretch  - 1 x daily - 7 x weekly - 1 sets - 3 reps - 30 hold  - Supine Hip Adduction Isometric with Ball  - 1 x daily - 7 x weekly - 3 sets - 10 reps - 5 sec hold  - Supine Bridge with Mini Swiss Ball Between Knees  - 1 x daily - 7 x weekly - 3 sets - 10 reps - 3 sec hold  - Hooklying Clamshell with Resistance  - 1 x daily - 7 x weekly - 3 sets - 10 reps  - Hooklying Isometric Clamshell  - 1 x daily - 7 x weekly - 3 sets - 10 reps  - Supine Bridge  with Resistance Band  - 1 x daily - 7 x weekly - 3 sets - 10 reps - 3 sec hold  - Supine March with Resistance Band  - 1 x daily - 7 x weekly - 3 sets - 10 reps - 2 sec hold  - Hip Abduction with Resistance Loop  - 1 x daily - 7 x weekly - 3 sets - 10 reps - 2 sec hold  - Hip Extension with Resistance Loop  - 1 x daily - 7 x weekly - 3 sets - 10 reps - 2 sec hold  - Seated Long Arc Quad  - 1 x daily - 7 x weekly - 3 sets - 10 reps  - Heel Raises with Counter Support  - 1 x daily - 7 x weekly - 3 sets - 10 reps  - Heel Toe Raises with Counter Support  - 1 x daily - 7 x weekly - 3 sets - 10 reps  - Clamshell with Resistance  - 1 x daily - 7 x weekly - 3 sets - 10 reps - 2 sec hold  - Standing Single Leg Stance with Counter Support  - 1 x daily - 7 x weekly - 1 sets - 3 reps - 30- sec hold  - Standing Hamstring Curl with Resistance  - 1 x daily - 7 x weekly - 3 sets - 10 reps  - Supine Hip Internal and External Rotation  - 1 x daily - 7 x weekly - 2 sets - 10 reps - 5 sec hold  - Side Stepping with Resistance at Ankles  - 1 x daily - 7 x weekly - 1 sets - 5 reps  - Forward Monster Walks  - 1 x daily - 7 x weekly - 1 sets - 5 reps    Timed:         Manual Therapy:         mins  19192;     Therapeutic Exercise:    25     mins  77244;     Neuromuscular Molina:        mins  67565;    Therapeutic Activity:      13    mins  25311;     Gait Training:           mins  36583;     Ultrasound:          mins  63274;    Ionto                                   mins   47602  Self Care                            mins   93780  Canalith Repos         mins 92540  Electrical Stimulation:         mins  34391    Un-Timed:  Electrical Stimulation:         mins  35975 (MC );  Dry Needling          mins self-pay  Traction          mins 22431      Timed Treatment:   38   mins   Total Treatment:     38   mins    Misael Parkinson PT, DPT, Cert. DN  [place] License: [id number]

## 2025-08-18 ENCOUNTER — TREATMENT (OUTPATIENT)
Dept: PHYSICAL THERAPY | Facility: CLINIC | Age: 60
End: 2025-08-18
Payer: COMMERCIAL

## 2025-08-18 DIAGNOSIS — M25.552 PAIN OF LEFT HIP: Primary | ICD-10-CM

## 2025-08-18 PROCEDURE — 97161 PT EVAL LOW COMPLEX 20 MIN: CPT | Performed by: PHYSICAL THERAPIST

## 2025-08-18 PROCEDURE — 97110 THERAPEUTIC EXERCISES: CPT | Performed by: PHYSICAL THERAPIST

## 2025-08-21 ENCOUNTER — TREATMENT (OUTPATIENT)
Dept: PHYSICAL THERAPY | Facility: CLINIC | Age: 60
End: 2025-08-21
Payer: COMMERCIAL

## 2025-08-21 DIAGNOSIS — M25.552 PAIN OF LEFT HIP: Primary | ICD-10-CM

## 2025-08-21 PROCEDURE — 97112 NEUROMUSCULAR REEDUCATION: CPT | Performed by: PHYSICAL THERAPIST

## 2025-08-21 PROCEDURE — 97110 THERAPEUTIC EXERCISES: CPT | Performed by: PHYSICAL THERAPIST

## 2025-08-26 ENCOUNTER — TREATMENT (OUTPATIENT)
Dept: PHYSICAL THERAPY | Facility: CLINIC | Age: 60
End: 2025-08-26
Payer: COMMERCIAL

## 2025-08-26 DIAGNOSIS — M25.552 PAIN OF LEFT HIP: Primary | ICD-10-CM

## 2025-08-26 PROCEDURE — 97112 NEUROMUSCULAR REEDUCATION: CPT | Performed by: PHYSICAL THERAPIST

## 2025-08-26 PROCEDURE — 97530 THERAPEUTIC ACTIVITIES: CPT | Performed by: PHYSICAL THERAPIST

## 2025-08-26 PROCEDURE — 97110 THERAPEUTIC EXERCISES: CPT | Performed by: PHYSICAL THERAPIST

## 2025-08-29 ENCOUNTER — TREATMENT (OUTPATIENT)
Dept: PHYSICAL THERAPY | Facility: CLINIC | Age: 60
End: 2025-08-29
Payer: COMMERCIAL

## 2025-08-29 DIAGNOSIS — M25.552 PAIN OF LEFT HIP: Primary | ICD-10-CM

## 2025-08-29 PROCEDURE — 97530 THERAPEUTIC ACTIVITIES: CPT | Performed by: PHYSICAL THERAPIST

## 2025-08-29 PROCEDURE — 97110 THERAPEUTIC EXERCISES: CPT | Performed by: PHYSICAL THERAPIST

## 2025-08-29 PROCEDURE — 97112 NEUROMUSCULAR REEDUCATION: CPT | Performed by: PHYSICAL THERAPIST
